# Patient Record
Sex: FEMALE | Race: WHITE | NOT HISPANIC OR LATINO | Employment: FULL TIME | URBAN - METROPOLITAN AREA
[De-identification: names, ages, dates, MRNs, and addresses within clinical notes are randomized per-mention and may not be internally consistent; named-entity substitution may affect disease eponyms.]

---

## 2017-07-14 ENCOUNTER — TRANSCRIBE ORDERS (OUTPATIENT)
Dept: ADMINISTRATIVE | Facility: HOSPITAL | Age: 57
End: 2017-07-14

## 2017-07-14 DIAGNOSIS — R94.39 EQUIVOCAL STRESS TEST: Primary | ICD-10-CM

## 2017-07-21 ENCOUNTER — HOSPITAL ENCOUNTER (OUTPATIENT)
Facility: HOSPITAL | Age: 57
Setting detail: HOSPITAL OUTPATIENT SURGERY
Discharge: HOME | End: 2017-07-21
Attending: INTERNAL MEDICINE | Admitting: INTERNAL MEDICINE

## 2017-07-21 VITALS
WEIGHT: 211.64 LBS | RESPIRATION RATE: 16 BRPM | DIASTOLIC BLOOD PRESSURE: 75 MMHG | HEIGHT: 65 IN | SYSTOLIC BLOOD PRESSURE: 101 MMHG | OXYGEN SATURATION: 93 % | HEART RATE: 66 BPM | TEMPERATURE: 97.3 F | BODY MASS INDEX: 35.26 KG/M2

## 2017-07-21 DIAGNOSIS — R94.39 EQUIVOCAL STRESS TEST: ICD-10-CM

## 2017-07-21 LAB
ANION GAP SERPL CALCULATED.3IONS-SCNC: 12 MMOL/L (ref 3–11)
BUN BLD-MCNC: 17 MG/DL (ref 9–23)
BUN/CREAT SERPL: 18.9 (ref 7–25)
CALCIUM SPEC-SCNC: 9.6 MG/DL (ref 8.7–10.4)
CHLORIDE SERPL-SCNC: 103 MMOL/L (ref 99–109)
CO2 SERPL-SCNC: 23 MMOL/L (ref 20–31)
CREAT BLD-MCNC: 0.9 MG/DL (ref 0.6–1.3)
DEPRECATED RDW RBC AUTO: 43 FL (ref 37–54)
ERYTHROCYTE [DISTWIDTH] IN BLOOD BY AUTOMATED COUNT: 12.9 % (ref 11.3–14.5)
GFR SERPL CREATININE-BSD FRML MDRD: 64 ML/MIN/1.73
GLUCOSE BLD-MCNC: 322 MG/DL (ref 70–100)
GLUCOSE BLDC GLUCOMTR-MCNC: 326 MG/DL (ref 70–130)
GLUCOSE BLDC GLUCOMTR-MCNC: 333 MG/DL (ref 70–130)
HCT VFR BLD AUTO: 44.8 % (ref 34.5–44)
HGB BLD-MCNC: 15.2 G/DL (ref 11.5–15.5)
MCH RBC QN AUTO: 31 PG (ref 27–31)
MCHC RBC AUTO-ENTMCNC: 33.9 G/DL (ref 32–36)
MCV RBC AUTO: 91.4 FL (ref 80–99)
PLATELET # BLD AUTO: 183 10*3/MM3 (ref 150–450)
PMV BLD AUTO: 10.2 FL (ref 6–12)
POTASSIUM BLD-SCNC: 4.1 MMOL/L (ref 3.5–5.5)
RBC # BLD AUTO: 4.9 10*6/MM3 (ref 3.89–5.14)
SODIUM BLD-SCNC: 138 MMOL/L (ref 132–146)
WBC NRBC COR # BLD: 9.63 10*3/MM3 (ref 3.5–10.8)

## 2017-07-21 PROCEDURE — 0 IOPAMIDOL PER 1 ML: Performed by: INTERNAL MEDICINE

## 2017-07-21 PROCEDURE — 25010000002 FENTANYL CITRATE (PF) 100 MCG/2ML SOLUTION: Performed by: INTERNAL MEDICINE

## 2017-07-21 PROCEDURE — C1894 INTRO/SHEATH, NON-LASER: HCPCS | Performed by: INTERNAL MEDICINE

## 2017-07-21 PROCEDURE — 93459 L HRT ART/GRFT ANGIO: CPT | Performed by: INTERNAL MEDICINE

## 2017-07-21 PROCEDURE — 36415 COLL VENOUS BLD VENIPUNCTURE: CPT

## 2017-07-21 PROCEDURE — C1769 GUIDE WIRE: HCPCS | Performed by: INTERNAL MEDICINE

## 2017-07-21 PROCEDURE — 80048 BASIC METABOLIC PNL TOTAL CA: CPT | Performed by: INTERNAL MEDICINE

## 2017-07-21 PROCEDURE — 25010000002 MIDAZOLAM PER 1 MG: Performed by: INTERNAL MEDICINE

## 2017-07-21 PROCEDURE — 85027 COMPLETE CBC AUTOMATED: CPT | Performed by: INTERNAL MEDICINE

## 2017-07-21 PROCEDURE — 82962 GLUCOSE BLOOD TEST: CPT

## 2017-07-21 PROCEDURE — 25010000002 HEPARIN (PORCINE) PER 1000 UNITS: Performed by: INTERNAL MEDICINE

## 2017-07-21 RX ORDER — ALPRAZOLAM 0.25 MG/1
0.25 TABLET ORAL 3 TIMES DAILY PRN
Status: DISCONTINUED | OUTPATIENT
Start: 2017-07-21 | End: 2017-07-21 | Stop reason: HOSPADM

## 2017-07-21 RX ORDER — MIDAZOLAM HYDROCHLORIDE 1 MG/ML
INJECTION INTRAMUSCULAR; INTRAVENOUS AS NEEDED
Status: DISCONTINUED | OUTPATIENT
Start: 2017-07-21 | End: 2017-07-21 | Stop reason: HOSPADM

## 2017-07-21 RX ORDER — HYDROCODONE BITARTRATE AND ACETAMINOPHEN 5; 325 MG/1; MG/1
1 TABLET ORAL EVERY 4 HOURS PRN
Status: DISCONTINUED | OUTPATIENT
Start: 2017-07-21 | End: 2017-07-21 | Stop reason: HOSPADM

## 2017-07-21 RX ORDER — TEMAZEPAM 7.5 MG/1
7.5 CAPSULE ORAL NIGHTLY PRN
Status: DISCONTINUED | OUTPATIENT
Start: 2017-07-21 | End: 2017-07-21 | Stop reason: HOSPADM

## 2017-07-21 RX ORDER — ASPIRIN 325 MG
325 TABLET ORAL DAILY
COMMUNITY

## 2017-07-21 RX ORDER — DEXTROSE MONOHYDRATE 25 G/50ML
25 INJECTION, SOLUTION INTRAVENOUS
Status: DISCONTINUED | OUTPATIENT
Start: 2017-07-21 | End: 2017-07-21 | Stop reason: HOSPADM

## 2017-07-21 RX ORDER — MORPHINE SULFATE 2 MG/ML
1 INJECTION, SOLUTION INTRAMUSCULAR; INTRAVENOUS EVERY 4 HOURS PRN
Status: DISCONTINUED | OUTPATIENT
Start: 2017-07-21 | End: 2017-07-21 | Stop reason: HOSPADM

## 2017-07-21 RX ORDER — ASPIRIN 325 MG
325 TABLET, DELAYED RELEASE (ENTERIC COATED) ORAL DAILY
Status: DISCONTINUED | OUTPATIENT
Start: 2017-07-21 | End: 2017-07-21 | Stop reason: HOSPADM

## 2017-07-21 RX ORDER — NALOXONE HCL 0.4 MG/ML
0.4 VIAL (ML) INJECTION
Status: DISCONTINUED | OUTPATIENT
Start: 2017-07-21 | End: 2017-07-21 | Stop reason: HOSPADM

## 2017-07-21 RX ORDER — NICOTINE POLACRILEX 4 MG
15 LOZENGE BUCCAL
Status: DISCONTINUED | OUTPATIENT
Start: 2017-07-21 | End: 2017-07-21 | Stop reason: HOSPADM

## 2017-07-21 RX ORDER — ACETAMINOPHEN 325 MG/1
650 TABLET ORAL EVERY 4 HOURS PRN
Status: DISCONTINUED | OUTPATIENT
Start: 2017-07-21 | End: 2017-07-21 | Stop reason: HOSPADM

## 2017-07-21 RX ORDER — SODIUM CHLORIDE 9 MG/ML
250 INJECTION, SOLUTION INTRAVENOUS CONTINUOUS
Status: ACTIVE | OUTPATIENT
Start: 2017-07-21 | End: 2017-07-21

## 2017-07-21 RX ORDER — LIDOCAINE HYDROCHLORIDE 10 MG/ML
INJECTION, SOLUTION INFILTRATION; PERINEURAL AS NEEDED
Status: DISCONTINUED | OUTPATIENT
Start: 2017-07-21 | End: 2017-07-21 | Stop reason: HOSPADM

## 2017-07-21 RX ORDER — LISINOPRIL 10 MG/1
10 TABLET ORAL DAILY
Status: ON HOLD | COMMUNITY
End: 2021-09-02

## 2017-07-21 RX ORDER — FENTANYL CITRATE 50 UG/ML
INJECTION, SOLUTION INTRAMUSCULAR; INTRAVENOUS AS NEEDED
Status: DISCONTINUED | OUTPATIENT
Start: 2017-07-21 | End: 2017-07-21 | Stop reason: HOSPADM

## 2017-07-21 RX ORDER — ATORVASTATIN CALCIUM 80 MG/1
80 TABLET, FILM COATED ORAL DAILY
COMMUNITY

## 2017-07-21 RX ORDER — LEVOTHYROXINE SODIUM 112 UG/1
112 TABLET ORAL DAILY
COMMUNITY

## 2018-02-22 ENCOUNTER — CONSULT (OUTPATIENT)
Dept: GASTROENTEROLOGY | Facility: CLINIC | Age: 58
End: 2018-02-22

## 2018-02-22 VITALS
SYSTOLIC BLOOD PRESSURE: 147 MMHG | BODY MASS INDEX: 35.32 KG/M2 | OXYGEN SATURATION: 97 % | DIASTOLIC BLOOD PRESSURE: 93 MMHG | WEIGHT: 212 LBS | HEIGHT: 65 IN | HEART RATE: 72 BPM

## 2018-02-22 DIAGNOSIS — R79.89 ELEVATED LIVER FUNCTION TESTS: Primary | ICD-10-CM

## 2018-02-22 DIAGNOSIS — K76.0 FATTY LIVER: ICD-10-CM

## 2018-02-22 PROCEDURE — 99244 OFF/OP CNSLTJ NEW/EST MOD 40: CPT | Performed by: INTERNAL MEDICINE

## 2018-05-24 ENCOUNTER — LAB (OUTPATIENT)
Dept: LAB | Facility: HOSPITAL | Age: 58
End: 2018-05-24
Attending: INTERNAL MEDICINE

## 2018-05-24 ENCOUNTER — OFFICE VISIT (OUTPATIENT)
Dept: GASTROENTEROLOGY | Facility: CLINIC | Age: 58
End: 2018-05-24

## 2018-05-24 VITALS
WEIGHT: 220 LBS | BODY MASS INDEX: 36.65 KG/M2 | HEIGHT: 65 IN | OXYGEN SATURATION: 97 % | HEART RATE: 90 BPM | SYSTOLIC BLOOD PRESSURE: 138 MMHG | DIASTOLIC BLOOD PRESSURE: 80 MMHG

## 2018-05-24 DIAGNOSIS — K76.0 FATTY LIVER: ICD-10-CM

## 2018-05-24 DIAGNOSIS — R79.89 ELEVATED LIVER FUNCTION TESTS: ICD-10-CM

## 2018-05-24 DIAGNOSIS — R19.4 CHANGE IN BOWEL HABITS: ICD-10-CM

## 2018-05-24 DIAGNOSIS — K76.0 NAFLD (NONALCOHOLIC FATTY LIVER DISEASE): Primary | ICD-10-CM

## 2018-05-24 LAB
ALBUMIN SERPL-MCNC: 4.5 G/DL (ref 3.5–5)
ALP SERPL-CCNC: 123 U/L (ref 35–104)
ALT SERPL W P-5'-P-CCNC: 30 U/L (ref 10–36)
AST SERPL-CCNC: 27 U/L (ref 10–30)
BILIRUB CONJ SERPL-MCNC: 0.1 MG/DL (ref 0–0.2)
BILIRUB INDIRECT SERPL-MCNC: 0.3 MG/DL
BILIRUB SERPL-MCNC: 0.4 MG/DL (ref 0.2–1.8)
FERRITIN SERPL-MCNC: 101 NG/ML (ref 10–290.3)
HAV IGM SERPL QL IA: NORMAL
HBV CORE IGM SERPL QL IA: NORMAL
HBV SURFACE AG SERPL QL IA: NORMAL
HCV AB SER DONR QL: NORMAL
IRON 24H UR-MRATE: 58 MCG/DL (ref 49–151)
IRON SATN MFR SERPL: 16 % (ref 15–50)
PROT SERPL-MCNC: 6.8 G/DL (ref 6–8)
TIBC SERPL-MCNC: 370 MCG/DL (ref 241–421)

## 2018-05-24 PROCEDURE — 80074 ACUTE HEPATITIS PANEL: CPT

## 2018-05-24 PROCEDURE — 82728 ASSAY OF FERRITIN: CPT

## 2018-05-24 PROCEDURE — 83550 IRON BINDING TEST: CPT | Performed by: INTERNAL MEDICINE

## 2018-05-24 PROCEDURE — 99214 OFFICE O/P EST MOD 30 MIN: CPT | Performed by: PHYSICIAN ASSISTANT

## 2018-05-24 PROCEDURE — 80076 HEPATIC FUNCTION PANEL: CPT

## 2018-05-24 PROCEDURE — 83540 ASSAY OF IRON: CPT | Performed by: INTERNAL MEDICINE

## 2018-05-24 RX ORDER — MELATONIN
1000 WEEKLY
COMMUNITY

## 2021-02-25 DIAGNOSIS — Z23 IMMUNIZATION DUE: ICD-10-CM

## 2021-08-27 ENCOUNTER — TRANSCRIBE ORDERS (OUTPATIENT)
Dept: ADMINISTRATIVE | Facility: HOSPITAL | Age: 61
End: 2021-08-27

## 2021-08-27 DIAGNOSIS — R94.39 ABNORMAL STRESS TEST: Primary | ICD-10-CM

## 2021-09-02 ENCOUNTER — HOSPITAL ENCOUNTER (OUTPATIENT)
Facility: HOSPITAL | Age: 61
Setting detail: HOSPITAL OUTPATIENT SURGERY
Discharge: HOME | End: 2021-09-02
Attending: INTERNAL MEDICINE | Admitting: INTERNAL MEDICINE

## 2021-09-02 VITALS
WEIGHT: 210.21 LBS | BODY MASS INDEX: 35.02 KG/M2 | HEIGHT: 65 IN | DIASTOLIC BLOOD PRESSURE: 71 MMHG | RESPIRATION RATE: 16 BRPM | SYSTOLIC BLOOD PRESSURE: 125 MMHG | TEMPERATURE: 97.6 F | OXYGEN SATURATION: 92 % | HEART RATE: 79 BPM

## 2021-09-02 DIAGNOSIS — R94.39 ABNORMAL STRESS TEST: ICD-10-CM

## 2021-09-02 LAB
ANION GAP SERPL CALCULATED.3IONS-SCNC: 14 MMOL/L (ref 5–15)
BUN SERPL-MCNC: 17 MG/DL (ref 8–23)
BUN/CREAT SERPL: 18.7 (ref 7–25)
CALCIUM SPEC-SCNC: 9.7 MG/DL (ref 8.6–10.5)
CHLORIDE SERPL-SCNC: 99 MMOL/L (ref 98–107)
CO2 SERPL-SCNC: 21 MMOL/L (ref 22–29)
CREAT BLDA-MCNC: 0.8 MG/DL (ref 0.6–1.3)
CREAT SERPL-MCNC: 0.91 MG/DL (ref 0.57–1)
DEPRECATED RDW RBC AUTO: 37.9 FL (ref 37–54)
ERYTHROCYTE [DISTWIDTH] IN BLOOD BY AUTOMATED COUNT: 11.9 % (ref 12.3–15.4)
GFR SERPL CREATININE-BSD FRML MDRD: 63 ML/MIN/1.73
GLUCOSE SERPL-MCNC: 403 MG/DL (ref 65–99)
HCT VFR BLD AUTO: 45.7 % (ref 34–46.6)
HGB BLD-MCNC: 15.8 G/DL (ref 12–15.9)
MCH RBC QN AUTO: 30.3 PG (ref 26.6–33)
MCHC RBC AUTO-ENTMCNC: 34.6 G/DL (ref 31.5–35.7)
MCV RBC AUTO: 87.7 FL (ref 79–97)
PLATELET # BLD AUTO: 219 10*3/MM3 (ref 140–450)
PMV BLD AUTO: 11.4 FL (ref 6–12)
POTASSIUM SERPL-SCNC: 4.4 MMOL/L (ref 3.5–5.2)
RBC # BLD AUTO: 5.21 10*6/MM3 (ref 3.77–5.28)
SODIUM SERPL-SCNC: 134 MMOL/L (ref 136–145)
WBC # BLD AUTO: 10.43 10*3/MM3 (ref 3.4–10.8)

## 2021-09-02 PROCEDURE — 93459 L HRT ART/GRFT ANGIO: CPT | Performed by: INTERNAL MEDICINE

## 2021-09-02 PROCEDURE — 82565 ASSAY OF CREATININE: CPT

## 2021-09-02 PROCEDURE — 93567 NJX CAR CTH SPRVLV AORTGRPHY: CPT | Performed by: INTERNAL MEDICINE

## 2021-09-02 PROCEDURE — 0 IOPAMIDOL PER 1 ML: Performed by: INTERNAL MEDICINE

## 2021-09-02 PROCEDURE — 25010000002 FENTANYL CITRATE (PF) 50 MCG/ML SOLUTION: Performed by: INTERNAL MEDICINE

## 2021-09-02 PROCEDURE — 80048 BASIC METABOLIC PNL TOTAL CA: CPT | Performed by: INTERNAL MEDICINE

## 2021-09-02 PROCEDURE — 85027 COMPLETE CBC AUTOMATED: CPT | Performed by: INTERNAL MEDICINE

## 2021-09-02 PROCEDURE — 25010000002 PHENYLEPHRINE 10 MG/ML SOLUTION: Performed by: INTERNAL MEDICINE

## 2021-09-02 PROCEDURE — C1769 GUIDE WIRE: HCPCS | Performed by: INTERNAL MEDICINE

## 2021-09-02 PROCEDURE — 25010000002 MIDAZOLAM PER 1 MG: Performed by: INTERNAL MEDICINE

## 2021-09-02 PROCEDURE — 99152 MOD SED SAME PHYS/QHP 5/>YRS: CPT | Performed by: INTERNAL MEDICINE

## 2021-09-02 PROCEDURE — 99153 MOD SED SAME PHYS/QHP EA: CPT | Performed by: INTERNAL MEDICINE

## 2021-09-02 PROCEDURE — C1894 INTRO/SHEATH, NON-LASER: HCPCS | Performed by: INTERNAL MEDICINE

## 2021-09-02 PROCEDURE — 25010000002 HEPARIN (PORCINE) PER 1000 UNITS: Performed by: INTERNAL MEDICINE

## 2021-09-02 RX ORDER — BEMPEDOIC ACID AND EZETIMIBE 180; 10 MG/1; MG/1
180 TABLET, FILM COATED ORAL
COMMUNITY

## 2021-09-02 RX ORDER — DEXTROSE MONOHYDRATE 25 G/50ML
25 INJECTION, SOLUTION INTRAVENOUS
Status: DISCONTINUED | OUTPATIENT
Start: 2021-09-02 | End: 2021-09-02 | Stop reason: HOSPADM

## 2021-09-02 RX ORDER — ALPRAZOLAM 0.25 MG/1
0.25 TABLET ORAL 3 TIMES DAILY PRN
Status: DISCONTINUED | OUTPATIENT
Start: 2021-09-02 | End: 2021-09-02 | Stop reason: HOSPADM

## 2021-09-02 RX ORDER — SODIUM CHLORIDE 9 MG/ML
INJECTION, SOLUTION INTRAVENOUS CONTINUOUS PRN
Status: COMPLETED | OUTPATIENT
Start: 2021-09-02 | End: 2021-09-02

## 2021-09-02 RX ORDER — LIDOCAINE HYDROCHLORIDE 10 MG/ML
INJECTION, SOLUTION EPIDURAL; INFILTRATION; INTRACAUDAL; PERINEURAL AS NEEDED
Status: DISCONTINUED | OUTPATIENT
Start: 2021-09-02 | End: 2021-09-02 | Stop reason: HOSPADM

## 2021-09-02 RX ORDER — ACETAMINOPHEN 325 MG/1
650 TABLET ORAL EVERY 4 HOURS PRN
Status: DISCONTINUED | OUTPATIENT
Start: 2021-09-02 | End: 2021-09-02 | Stop reason: HOSPADM

## 2021-09-02 RX ORDER — TEMAZEPAM 7.5 MG/1
7.5 CAPSULE ORAL NIGHTLY PRN
Status: DISCONTINUED | OUTPATIENT
Start: 2021-09-02 | End: 2021-09-02 | Stop reason: HOSPADM

## 2021-09-02 RX ORDER — MORPHINE SULFATE 2 MG/ML
1 INJECTION, SOLUTION INTRAMUSCULAR; INTRAVENOUS EVERY 4 HOURS PRN
Status: DISCONTINUED | OUTPATIENT
Start: 2021-09-02 | End: 2021-09-02 | Stop reason: HOSPADM

## 2021-09-02 RX ORDER — NALOXONE HCL 0.4 MG/ML
0.4 VIAL (ML) INJECTION
Status: DISCONTINUED | OUTPATIENT
Start: 2021-09-02 | End: 2021-09-02 | Stop reason: HOSPADM

## 2021-09-02 RX ORDER — MIDAZOLAM HYDROCHLORIDE 1 MG/ML
INJECTION INTRAMUSCULAR; INTRAVENOUS AS NEEDED
Status: DISCONTINUED | OUTPATIENT
Start: 2021-09-02 | End: 2021-09-02 | Stop reason: HOSPADM

## 2021-09-02 RX ORDER — HYDROCODONE BITARTRATE AND ACETAMINOPHEN 5; 325 MG/1; MG/1
1 TABLET ORAL EVERY 4 HOURS PRN
Status: DISCONTINUED | OUTPATIENT
Start: 2021-09-02 | End: 2021-09-02 | Stop reason: HOSPADM

## 2021-09-02 RX ORDER — FENTANYL CITRATE 50 UG/ML
INJECTION, SOLUTION INTRAMUSCULAR; INTRAVENOUS AS NEEDED
Status: DISCONTINUED | OUTPATIENT
Start: 2021-09-02 | End: 2021-09-02 | Stop reason: HOSPADM

## 2021-09-02 RX ORDER — ASPIRIN 325 MG
325 TABLET, DELAYED RELEASE (ENTERIC COATED) ORAL DAILY
Status: DISCONTINUED | OUTPATIENT
Start: 2021-09-02 | End: 2021-09-02 | Stop reason: HOSPADM

## 2021-09-02 RX ORDER — PHENYLEPHRINE HYDROCHLORIDE 10 MG/ML
INJECTION INTRAVENOUS AS NEEDED
Status: DISCONTINUED | OUTPATIENT
Start: 2021-09-02 | End: 2021-09-02 | Stop reason: HOSPADM

## 2021-09-02 RX ORDER — NICOTINE POLACRILEX 4 MG
15 LOZENGE BUCCAL
Status: DISCONTINUED | OUTPATIENT
Start: 2021-09-02 | End: 2021-09-02 | Stop reason: HOSPADM

## 2021-09-02 RX ORDER — SODIUM CHLORIDE 9 MG/ML
250 INJECTION, SOLUTION INTRAVENOUS CONTINUOUS
Status: ACTIVE | OUTPATIENT
Start: 2021-09-02 | End: 2021-09-02

## 2021-09-02 RX ADMIN — ASPIRIN 325 MG: 325 TABLET, COATED ORAL at 12:24

## 2021-10-01 ENCOUNTER — HOSPITAL ENCOUNTER (OUTPATIENT)
Dept: HOSPITAL 79 - US | Age: 61
End: 2021-10-01
Attending: INTERNAL MEDICINE
Payer: COMMERCIAL

## 2021-10-01 DIAGNOSIS — K76.0: Primary | ICD-10-CM

## 2024-10-07 ENCOUNTER — PRIOR AUTHORIZATION (OUTPATIENT)
Dept: ENDOCRINOLOGY | Facility: CLINIC | Age: 64
End: 2024-10-07

## 2024-10-07 ENCOUNTER — OFFICE VISIT (OUTPATIENT)
Dept: ENDOCRINOLOGY | Facility: CLINIC | Age: 64
End: 2024-10-07
Payer: COMMERCIAL

## 2024-10-07 ENCOUNTER — SPECIALTY PHARMACY (OUTPATIENT)
Dept: PHARMACY | Facility: HOSPITAL | Age: 64
End: 2024-10-07
Payer: COMMERCIAL

## 2024-10-07 VITALS — OXYGEN SATURATION: 94 % | SYSTOLIC BLOOD PRESSURE: 146 MMHG | HEART RATE: 62 BPM | DIASTOLIC BLOOD PRESSURE: 82 MMHG

## 2024-10-07 DIAGNOSIS — Z79.4 TYPE 2 DIABETES MELLITUS WITH HYPERGLYCEMIA, WITH LONG-TERM CURRENT USE OF INSULIN: Primary | ICD-10-CM

## 2024-10-07 DIAGNOSIS — E11.65 TYPE 2 DIABETES MELLITUS WITH HYPERGLYCEMIA, WITH LONG-TERM CURRENT USE OF INSULIN: Primary | ICD-10-CM

## 2024-10-07 LAB — GLUCOSE BLDC GLUCOMTR-MCNC: 291 MG/DL (ref 70–130)

## 2024-10-07 PROCEDURE — 99204 OFFICE O/P NEW MOD 45 MIN: CPT | Performed by: NURSE PRACTITIONER

## 2024-10-07 PROCEDURE — 82947 ASSAY GLUCOSE BLOOD QUANT: CPT | Performed by: NURSE PRACTITIONER

## 2024-10-07 RX ORDER — SPIRONOLACTONE 25 MG/1
25 TABLET ORAL DAILY
COMMUNITY

## 2024-10-07 RX ORDER — METOPROLOL TARTRATE 25 MG/1
25 TABLET, FILM COATED ORAL DAILY
COMMUNITY

## 2024-10-07 RX ORDER — ASPIRIN 81 MG/1
81 TABLET ORAL DAILY
COMMUNITY

## 2024-10-07 RX ORDER — LOSARTAN POTASSIUM 100 MG/1
100 TABLET ORAL DAILY
COMMUNITY

## 2024-10-07 RX ORDER — CLOPIDOGREL BISULFATE 75 MG/1
75 TABLET ORAL DAILY
COMMUNITY

## 2024-10-07 RX ORDER — ACYCLOVIR 400 MG/1
1 TABLET ORAL
Qty: 3 EACH | Refills: 5 | Status: SHIPPED | OUTPATIENT
Start: 2024-10-07

## 2024-10-07 RX ORDER — DAPAGLIFLOZIN 10 MG/1
10 TABLET, FILM COATED ORAL DAILY
COMMUNITY

## 2024-10-07 RX ORDER — DILTIAZEM HYDROCHLORIDE 180 MG/1
180 CAPSULE, COATED, EXTENDED RELEASE ORAL DAILY
COMMUNITY

## 2024-10-07 RX ORDER — KETOROLAC TROMETHAMINE 5 MG/ML
1 SOLUTION OPHTHALMIC 4 TIMES DAILY
COMMUNITY
Start: 2024-10-01

## 2024-10-07 RX ORDER — DIFLUPREDNATE OPHTHALMIC 0.5 MG/ML
1 EMULSION OPHTHALMIC 4 TIMES DAILY
COMMUNITY
Start: 2024-10-02

## 2024-10-07 RX ORDER — EZETIMIBE 10 MG/1
10 TABLET ORAL DAILY
COMMUNITY

## 2024-10-07 RX ORDER — MULTIVIT-MIN/IRON/FOLIC ACID/K 18-600-40
CAPSULE ORAL
COMMUNITY

## 2024-10-07 RX ORDER — ACYCLOVIR 400 MG/1
1 TABLET ORAL TAKE AS DIRECTED
Qty: 1 EACH | Refills: 0 | Status: SHIPPED | OUTPATIENT
Start: 2024-10-07

## 2024-10-07 RX ORDER — PANTOPRAZOLE SODIUM 20 MG/1
20 TABLET, DELAYED RELEASE ORAL DAILY
COMMUNITY

## 2024-10-07 RX ORDER — LANOLIN ALCOHOL/MO/W.PET/CERES
1000 CREAM (GRAM) TOPICAL DAILY
COMMUNITY

## 2024-11-05 ENCOUNTER — OFFICE VISIT (OUTPATIENT)
Dept: ENDOCRINOLOGY | Facility: CLINIC | Age: 64
End: 2024-11-05
Payer: COMMERCIAL

## 2024-11-05 VITALS
WEIGHT: 202.8 LBS | BODY MASS INDEX: 33.75 KG/M2 | HEART RATE: 58 BPM | SYSTOLIC BLOOD PRESSURE: 151 MMHG | OXYGEN SATURATION: 93 % | DIASTOLIC BLOOD PRESSURE: 62 MMHG

## 2024-11-05 DIAGNOSIS — E11.65 TYPE 2 DIABETES MELLITUS WITH HYPERGLYCEMIA, WITH LONG-TERM CURRENT USE OF INSULIN: Primary | ICD-10-CM

## 2024-11-05 DIAGNOSIS — Z79.4 TYPE 2 DIABETES MELLITUS WITH HYPERGLYCEMIA, WITH LONG-TERM CURRENT USE OF INSULIN: Primary | ICD-10-CM

## 2024-11-05 LAB
EXPIRATION DATE: ABNORMAL
GLUCOSE BLDC GLUCOMTR-MCNC: 161 MG/DL (ref 70–130)
Lab: ABNORMAL

## 2024-11-05 PROCEDURE — 82570 ASSAY OF URINE CREATININE: CPT | Performed by: NURSE PRACTITIONER

## 2024-11-05 PROCEDURE — 82043 UR ALBUMIN QUANTITATIVE: CPT | Performed by: NURSE PRACTITIONER

## 2024-11-05 RX ORDER — DILTIAZEM HYDROCHLORIDE 180 MG/1
180 CAPSULE, EXTENDED RELEASE ORAL
COMMUNITY
Start: 2024-08-27

## 2024-11-05 NOTE — PROGRESS NOTES
Chief Complaint   Patient presents with    Diabetes     Follow up dm2, last A1C 10/4/24 value 9.8        Referring Provider  No ref. provider found     HPI   Roselia Rossi is a 63 y.o. female had concerns including Diabetes (Follow up dm2, last A1C 10/4/24 value 9.8).    T2DM.    She is having improvements to her blood sugars, but they are still above goal.     Diabetes:  Diabetes was diagnosed late 's/early .  Complications include CABG in  & another in in early s-was dx'd after, has read that Lipitor can cause diabetes and thinks that it caused it, ischemic stroke in 2023 & 2024-noted with elevated BP's at that time, .  Last ophtho exam was 2024-cataract removal with lenses replacement, macular edema-[facility] sees them q 4-6 weeks.  Current medications for diabetes include Farxiga 10 mg QD, Humalog 75/25 25 units BID, Metformin 500 mg BID.  Previous meds: Januvia-has been on in the past, Trulicity-unable to tolerate vomiting daily.  Glucagon: Last use: none  She checks her blood sugar with CGM.   Hypos:none  FSB-150    ACE/ARB:yes, Statin: yes  Labs:  A1C:14.1 (2024), 9.8 (10/4/24)  Lipid Panel:utd-per cardiologist  KERMIT: utd-PCP    The following portions of the patient's history were reviewed and updated as appropriate: allergies, current medications, past family history, past medical history, past social history, past surgical history, and problem list.    Diet: eats a lot of fast food-[employer]  Breakfast: black coffee, egg McMuffin-[employer]  Lunch: fast food  Dinner: fast food  She has seen dietician in the past.    Past Medical History:   Diagnosis Date    Coronary artery disease     Diabetes mellitus     Disease of thyroid gland     Hyperlipidemia     Hypertension      Past Surgical History:   Procedure Laterality Date    CARDIAC CATHETERIZATION N/A 2017    Procedure: Left Heart Cath;  Surgeon: Tommy Haider MD;  Location: [facility] CATH INVASIVE  LOCATION;  Service:     CARDIAC CATHETERIZATION N/A 9/2/2021    Procedure: Left Heart Cath;  Surgeon: Tommy Haider MD;  Location:  [facility] CATH INVASIVE LOCATION;  Service: Cardiovascular;  Laterality: N/A;    CHOLECYSTECTOMY      COLONOSCOPY      CORONARY ARTERY BYPASS GRAFT      TUBAL ABDOMINAL LIGATION        Family History   Problem Relation Age of Onset    Cancer Sister     Diabetes Other     Heart disease Other     Cancer Other     Cancer Cousin       Social History     Socioeconomic History    Marital status: Single   Tobacco Use    Smoking status: Former    Smokeless tobacco: Never   Substance and Sexual Activity    Alcohol use: No    Drug use: No    Sexual activity: Defer      No Known Allergies   Current Outpatient Medications on File Prior to Visit   Medication Sig Dispense Refill    Ascorbic Acid (Vitamin C) 500 MG capsule Take  by mouth.      aspirin 81 MG EC tablet Take 1 tablet by mouth Daily.      atorvastatin (LIPITOR) 80 MG tablet Take 1 tablet by mouth Daily.      Cholecalciferol (VITAMIN D3 PO) Take 50,000 Units by mouth 1 (One) Time Per Week.      clopidogrel (PLAVIX) 75 MG tablet Take 1 tablet by mouth Daily.      Continuous Glucose  (Dexcom G7 ) device Use 1 each Take As Directed. 1 each 0    Continuous Glucose Sensor (Dexcom G7 Sensor) misc Use 1 each Every 10 (Ten) Days. 3 each 5    dapagliflozin Propanediol (Farxiga) 10 MG tablet Take 10 mg by mouth Daily.      difluprednate (DUREZOL) 0.05 % ophthalmic emulsion Administer 1 drop to both eyes 4 (Four) Times a Day.      ezetimibe (Zetia) 10 MG tablet Take 1 tablet by mouth Daily.      ketorolac (ACULAR) 0.5 % ophthalmic solution Administer 1 drop to both eyes 4 (Four) Times a Day.      levothyroxine (SYNTHROID, LEVOTHROID) 150 MCG tablet Take 1 tablet by mouth Daily.      metFORMIN (GLUCOPHAGE) 500 MG tablet Take 1 tablet by mouth 2 (Two) Times a Day With Meals.      metoprolol tartrate (LOPRESSOR) 25 MG tablet Take 1  tablet by mouth Daily.      pantoprazole (PROTONIX) 20 MG EC tablet Take 1 tablet by mouth Daily.      sertraline (ZOLOFT) 50 MG tablet Take 1 tablet by mouth Daily.      spironolactone (ALDACTONE) 25 MG tablet Take 1 tablet by mouth Daily.      Tiadylt  MG 24 hr capsule Take 1 capsule by mouth every night at bedtime.      vitamin B-12 (CYANOCOBALAMIN) 1000 MCG tablet Take 1 tablet by mouth Daily.       No current facility-administered medications on file prior to visit.        Review of Systems   Constitutional:  Positive for fatigue.   Eyes:  Positive for blurred vision and visual disturbance.   Endocrine: Positive for polydipsia and polyuria.   All other systems reviewed and are negative.     /62 (BP Location: Right arm, Patient Position: Sitting, Cuff Size: Adult)   Pulse 58   Wt 92 kg (202 lb 12.8 oz)   LMP  (LMP Unknown)   SpO2 93%   BMI 33.75 kg/m²      Physical Exam  Vitals reviewed.   Constitutional:       Appearance: Normal appearance.   Eyes:      Extraocular Movements: Extraocular movements intact.   Neck:      Thyroid: No thyroid mass, thyromegaly or thyroid tenderness.   Cardiovascular:      Rate and Rhythm: Normal rate and regular rhythm.   Pulmonary:      Effort: Pulmonary effort is normal.      Breath sounds: Normal breath sounds.   Feet:      Right foot:      Skin integrity: Skin integrity normal.      Left foot:      Skin integrity: Skin integrity normal.   Skin:     Findings: No abrasion or wound.   Neurological:      General: No focal deficit present.      Mental Status: She is alert and oriented to person, place, and time.   Psychiatric:         Mood and Affect: Mood normal.         Behavior: Behavior normal.       CMP:  Lab Results   Component Value Date    Glucose 161 (A) 11/05/2024    BUN 17 09/02/2021    BUN/Creatinine Ratio 18.7 09/02/2021    Creatinine 0.91 09/02/2021    Creatinine 0.80 09/02/2021    Creatinine, Urine 97.2 11/05/2024    CO2 21.0 (L) 09/02/2021    Calcium  "9.7 09/02/2021    Albumin 4.50 05/24/2018    AST (SGOT) 27 05/24/2018    ALT (SGPT) 30 05/24/2018     Lipid Panel:  No results found for: \"CHOL\", \"TRIG\", \"HDL\", \"VLDL\", \"LDL\"  HbA1c:  No results found for: \"HGBA1C\"   9.8 (10/4/24)    Glucose:  Lab Results   Component Value Date    POCGLU 161 (A) 11/05/2024     Microalbumin:  Lab Results   Component Value Date    MALBCRERATIO  11/05/2024      Comment:      Unable to calculate     TSH:  No results found for: \"TSH\"    Assessment and Plan    Diagnoses and all orders for this visit:    1. Type 2 diabetes mellitus with hyperglycemia, with long-term current use of insulin (Primary)  Assessment & Plan:  -Diabetes is improving with A1c down to 9.8.  -Discussed dietary and exercise guidelines with patient.  -Discussed the importance of yearly eye exams. Continue following with Retina specialist.  -Discussed the importance of checking BG's regularly.   Continue using CGM.  2 week data download is as follows:  Very High: 36%  High:41%  In Range:23%  Low: 0%  Very Low:0%  Trend shows improving BG's with post meal hypers.  -Continue Farxiga 10 mg QD.   -Continue Humalog 75/25 35 units BID.  -Continue Metformin 500 mg BID.  -S/S hypoglycemia reviewed with Rule of 15's advised.  -Follow-up in 3 months.    Orders:  -     POC Glucose, Blood  -     Microalbumin / Creatinine Urine Ratio - Urine, Clean Catch    Other orders  -     LABS SCANNED  -     insulin lispro protamine-insulin lispro (humaLOG 75-25) (75-25) 100 UNIT/ML suspension injection; Inject 35 Units under the skin into the appropriate area as directed 2 (Two) Times a Day With Meals.  Dispense: 30 mL; Refill: 2           Return in about 3 months (around 2/5/2025) for A1C, Follow-up appointment. The patient was instructed to contact the clinic with any interval questions or concerns.        This document has been electronically signed by KETTY Velazquez  November 7, 2024 16:38 EST   Endocrinology    Please note that " portions of this document were completed with a voice recognition program. Efforts were made to edit the dictations, but occasionally words are mis-transcribed.

## 2024-11-06 LAB
ALBUMIN UR-MCNC: >440 MG/DL
CREAT UR-MCNC: 97.2 MG/DL
MICROALBUMIN/CREAT UR: NORMAL MG/G{CREAT}

## 2024-11-07 NOTE — ASSESSMENT & PLAN NOTE
-Diabetes is improving with A1c down to 9.8.  -Discussed dietary and exercise guidelines with patient.  -Discussed the importance of yearly eye exams. Continue following with Retina specialist.  -Discussed the importance of checking BG's regularly.   Continue using CGM.  2 week data download is as follows:  Very High: 36%  High:41%  In Range:23%  Low: 0%  Very Low:0%  Trend shows improving BG's with post meal hypers.  -Continue Farxiga 10 mg QD.   -Continue Humalog 75/25 35 units BID.  -Continue Metformin 500 mg BID.  -S/S hypoglycemia reviewed with Rule of 15's advised.  -Follow-up in 3 months.

## 2025-01-17 ENCOUNTER — APPOINTMENT (OUTPATIENT)
Dept: MRI IMAGING | Facility: HOSPITAL | Age: 65
End: 2025-01-17
Payer: COMMERCIAL

## 2025-01-17 ENCOUNTER — HOSPITAL ENCOUNTER (INPATIENT)
Facility: HOSPITAL | Age: 65
LOS: 7 days | Discharge: NURSING, REHAB OR LONG-TERM CARE | End: 2025-01-24
Attending: INTERNAL MEDICINE | Admitting: INTERNAL MEDICINE
Payer: COMMERCIAL

## 2025-01-17 DIAGNOSIS — R13.10 DYSPHAGIA, UNSPECIFIED TYPE: ICD-10-CM

## 2025-01-17 DIAGNOSIS — R41.841 COGNITIVE COMMUNICATION DEFICIT: Primary | ICD-10-CM

## 2025-01-17 DIAGNOSIS — I63.9 CRYPTOGENIC STROKE: ICD-10-CM

## 2025-01-17 DIAGNOSIS — I63.9 CEREBROVASCULAR ACCIDENT (CVA), UNSPECIFIED MECHANISM: ICD-10-CM

## 2025-01-17 DIAGNOSIS — R00.2 PALPITATIONS: ICD-10-CM

## 2025-01-17 PROBLEM — R29.90 STROKE-LIKE SYMPTOMS: Status: ACTIVE | Noted: 2025-01-17

## 2025-01-17 PROCEDURE — 70551 MRI BRAIN STEM W/O DYE: CPT

## 2025-01-17 PROCEDURE — 99222 1ST HOSP IP/OBS MODERATE 55: CPT | Performed by: FAMILY MEDICINE

## 2025-01-17 PROCEDURE — 82948 REAGENT STRIP/BLOOD GLUCOSE: CPT

## 2025-01-17 RX ORDER — ONDANSETRON 2 MG/ML
4 INJECTION INTRAMUSCULAR; INTRAVENOUS EVERY 6 HOURS PRN
Status: DISCONTINUED | OUTPATIENT
Start: 2025-01-17 | End: 2025-01-24 | Stop reason: HOSPADM

## 2025-01-17 RX ORDER — AMOXICILLIN 250 MG
2 CAPSULE ORAL 2 TIMES DAILY PRN
Status: DISCONTINUED | OUTPATIENT
Start: 2025-01-17 | End: 2025-01-24 | Stop reason: HOSPADM

## 2025-01-17 RX ORDER — SODIUM CHLORIDE 9 MG/ML
40 INJECTION, SOLUTION INTRAVENOUS AS NEEDED
Status: DISCONTINUED | OUTPATIENT
Start: 2025-01-17 | End: 2025-01-24 | Stop reason: HOSPADM

## 2025-01-17 RX ORDER — SODIUM CHLORIDE 0.9 % (FLUSH) 0.9 %
10 SYRINGE (ML) INJECTION EVERY 12 HOURS SCHEDULED
Status: DISCONTINUED | OUTPATIENT
Start: 2025-01-17 | End: 2025-01-24

## 2025-01-17 RX ORDER — SODIUM CHLORIDE 0.9 % (FLUSH) 0.9 %
10 SYRINGE (ML) INJECTION AS NEEDED
Status: DISCONTINUED | OUTPATIENT
Start: 2025-01-17 | End: 2025-01-24 | Stop reason: HOSPADM

## 2025-01-17 RX ORDER — BISACODYL 5 MG/1
5 TABLET, DELAYED RELEASE ORAL DAILY PRN
Status: DISCONTINUED | OUTPATIENT
Start: 2025-01-17 | End: 2025-01-24 | Stop reason: HOSPADM

## 2025-01-17 RX ORDER — ATORVASTATIN CALCIUM 40 MG/1
80 TABLET, FILM COATED ORAL NIGHTLY
Status: DISCONTINUED | OUTPATIENT
Start: 2025-01-17 | End: 2025-01-24 | Stop reason: HOSPADM

## 2025-01-17 RX ORDER — ASPIRIN 81 MG/1
81 TABLET, CHEWABLE ORAL DAILY
Status: DISCONTINUED | OUTPATIENT
Start: 2025-01-18 | End: 2025-01-24 | Stop reason: HOSPADM

## 2025-01-17 RX ORDER — SODIUM CHLORIDE 0.9 % (FLUSH) 0.9 %
10 SYRINGE (ML) INJECTION AS NEEDED
Status: DISCONTINUED | OUTPATIENT
Start: 2025-01-17 | End: 2025-01-24

## 2025-01-17 RX ORDER — CLOPIDOGREL BISULFATE 75 MG/1
75 TABLET ORAL DAILY
Status: DISCONTINUED | OUTPATIENT
Start: 2025-01-18 | End: 2025-01-18

## 2025-01-17 RX ORDER — SODIUM CHLORIDE 0.9 % (FLUSH) 0.9 %
10 SYRINGE (ML) INJECTION EVERY 12 HOURS SCHEDULED
Status: DISCONTINUED | OUTPATIENT
Start: 2025-01-17 | End: 2025-01-24 | Stop reason: HOSPADM

## 2025-01-17 RX ORDER — BISACODYL 10 MG
10 SUPPOSITORY, RECTAL RECTAL DAILY PRN
Status: DISCONTINUED | OUTPATIENT
Start: 2025-01-17 | End: 2025-01-24 | Stop reason: HOSPADM

## 2025-01-17 RX ORDER — POLYETHYLENE GLYCOL 3350 17 G/17G
17 POWDER, FOR SOLUTION ORAL DAILY PRN
Status: DISCONTINUED | OUTPATIENT
Start: 2025-01-17 | End: 2025-01-24 | Stop reason: HOSPADM

## 2025-01-17 RX ORDER — SODIUM CHLORIDE 9 MG/ML
40 INJECTION, SOLUTION INTRAVENOUS AS NEEDED
Status: DISCONTINUED | OUTPATIENT
Start: 2025-01-17 | End: 2025-01-24

## 2025-01-17 RX ORDER — ASPIRIN 300 MG/1
300 SUPPOSITORY RECTAL DAILY
Status: DISCONTINUED | OUTPATIENT
Start: 2025-01-18 | End: 2025-01-24

## 2025-01-17 NOTE — CONSULTS
"Stroke Consult Note    Patient Name: Roselia Rossi   MRN: [id number]  Age: 64 y.o.  Sex: female  : 1960    Primary Care Physician: Leatha Grigsby MD  Referring Physician: OSTRUMAN MEDEIROS    TIME STROKE TEAM CALLED:  EST     TIME PATIENT SEEN:  EST    Handedness: Left  Race:     Chief Complaint/Reason for Consultation: Right MCA stroke    HPI: Roselia Rossi is a 64-year-old female with a PMH significant for right hemispheric watershed strokes (, residual left hand weakness), uncontrolled HTN, uncontrolled HLD, CAD, CABG, CHF, uncontrolled T2DM, CKD, and thyroid disease who presents to [facility] via EMS transfer from [facility] with complaints of multifocal right hemispheric infarcts.  Patient initially presented to [facility] on 2025 with complaints of left-sided weakness.  She underwent an MRI on  that revealed multifocal right hemispheric areas of restricted diffusion.  She was started on DAPT with aspirin and Plavix as well as high-dose statin.  On 2025, she was noted to be more lethargic and generally weak.  She was unable to undergo repeat MRI and MRA secondary to agitation.  Repeat CT head per OSH MD with evolving infarcts and no hemorrhage. She is being transferred to [facility] for higher level of care.  Upon arrival patient is alert and oriented resting in her bed notable for left side hemiparesis, neglect, left facial droop, mild dysarthria.  Patient tells me that she lives alone with her dog noted around New Year's popeye that she fell backwards then this past  she started to use her cane and walker because she was unable to walk properly due to her left side weakness.  Patient did not seek medical help at that time as she states she \"hates doctors\", no vision deficit, mild gaze preference to the right however able to cross midline, no nystagmus, no reported numbness or tingling, left upper extremity cool and pale, bilateral lower extremity edematous with lesions to " her toes, left lower extremity minimal movement, left upper extremity no movement extremity fall immediately to bed.  Patient is a former smoker 25 years ago, denies any current tobacco, alcohol, illicit drug or marijuana use.  Last Known Normal Date/Time: Last Sunday 1/13/2025 EST     Review of Systems   Constitutional:  Positive for activity change.   HENT:  Positive for trouble swallowing.    Respiratory: Negative.     Cardiovascular: Negative.    Gastrointestinal: Negative.    Genitourinary: Negative.    Musculoskeletal:  Positive for gait problem.   Skin:  Positive for pallor and wound.   Neurological:  Positive for speech difficulty and weakness.   Psychiatric/Behavioral:  Positive for confusion.       Past Medical History:   Diagnosis Date    Coronary artery disease     Diabetes mellitus     Disease of thyroid gland     Hyperlipidemia     Hypertension      Past Surgical History:   Procedure Laterality Date    CARDIAC CATHETERIZATION N/A 7/21/2017    Procedure: Left Heart Cath;  Surgeon: Tommy Haider MD;  Location:  [facility];  Service:     CARDIAC CATHETERIZATION N/A 9/2/2021    Procedure: Left Heart Cath;  Surgeon: Tommy Haider MD;  Location:  [facility];  Service: Cardiovascular;  Laterality: N/A;    CHOLECYSTECTOMY      COLONOSCOPY      CORONARY ARTERY BYPASS GRAFT      TUBAL ABDOMINAL LIGATION       Family History   Problem Relation Age of Onset    Cancer Sister     Diabetes Other     Heart disease Other     Cancer Other     Cancer Cousin      Social History     Socioeconomic History    Marital status: Single   Tobacco Use    Smoking status: Former    Smokeless tobacco: Never   Substance and Sexual Activity    Alcohol use: No    Drug use: No    Sexual activity: Defer     No Known Allergies  Prior to Admission medications    Medication Sig Start Date End Date Taking? Authorizing Provider   Ascorbic Acid (Vitamin C) 500 MG capsule Take  by mouth.     Narcisa Cannon MD   aspirin 81 MG EC tablet Take 1 tablet by mouth Daily.    Narcisa Cannon MD   atorvastatin (LIPITOR) 80 MG tablet Take 1 tablet by mouth Daily.    Narcisa Cannon MD   Cholecalciferol (VITAMIN D3 PO) Take 50,000 Units by mouth 1 (One) Time Per Week.    Narcisa Cannon MD   clopidogrel (PLAVIX) 75 MG tablet Take 1 tablet by mouth Daily.    Narcisa Cannon MD   Continuous Glucose  (Dexcom G7 ) device Use 1 each Take As Directed. 10/7/24   Rosemary De Jesus APRN   Continuous Glucose Sensor (Dexcom G7 Sensor) misc Use 1 each Every 10 (Ten) Days. 10/7/24   Rosemary De Jesus APRN   dapagliflozin Propanediol (Farxiga) 10 MG tablet Take 10 mg by mouth Daily.    Narcisa Cannon MD   difluprednate (DUREZOL) 0.05 % ophthalmic emulsion Administer 1 drop to both eyes 4 (Four) Times a Day. 10/2/24   Narcisa Cannon MD   ezetimibe (Zetia) 10 MG tablet Take 1 tablet by mouth Daily.    Narcisa Cannon MD   insulin lispro protamine-insulin lispro (humaLOG 75-25) (75-25) 100 UNIT/ML suspension injection Inject 35 Units under the skin into the appropriate area as directed 2 (Two) Times a Day With Meals. 11/5/24   Rosemary De Jesus APRN   ketorolac (ACULAR) 0.5 % ophthalmic solution Administer 1 drop to both eyes 4 (Four) Times a Day. 10/1/24   Narcisa Cannon MD   levothyroxine (SYNTHROID, LEVOTHROID) 150 MCG tablet Take 1 tablet by mouth Daily.    Narcisa Cannon MD   metFORMIN (GLUCOPHAGE) 500 MG tablet Take 1 tablet by mouth 2 (Two) Times a Day With Meals.    Narcisa Cannon MD   metoprolol tartrate (LOPRESSOR) 25 MG tablet Take 1 tablet by mouth Daily.    Narcisa Cannon MD   pantoprazole (PROTONIX) 20 MG EC tablet Take 1 tablet by mouth Daily.    Narcisa Cannon MD   sertraline (ZOLOFT) 50 MG tablet Take 1 tablet by mouth Daily.    Narcisa Cannon MD   spironolactone (ALDACTONE) 25 MG tablet Take 1 tablet by  "mouth Daily.    Narcisa Cannon MD   Tiadylt  MG 24 hr capsule Take 1 capsule by mouth every night at bedtime. 8/27/24   Narcisa Cannon MD   vitamin B-12 (CYANOCOBALAMIN) 1000 MCG tablet Take 1 tablet by mouth Daily.    Narcisa Cannon MD       Vitals:    01/17/25 2227   Weight: 96.5 kg (212 lb 11.2 oz)   Height: 165.1 cm (65\")      Neurological Exam  Mental Status  Awake, alert and oriented to person, place and time. Oriented to person, place and time. Oriented to person, place, and time. Recent and remote memory are intact. Mild dysarthria present. Language is fluent with no aphasia. Attention and concentration are normal.    Cranial Nerves  CN II: Right visual acuity: Normal. Left visual acuity: Normal.  CN III, IV, VI: Abnormal extraocular movements: No nystagmus. Normal saccades. Normal smooth pursuit. Left ptosis.  Relative afferent pupillary defect absent.  CN V: Facial sensation is normal.  CN VII:  Right: There is no facial weakness.  Left: There is central facial weakness.  CN VIII: Hard of hearing.  CN IX, X: Palate elevates symmetrically  CN XI:  Left: Sternocleidomastoid strength is weak.  CN XII: Tongue midline without atrophy or fasciculations.    Motor  Decreased muscle bulk throughout. Fasciculations present: Right eye twitching. Decreased muscle tone. Left. Left pronator drift. Left hemiparesis.    Sensory  Light touch is normal in upper and lower extremities.  Left-sided hemispatial neglect:    Reflexes  Right Plantar: downgoing  Left Plantar: equivocal    Coordination    Unable to assess.    Gait   Abnormal gait.  Unable to assess.      Physical Exam  Constitutional:       Appearance: She is obese. She is ill-appearing.   HENT:      Head: Normocephalic and atraumatic.      Mouth/Throat:      Mouth: Mucous membranes are dry.   Eyes:      Extraocular Movements: EOM normalNo nystagmus.   Cardiovascular:      Rate and Rhythm: Normal rate.      Pulses: Normal pulses. "   Pulmonary:      Breath sounds: Wheezing present.   Abdominal:      Tenderness: There is no abdominal tenderness.   Musculoskeletal:         General: Swelling present.      Cervical back: Normal range of motion and neck supple.      Right lower leg: Edema present.      Left lower leg: Edema present.   Skin:     General: Skin is warm and dry.      Capillary Refill: Capillary refill takes less than 2 seconds.      Coloration: Skin is pale.      Findings: Lesion present.      Comments: Call left upper extremity\hand   Neurological:      Mental Status: She is oriented to person, place, and time.      Cranial Nerves: Dysarthria present.      Motor: Weakness present.      Gait: Gait abnormal.   Psychiatric:         Mood and Affect: Mood normal.         Behavior: Behavior normal.         Thought Content: Thought content normal.         Judgment: Judgment normal.         Acute Stroke Data    Thrombolytic Inclusion / Exclusion Criteria    Time: 15:47 EST  Person Administering Scale: KETTY Gregory    Inclusion Criteria  [x]   18 years of age or greater   []   Onset of symptoms < 4.5 hours before beginning treatment (stroke onset = time patient was last seen well or without symptoms).   []   Diagnosis of acute ischemic stroke causing measurable disabling deficit (Complete Hemianopia, Any Aphasia, Visual or Sensory Extinction, Any weakness limiting sustained effort against gravity)   []   Any remaining deficit considered potentially disabling in view of patient and practitioner   Exclusion criteria (Do not proceed with Alteplase if any are checked under exclusion criteria)  [x]   Onset unknown or GREATER than 4.5 hours   []   ICH on CT/MRI   []   CT demonstrates hypodensity representing acute or subacute infarct   []   Significant head trauma or prior stroke in the previous 3 months   []   Symptoms suggestive of subarachnoid hemorrhage   []   History of un-ruptured intracranial aneurysm GREATER than 10 mm   []    Recent intracranial or intraspinal surgery within the last 3 months   []   Arterial puncture at a non-compressible site in the previous 7 days   []   Active internal bleeding   []   Acute bleeding tendency   []   Platelet count LESS than 100,000 for known hematological diseases such as leukemia, thrombocytopenia or chronic cirrhosis   []   Current use of anticoagulant with INR GREATER than 1.7 or PT GREATER than 15 seconds, aPTT GREATER than 40 seconds   []   Heparin received within 48 hours, resulting in abnormally elevated aPTT GREATER than upper limit of normal   []   Current use of direct thrombin inhibitors or direct factor Xa inhibitors in the past 48 hours   []   Elevated blood pressure refractory to treatment (systolic GREATER than 185 mm/Hg or diastolic  GREATER than 110 mm/Hg   []   Suspected infective endocarditis and aortic arch dissection   []   Current use of therapeutic treatment dose of low-molecular-weight heparin (LMWH) within the previous 24 hours   []   Structural GI malignancy or bleed   Relative exclusion for all patients  []   Only minor non-disabling symptoms   []   Pregnancy   []   Seizure at onset with postictal residual neurological impairments   []   Major surgery or previous trauma within past 14 days   []   History of previous spontaneous ICH, intracranial neoplasm, or AV malformation   []   Postpartum (within previous 14 days)   []   Recent GI or urinary tract hemorrhage (within previous 21 days)   []   Recent acute MI (within previous 3 months)   []   History of un-ruptured intracranial aneurysm LESS than 10 mm   []   History of ruptured intracranial aneurysm   []   Blood glucose LESS than 50 mg/dL (2.7 mmol/L)   []   Dural puncture within the last 7 days   []   Known GREATER than 10 cerebral microbleeds   Additional exclusions for patients with symptoms onset between 3 and 4.5 hours.  []   Age > 80.   []   On any anticoagulants regardless of INR  >>> Warfarin (Coumadin), Heparin,  Enoxaparin (Lovenox), fondaparinux (Arixtra), bivalirudin (Angiomax), Argatroban, dabigatran (Pradaxa), rivaroxaban (Xarelto), or apixaban (Eliquis)   []   Severe stroke (NIHSS > 25).   []   History of BOTH diabetes and previous ischemic stroke.   []   The risks and benefits have been discussed with the patient or family related to the administration of IV thrombolytic therapy for stroke symptoms.   []   I have discussed and reviewed the patient's case and imaging with the attending prior to IV thrombolytic therapy.   na Time IV thrombolytic administered       Hospital Meds:  Scheduled-   Infusions- No current facility-administered medications for this encounter.     PRNs-     Functional Status Prior to Current Stroke/Hood Score: 0    NIH Stroke Scale  Time: 15:47 EST  Person Administering Scale: KETTY Gregory    Interval: baseline  1a. Level of Consciousness: 0-->Alert, keenly responsive  1b. LOC Questions: 0-->Answers both questions correctly  1c. LOC Commands: 0-->Performs both tasks correctly  2. Best Gaze: 1-->Partial gaze palsy, gaze is abnormal in one or both eyes, but forced deviation or total gaze paresis is not present  3. Visual: 0-->No visual loss  4. Facial Palsy: 1-->Minor paralysis (flattened nasolabial fold, asymmetry on smiling)  5a. Motor Arm, Left: 3-->No effort against gravity, limb falls  5b. Motor Arm, Right: 0-->No drift, limb holds 90 (or 45) degrees for full 10 secs  6a. Motor Leg, Left: 3-->No effort against gravity, leg falls to bed immediately  6b. Motor Leg, Right: 2-->Some effort against gravity, leg falls to bed by 5 secs, but has some effort against gravity  7. Limb Ataxia: 0-->Absent  8. Sensory: 0-->Normal, no sensory loss  9. Best Language: 0-->No aphasia, normal  10. Dysarthria: 1-->Mild-to-moderate dysarthria, patient slurs at least some words and, at worst, can be understood with some difficulty  11. Extinction and Inattention (formerly Neglect): 1-->Visual,  tactile, auditory, spatial, or personal inattention or extinction to bilateral simultaneous stimulation in one of the sensory modalities    Total (NIH Stroke Scale): 12     Results Reviewed:  I have personally reviewed current lab, radiology, and data.     Images will be uploaded, will review MRI/MRA head and neck    Assessment/Plan:    64-year-old female with a PMH significant for right hemispheric watershed strokes (2023, residual left hand weakness), uncontrolled HTN, uncontrolled HLD, CAD, CABG, CHF, uncontrolled T2DM, CKD, and thyroid disease who presents to [facility] via EMS transfer from [facility] with complaints of multifocal right hemispheric infarcts.  Patient initially presented to OSH on 1/14/2025 with complaints of left-sided weakness.  She underwent an MRI on 1/14/2025 that revealed multifocal right hemispheric areas of restricted diffusion.  She was started on DAPT with aspirin and Plavix as well as high-dose statin.  On 1/16/2025, she was noted to be more lethargic and generally weak.  She was unable to undergo repeat MRI and MRA secondary to agitation.  Repeat CT head per OSH MD with evolving infarcts and no hemorrhage. She is being transferred to [facility] for higher level of care.    Of note, no recent vessel imaging has been obtained.  Per review of last neurology note from 10/16/2024, her previous CTA did not reveal any significant stenosis.     Antiplatelet PTA: Aspirin, Plavix  Anticoagulant PTA: None    #Multiple, scattered right hemispheric AIS on MRI   #History of multiple right hemispheric strokes per review of EMR  #History of uncontrolled HTN, HLD, and T2DM per review of EMR  - Upload OSH imaging  - TIA/ischemic stroke order set without thrombolytic therapy  - Aspirin 81 mg daily,  Plavix 75 mg daily continue for 21 days then monotherapy with aspirin for secondary stroke prevention  -P2 Y12 to evaluate response to Plavix  -Thiamine, folate, B12 level ordered and pending  -Doppler bilateral  lower extremity, left upper extremity to rule out any DVTs  - MRI brain pending. Can give low-dose Ativan for agitation prior to MRI  - MRA head and neck pending to evaluate vessel images  - TTE pending; per review of EMR previous TTE with Dr. Haider with a negative bubble study, moderate left ventricular hypertrophy, and no LAE.   - HTN; allow for autoregulation of BP up to 180 for now tell evaluate vessel images.   - HLD; Atorvastatin 80 mg daily.  LDL goal needs to be less than 70  -T2DM, hemoglobin A1c at OSH no reports provided from outside hospital labs at this time, goal less than 7 for secondary stroke prevention.  Diabetes education as appropriate.  Management per primary team.  - N.p.o. till dysphagia screen is passed, if patient passed bedside swallow cardiac diet  - PT/OT/SLP  -Case management for final discharge planning  -EEG ordered and pending to rule out any seizure activity  -Seizure precaution in place  -CK and lactate ordered and pending  -NIH\neurocheck per protocol  -Neurology stroke will continue to follow-up    Essential hypertension  -Allow gradual normalization of blood pressure for now till evaluation of vessel images.    Hyperlipidemia  -High intensity statin, lipid profile ordered and pending as above.  No labs provided from the outside hospital.    Diabetes type 2 complicated by uncontrolled hyperglycemia  -A1c ordered and pending  -Diabetes education is needed  -Blood glucose monitoring management by medicine team with a goal 1 40-1 80    Limited mobility in the setting of new stroke  Left hemiparesis  -OT\PT evaluation  -Case management for final discharge planning    Obesity  -BMI 35.40  -Complicates all aspects of care  -Counseling on healthy diet exercise and weight loss      - Plan was discussed with [facility] MD, Dr. Young, by errol DINERON the patient, primary team, and nursing staff.  Stroke neurology will continue to follow.  Please call with any questions or concerns.

## 2025-01-17 NOTE — LETTER
EMS Transport Request  For use at [facility], [place], [place], [place], and [place] only   Patient Name: Roselia Rossi : 1960   Weight:96.2 kg (212 lb) [facility]up [facility] BLS/ALS: BLS/ALS: BLS   Insurance: [employer] CR[employer] Auth End Date:    Pre-Cert #: D/C Summary complete:    Destination: Other [facility]   Contact Precautions: None   Equipment (O2, Fluids, etc.): O2, settings 2L   Arrive By Date/Time: 2025 Stretcher/WC: Stretcher   CM Requesting: Dannielle Samuel RN Ext: [phone]   Notes/Medical Necessity: not mobile, weakness, fall risk     ______________________________________________________________________    *Only 2 patient bags OR 1 carry-on size bag are permitted.  Wheelchairs and walkers CANNOT transported with the patient. Acknowledge: Yes

## 2025-01-18 ENCOUNTER — APPOINTMENT (OUTPATIENT)
Dept: GENERAL RADIOLOGY | Facility: HOSPITAL | Age: 65
End: 2025-01-18
Payer: COMMERCIAL

## 2025-01-18 ENCOUNTER — APPOINTMENT (OUTPATIENT)
Dept: CARDIOLOGY | Facility: HOSPITAL | Age: 65
End: 2025-01-18
Payer: COMMERCIAL

## 2025-01-18 LAB
ALBUMIN SERPL-MCNC: 2.1 G/DL (ref 3.5–5.2)
ALBUMIN/GLOB SERPL: 0.7 G/DL
ALP SERPL-CCNC: 101 U/L (ref 39–117)
ALT SERPL W P-5'-P-CCNC: 14 U/L (ref 1–33)
ANION GAP SERPL CALCULATED.3IONS-SCNC: 9 MMOL/L (ref 5–15)
AST SERPL-CCNC: 15 U/L (ref 1–32)
AV MEAN PRESS GRAD SYS DOP V1V2: 3.5 MMHG
AV VMAX SYS DOP: 121.5 CM/SEC
BASOPHILS # BLD AUTO: 0.04 10*3/MM3 (ref 0–0.2)
BASOPHILS NFR BLD AUTO: 0.5 % (ref 0–1.5)
BH CV ECHO MEAS - AO MAX PG: 5.9 MMHG
BH CV ECHO MEAS - AO ROOT DIAM: 3.1 CM
BH CV ECHO MEAS - AO V2 VTI: 26.8 CM
BH CV ECHO MEAS - AVA(I,D): 2.7 CM2
BH CV ECHO MEAS - EDV(CUBED): 97.3 ML
BH CV ECHO MEAS - EDV(MOD-SP2): 86.7 ML
BH CV ECHO MEAS - EDV(MOD-SP4): 83.7 ML
BH CV ECHO MEAS - EF(MOD-SP2): 53.4 %
BH CV ECHO MEAS - EF(MOD-SP4): 55.9 %
BH CV ECHO MEAS - ESV(CUBED): 35.9 ML
BH CV ECHO MEAS - ESV(MOD-SP2): 40.4 ML
BH CV ECHO MEAS - ESV(MOD-SP4): 36.9 ML
BH CV ECHO MEAS - FS: 28.3 %
BH CV ECHO MEAS - IVS/LVPW: 1 CM
BH CV ECHO MEAS - IVSD: 1.2 CM
BH CV ECHO MEAS - LA DIMENSION: 4.5 CM
BH CV ECHO MEAS - LAT PEAK E' VEL: 6.7 CM/SEC
BH CV ECHO MEAS - LV MASS(C)D: 205 GRAMS
BH CV ECHO MEAS - LV MAX PG: 4.1 MMHG
BH CV ECHO MEAS - LV MEAN PG: 2 MMHG
BH CV ECHO MEAS - LV V1 MAX: 100.9 CM/SEC
BH CV ECHO MEAS - LV V1 VTI: 22.7 CM
BH CV ECHO MEAS - LVIDD: 4.6 CM
BH CV ECHO MEAS - LVIDS: 3.3 CM
BH CV ECHO MEAS - LVOT AREA: 3.1 CM2
BH CV ECHO MEAS - LVOT DIAM: 2 CM
BH CV ECHO MEAS - LVPWD: 1.2 CM
BH CV ECHO MEAS - MED PEAK E' VEL: 5.5 CM/SEC
BH CV ECHO MEAS - MV A MAX VEL: 122 CM/SEC
BH CV ECHO MEAS - MV DEC SLOPE: 1124 CM/SEC2
BH CV ECHO MEAS - MV DEC TIME: 0.18 SEC
BH CV ECHO MEAS - MV E MAX VEL: 159 CM/SEC
BH CV ECHO MEAS - MV E/A: 1.3
BH CV ECHO MEAS - MV MAX PG: 11.2 MMHG
BH CV ECHO MEAS - MV MEAN PG: 5.8 MMHG
BH CV ECHO MEAS - MV P1/2T: 45.9 MSEC
BH CV ECHO MEAS - MV V2 VTI: 46.1 CM
BH CV ECHO MEAS - MVA(P1/2T): 4.8 CM2
BH CV ECHO MEAS - MVA(VTI): 1.55 CM2
BH CV ECHO MEAS - PA ACC TIME: 0.07 SEC
BH CV ECHO MEAS - PA V2 MAX: 116.5 CM/SEC
BH CV ECHO MEAS - PI END-D VEL: 146 CM/SEC
BH CV ECHO MEAS - SV(LVOT): 71.4 ML
BH CV ECHO MEAS - SV(MOD-SP2): 46.3 ML
BH CV ECHO MEAS - SV(MOD-SP4): 46.8 ML
BH CV ECHO MEAS - TAPSE (>1.6): 1.96 CM
BH CV ECHO MEASUREMENTS AVERAGE E/E' RATIO: 26.07
BH CV ECHO SHUNT ASSESSMENT PERFORMED (HIDDEN SCRIPTING): 1
BH CV LOWER VASCULAR LEFT COMMON FEMORAL COMPRESS: NORMAL
BH CV LOWER VASCULAR LEFT COMMON FEMORAL PHASIC: NORMAL
BH CV LOWER VASCULAR LEFT COMMON FEMORAL SPONT: NORMAL
BH CV LOWER VASCULAR LEFT DISTAL FEMORAL COMPRESS: NORMAL
BH CV LOWER VASCULAR LEFT DISTAL FEMORAL PHASIC: NORMAL
BH CV LOWER VASCULAR LEFT DISTAL FEMORAL SPONT: NORMAL
BH CV LOWER VASCULAR LEFT GASTRONEMIUS COMPRESS: NORMAL
BH CV LOWER VASCULAR LEFT GREATER SAPH AK COMPRESS: NORMAL
BH CV LOWER VASCULAR LEFT LESSER SAPH COMPRESS: NORMAL
BH CV LOWER VASCULAR LEFT MID FEMORAL COMPRESS: NORMAL
BH CV LOWER VASCULAR LEFT MID FEMORAL PHASIC: NORMAL
BH CV LOWER VASCULAR LEFT MID FEMORAL SPONT: NORMAL
BH CV LOWER VASCULAR LEFT PERONEAL AUGMENT: NORMAL
BH CV LOWER VASCULAR LEFT PERONEAL COMPRESS: NORMAL
BH CV LOWER VASCULAR LEFT POPLITEAL COMPRESS: NORMAL
BH CV LOWER VASCULAR LEFT POPLITEAL PHASIC: NORMAL
BH CV LOWER VASCULAR LEFT POPLITEAL SPONT: NORMAL
BH CV LOWER VASCULAR LEFT POSTERIOR TIBIAL AUGMENT: NORMAL
BH CV LOWER VASCULAR LEFT POSTERIOR TIBIAL COMPRESS: NORMAL
BH CV LOWER VASCULAR LEFT PROFUNDA FEMORAL PHASIC: NORMAL
BH CV LOWER VASCULAR LEFT PROFUNDA FEMORAL SPONT: NORMAL
BH CV LOWER VASCULAR LEFT PROXIMAL FEMORAL COMPRESS: NORMAL
BH CV LOWER VASCULAR LEFT PROXIMAL FEMORAL PHASIC: NORMAL
BH CV LOWER VASCULAR LEFT PROXIMAL FEMORAL SPONT: NORMAL
BH CV LOWER VASCULAR LEFT SAPHENOFEMORAL JUNCTION COMPRESS: NORMAL
BH CV LOWER VASCULAR LEFT SAPHENOFEMORAL JUNCTION PHASIC: NORMAL
BH CV LOWER VASCULAR LEFT SAPHENOFEMORAL JUNCTION SPONT: NORMAL
BH CV LOWER VASCULAR RIGHT COMMON FEMORAL COMPRESS: NORMAL
BH CV LOWER VASCULAR RIGHT COMMON FEMORAL PHASIC: NORMAL
BH CV LOWER VASCULAR RIGHT COMMON FEMORAL SPONT: NORMAL
BH CV LOWER VASCULAR RIGHT DISTAL FEMORAL PHASIC: NORMAL
BH CV LOWER VASCULAR RIGHT DISTAL FEMORAL SPONT: NORMAL
BH CV LOWER VASCULAR RIGHT GASTRONEMIUS COMPRESS: NORMAL
BH CV LOWER VASCULAR RIGHT GREATER SAPH AK COMPRESS: NORMAL
BH CV LOWER VASCULAR RIGHT GREATER SAPH BK COMPRESS: NORMAL
BH CV LOWER VASCULAR RIGHT LESSER SAPH COMPRESS: NORMAL
BH CV LOWER VASCULAR RIGHT MID FEMORAL COMPRESS: NORMAL
BH CV LOWER VASCULAR RIGHT MID FEMORAL PHASIC: NORMAL
BH CV LOWER VASCULAR RIGHT MID FEMORAL SPONT: NORMAL
BH CV LOWER VASCULAR RIGHT PERONEAL AUGMENT: NORMAL
BH CV LOWER VASCULAR RIGHT POPLITEAL COMPRESS: NORMAL
BH CV LOWER VASCULAR RIGHT POPLITEAL PHASIC: NORMAL
BH CV LOWER VASCULAR RIGHT POPLITEAL SPONT: NORMAL
BH CV LOWER VASCULAR RIGHT POSTERIOR TIBIAL AUGMENT: NORMAL
BH CV LOWER VASCULAR RIGHT PROFUNDA FEMORAL PHASIC: NORMAL
BH CV LOWER VASCULAR RIGHT PROFUNDA FEMORAL SPONT: NORMAL
BH CV LOWER VASCULAR RIGHT PROXIMAL FEMORAL COMPRESS: NORMAL
BH CV LOWER VASCULAR RIGHT PROXIMAL FEMORAL PHASIC: NORMAL
BH CV LOWER VASCULAR RIGHT PROXIMAL FEMORAL SPONT: NORMAL
BH CV LOWER VASCULAR RIGHT SAPHENOFEMORAL JUNCTION COMPRESS: NORMAL
BH CV LOWER VASCULAR RIGHT SAPHENOFEMORAL JUNCTION PHASIC: NORMAL
BH CV LOWER VASCULAR RIGHT SAPHENOFEMORAL JUNCTION SPONT: NORMAL
BH CV UPPER VENOUS RIGHT AXILLARY COMPRESS: NORMAL
BH CV UPPER VENOUS RIGHT AXILLARY PHASIC: NORMAL
BH CV UPPER VENOUS RIGHT AXILLARY SPONT: NORMAL
BH CV UPPER VENOUS RIGHT BASILIC UPPER AUGMENT: NORMAL
BH CV UPPER VENOUS RIGHT BASILIC UPPER COMPRESS: NORMAL
BH CV UPPER VENOUS RIGHT BRACHIAL COMPRESS: NORMAL
BH CV UPPER VENOUS RIGHT BRACHIAL PHASIC: NORMAL
BH CV UPPER VENOUS RIGHT BRACHIAL SPONT: NORMAL
BH CV UPPER VENOUS RIGHT CEPHALIC UPPER AUGMENT: NORMAL
BH CV UPPER VENOUS RIGHT CEPHALIC UPPER COMPRESS: NORMAL
BH CV UPPER VENOUS RIGHT INTERNAL JUGULAR COMPRESS: NORMAL
BH CV UPPER VENOUS RIGHT INTERNAL JUGULAR PHASIC: NORMAL
BH CV UPPER VENOUS RIGHT INTERNAL JUGULAR SPONT: NORMAL
BH CV UPPER VENOUS RIGHT RADIAL AUGMENT: NORMAL
BH CV UPPER VENOUS RIGHT RADIAL COMPRESS: NORMAL
BH CV UPPER VENOUS RIGHT SUBCLAVIAN COMPRESS: NORMAL
BH CV UPPER VENOUS RIGHT SUBCLAVIAN PHASIC: NORMAL
BH CV UPPER VENOUS RIGHT SUBCLAVIAN SPONT: NORMAL
BH CV UPPER VENOUS RIGHT ULNAR AUGMENT: NORMAL
BH CV UPPER VENOUS RIGHT ULNAR COMPRESS: NORMAL
BH CV VAS BP LEFT ARM: NORMAL MMHG
BH CV XLRA - RV BASE: 3.7 CM
BH CV XLRA - RV LENGTH: 7 CM
BH CV XLRA - RV MID: 2.7 CM
BH CV XLRA - TDI S': 9.7 CM/SEC
BILIRUB SERPL-MCNC: 0.2 MG/DL (ref 0–1.2)
BUN SERPL-MCNC: 20 MG/DL (ref 8–23)
BUN/CREAT SERPL: 13.6 (ref 7–25)
CALCIUM SPEC-SCNC: 8.9 MG/DL (ref 8.6–10.5)
CHLORIDE SERPL-SCNC: 104 MMOL/L (ref 98–107)
CHOLEST SERPL-MCNC: 133 MG/DL (ref 0–200)
CK SERPL-CCNC: 281 U/L (ref 20–180)
CO2 SERPL-SCNC: 22 MMOL/L (ref 22–29)
CREAT SERPL-MCNC: 1.47 MG/DL (ref 0.57–1)
D-LACTATE SERPL-SCNC: 1 MMOL/L (ref 0.5–2)
DEPRECATED RDW RBC AUTO: 47.8 FL (ref 37–54)
EGFRCR SERPLBLD CKD-EPI 2021: 39.2 ML/MIN/1.73
EOSINOPHIL # BLD AUTO: 0.36 10*3/MM3 (ref 0–0.4)
EOSINOPHIL NFR BLD AUTO: 4.2 % (ref 0.3–6.2)
ERYTHROCYTE [DISTWIDTH] IN BLOOD BY AUTOMATED COUNT: 14.3 % (ref 12.3–15.4)
FOLATE SERPL-MCNC: 6.7 NG/ML (ref 4.78–24.2)
GLOBULIN UR ELPH-MCNC: 3 GM/DL
GLUCOSE BLDC GLUCOMTR-MCNC: 169 MG/DL (ref 70–130)
GLUCOSE BLDC GLUCOMTR-MCNC: 182 MG/DL (ref 70–130)
GLUCOSE BLDC GLUCOMTR-MCNC: 196 MG/DL (ref 70–130)
GLUCOSE BLDC GLUCOMTR-MCNC: 217 MG/DL (ref 70–130)
GLUCOSE BLDC GLUCOMTR-MCNC: 218 MG/DL (ref 70–130)
GLUCOSE SERPL-MCNC: 168 MG/DL (ref 65–99)
HBA1C MFR BLD: 12 % (ref 4.8–5.6)
HCT VFR BLD AUTO: 41.2 % (ref 34–46.6)
HDLC SERPL-MCNC: 34 MG/DL (ref 40–60)
HGB BLD-MCNC: 13.2 G/DL (ref 12–15.9)
IMM GRANULOCYTES # BLD AUTO: 0.03 10*3/MM3 (ref 0–0.05)
IMM GRANULOCYTES NFR BLD AUTO: 0.3 % (ref 0–0.5)
LDLC SERPL CALC-MCNC: 76 MG/DL (ref 0–100)
LDLC/HDLC SERPL: 2.15 {RATIO}
LEFT ATRIUM VOLUME INDEX: 30.7 ML/M2
LV EF 2D ECHO EST: 55 %
LV EF BIPLANE MOD: 54.3 %
LYMPHOCYTES # BLD AUTO: 1.47 10*3/MM3 (ref 0.7–3.1)
LYMPHOCYTES NFR BLD AUTO: 17.1 % (ref 19.6–45.3)
MCH RBC QN AUTO: 29.1 PG (ref 26.6–33)
MCHC RBC AUTO-ENTMCNC: 32 G/DL (ref 31.5–35.7)
MCV RBC AUTO: 90.9 FL (ref 79–97)
MONOCYTES # BLD AUTO: 0.66 10*3/MM3 (ref 0.1–0.9)
MONOCYTES NFR BLD AUTO: 7.7 % (ref 5–12)
NEUTROPHILS NFR BLD AUTO: 6.06 10*3/MM3 (ref 1.7–7)
NEUTROPHILS NFR BLD AUTO: 70.2 % (ref 42.7–76)
NRBC BLD AUTO-RTO: 0 /100 WBC (ref 0–0.2)
PA ADP PRP-ACNC: 199 PRU
PLATELET # BLD AUTO: 207 10*3/MM3 (ref 140–450)
PMV BLD AUTO: 10.9 FL (ref 6–12)
POTASSIUM SERPL-SCNC: 4.5 MMOL/L (ref 3.5–5.2)
PROT SERPL-MCNC: 5.1 G/DL (ref 6–8.5)
RBC # BLD AUTO: 4.53 10*6/MM3 (ref 3.77–5.28)
SODIUM SERPL-SCNC: 135 MMOL/L (ref 136–145)
TRIGL SERPL-MCNC: 129 MG/DL (ref 0–150)
VIT B12 BLD-MCNC: >2000 PG/ML (ref 211–946)
VLDLC SERPL-MCNC: 23 MG/DL (ref 5–40)
WBC NRBC COR # BLD AUTO: 8.62 10*3/MM3 (ref 3.4–10.8)

## 2025-01-18 PROCEDURE — 94664 DEMO&/EVAL PT USE INHALER: CPT

## 2025-01-18 PROCEDURE — 83036 HEMOGLOBIN GLYCOSYLATED A1C: CPT | Performed by: NURSE PRACTITIONER

## 2025-01-18 PROCEDURE — 71045 X-RAY EXAM CHEST 1 VIEW: CPT

## 2025-01-18 PROCEDURE — 82550 ASSAY OF CK (CPK): CPT | Performed by: NURSE PRACTITIONER

## 2025-01-18 PROCEDURE — 93970 EXTREMITY STUDY: CPT | Performed by: INTERNAL MEDICINE

## 2025-01-18 PROCEDURE — 94640 AIRWAY INHALATION TREATMENT: CPT

## 2025-01-18 PROCEDURE — 82948 REAGENT STRIP/BLOOD GLUCOSE: CPT

## 2025-01-18 PROCEDURE — 85576 BLOOD PLATELET AGGREGATION: CPT | Performed by: NURSE PRACTITIONER

## 2025-01-18 PROCEDURE — 80061 LIPID PANEL: CPT | Performed by: NURSE PRACTITIONER

## 2025-01-18 PROCEDURE — 82607 VITAMIN B-12: CPT | Performed by: NURSE PRACTITIONER

## 2025-01-18 PROCEDURE — 99233 SBSQ HOSP IP/OBS HIGH 50: CPT | Performed by: STUDENT IN AN ORGANIZED HEALTH CARE EDUCATION/TRAINING PROGRAM

## 2025-01-18 PROCEDURE — 84425 ASSAY OF VITAMIN B-1: CPT | Performed by: NURSE PRACTITIONER

## 2025-01-18 PROCEDURE — 63710000001 INSULIN LISPRO (HUMAN) PER 5 UNITS: Performed by: FAMILY MEDICINE

## 2025-01-18 PROCEDURE — 92523 SPEECH SOUND LANG COMPREHEN: CPT

## 2025-01-18 PROCEDURE — 25010000002 HEPARIN (PORCINE) PER 1000 UNITS: Performed by: STUDENT IN AN ORGANIZED HEALTH CARE EDUCATION/TRAINING PROGRAM

## 2025-01-18 PROCEDURE — 25010000002 DIAZEPAM PER 5 MG: Performed by: FAMILY MEDICINE

## 2025-01-18 PROCEDURE — 93306 TTE W/DOPPLER COMPLETE: CPT | Performed by: INTERNAL MEDICINE

## 2025-01-18 PROCEDURE — 93970 EXTREMITY STUDY: CPT

## 2025-01-18 PROCEDURE — 82746 ASSAY OF FOLIC ACID SERUM: CPT | Performed by: NURSE PRACTITIONER

## 2025-01-18 PROCEDURE — 93306 TTE W/DOPPLER COMPLETE: CPT

## 2025-01-18 PROCEDURE — 93971 EXTREMITY STUDY: CPT | Performed by: INTERNAL MEDICINE

## 2025-01-18 PROCEDURE — 83605 ASSAY OF LACTIC ACID: CPT | Performed by: NURSE PRACTITIONER

## 2025-01-18 PROCEDURE — 99232 SBSQ HOSP IP/OBS MODERATE 35: CPT | Performed by: INTERNAL MEDICINE

## 2025-01-18 PROCEDURE — 93971 EXTREMITY STUDY: CPT

## 2025-01-18 PROCEDURE — 80053 COMPREHEN METABOLIC PANEL: CPT | Performed by: FAMILY MEDICINE

## 2025-01-18 PROCEDURE — 92610 EVALUATE SWALLOWING FUNCTION: CPT

## 2025-01-18 PROCEDURE — 94799 UNLISTED PULMONARY SVC/PX: CPT

## 2025-01-18 PROCEDURE — 25010000002 FUROSEMIDE PER 20 MG: Performed by: INTERNAL MEDICINE

## 2025-01-18 PROCEDURE — 85025 COMPLETE CBC W/AUTO DIFF WBC: CPT | Performed by: FAMILY MEDICINE

## 2025-01-18 RX ORDER — METOPROLOL SUCCINATE 25 MG/1
25 TABLET, EXTENDED RELEASE ORAL
Status: DISCONTINUED | OUTPATIENT
Start: 2025-01-18 | End: 2025-01-24 | Stop reason: HOSPADM

## 2025-01-18 RX ORDER — FUROSEMIDE 10 MG/ML
40 INJECTION INTRAMUSCULAR; INTRAVENOUS ONCE
Status: COMPLETED | OUTPATIENT
Start: 2025-01-18 | End: 2025-01-18

## 2025-01-18 RX ORDER — DEXTROSE MONOHYDRATE 25 G/50ML
25 INJECTION, SOLUTION INTRAVENOUS
Status: DISCONTINUED | OUTPATIENT
Start: 2025-01-18 | End: 2025-01-24 | Stop reason: HOSPADM

## 2025-01-18 RX ORDER — HEPARIN SODIUM 5000 [USP'U]/ML
5000 INJECTION, SOLUTION INTRAVENOUS; SUBCUTANEOUS EVERY 12 HOURS SCHEDULED
Status: DISCONTINUED | OUTPATIENT
Start: 2025-01-18 | End: 2025-01-24 | Stop reason: HOSPADM

## 2025-01-18 RX ORDER — IPRATROPIUM BROMIDE AND ALBUTEROL SULFATE 2.5; .5 MG/3ML; MG/3ML
3 SOLUTION RESPIRATORY (INHALATION)
Status: DISCONTINUED | OUTPATIENT
Start: 2025-01-18 | End: 2025-01-22

## 2025-01-18 RX ORDER — INSULIN LISPRO 100 [IU]/ML
2-9 INJECTION, SOLUTION INTRAVENOUS; SUBCUTANEOUS
Status: DISCONTINUED | OUTPATIENT
Start: 2025-01-18 | End: 2025-01-24 | Stop reason: HOSPADM

## 2025-01-18 RX ORDER — LABETALOL HYDROCHLORIDE 5 MG/ML
10 INJECTION, SOLUTION INTRAVENOUS ONCE
Status: DISCONTINUED | OUTPATIENT
Start: 2025-01-18 | End: 2025-01-23

## 2025-01-18 RX ORDER — ACETAMINOPHEN 325 MG/1
650 TABLET ORAL EVERY 6 HOURS PRN
Status: DISCONTINUED | OUTPATIENT
Start: 2025-01-18 | End: 2025-01-24 | Stop reason: HOSPADM

## 2025-01-18 RX ORDER — DIAZEPAM 10 MG/2ML
2.5 INJECTION, SOLUTION INTRAMUSCULAR; INTRAVENOUS ONCE
Status: COMPLETED | OUTPATIENT
Start: 2025-01-18 | End: 2025-01-18

## 2025-01-18 RX ORDER — NICOTINE POLACRILEX 4 MG
15 LOZENGE BUCCAL
Status: DISCONTINUED | OUTPATIENT
Start: 2025-01-18 | End: 2025-01-24 | Stop reason: HOSPADM

## 2025-01-18 RX ORDER — IBUPROFEN 600 MG/1
1 TABLET ORAL
Status: DISCONTINUED | OUTPATIENT
Start: 2025-01-18 | End: 2025-01-24 | Stop reason: HOSPADM

## 2025-01-18 RX ADMIN — TICAGRELOR 60 MG: 60 TABLET ORAL at 11:48

## 2025-01-18 RX ADMIN — IPRATROPIUM BROMIDE AND ALBUTEROL SULFATE 3 ML: 2.5; .5 SOLUTION RESPIRATORY (INHALATION) at 13:54

## 2025-01-18 RX ADMIN — ACETAMINOPHEN 650 MG: 325 TABLET ORAL at 11:48

## 2025-01-18 RX ADMIN — ATORVASTATIN CALCIUM 80 MG: 40 TABLET, FILM COATED ORAL at 20:37

## 2025-01-18 RX ADMIN — FUROSEMIDE 40 MG: 10 INJECTION, SOLUTION INTRAMUSCULAR; INTRAVENOUS at 11:48

## 2025-01-18 RX ADMIN — Medication 10 ML: at 20:41

## 2025-01-18 RX ADMIN — ASPIRIN 81 MG CHEWABLE TABLET 81 MG: 81 TABLET CHEWABLE at 11:48

## 2025-01-18 RX ADMIN — TICAGRELOR 60 MG: 60 TABLET ORAL at 20:37

## 2025-01-18 RX ADMIN — SACUBITRIL AND VALSARTAN 1 TABLET: 49; 51 TABLET, FILM COATED ORAL at 20:37

## 2025-01-18 RX ADMIN — METOPROLOL SUCCINATE 25 MG: 25 TABLET, EXTENDED RELEASE ORAL at 17:08

## 2025-01-18 RX ADMIN — INSULIN LISPRO 2 UNITS: 100 INJECTION, SOLUTION INTRAVENOUS; SUBCUTANEOUS at 17:08

## 2025-01-18 RX ADMIN — INSULIN LISPRO 3 UNITS: 100 INJECTION, SOLUTION INTRAVENOUS; SUBCUTANEOUS at 20:39

## 2025-01-18 RX ADMIN — HEPARIN SODIUM 5000 UNITS: 5000 INJECTION INTRAVENOUS; SUBCUTANEOUS at 20:37

## 2025-01-18 RX ADMIN — DIAZEPAM 2.5 MG: 10 INJECTION, SOLUTION INTRAMUSCULAR; INTRAVENOUS at 00:47

## 2025-01-18 RX ADMIN — IPRATROPIUM BROMIDE AND ALBUTEROL SULFATE 3 ML: 2.5; .5 SOLUTION RESPIRATORY (INHALATION) at 16:03

## 2025-01-18 RX ADMIN — IPRATROPIUM BROMIDE AND ALBUTEROL SULFATE 3 ML: 2.5; .5 SOLUTION RESPIRATORY (INHALATION) at 20:13

## 2025-01-18 NOTE — PLAN OF CARE
Goal Outcome Evaluation:  Plan of Care Reviewed With: patient        Progress:  (eval)       Anticipated Discharge Disposition (SLP): inpatient rehabilitation facility    SLP Diagnosis: moderate, communication disorder (01/18/25 1040)  SLP Diagnosis Comments: Pt presents with moderate auditory comprehension deficits at this time. SLP is unsure of pt's baseline communicative function as pt has known multiple prior strokes. Pt has L facial droop but has 100% intelligibility. (01/18/25 1040)  SLP Swallowing Diagnosis: mild, oral dysphagia, R/O pharyngeal dysphagia (01/18/25 1040)

## 2025-01-18 NOTE — THERAPY EVALUATION
Acute Care - Speech Language Pathology   Swallow Initial Evaluation [facility]  Clinical Swallow Evaluation  Cognitive-Communication Evaluation       Patient Name: Roselia Rossi  : 1960  MRN: [id number]  Today's Date: 2025               Admit Date: 2025    Visit Dx:     ICD-10-CM ICD-9-CM   1. Cognitive communication deficit  R41.841 799.52   2. Dysphagia, unspecified type  R13.10 787.20     Patient Active Problem List   Diagnosis    Abnormal stress test    Type 2 diabetes mellitus with hyperglycemia    Stroke-like symptoms     Past Medical History:   Diagnosis Date    Coronary artery disease     Diabetes mellitus     Disease of thyroid gland     Hyperlipidemia     Hypertension      Past Surgical History:   Procedure Laterality Date    CARDIAC CATHETERIZATION N/A 2017    Procedure: Left Heart Cath;  Surgeon: Tommy Haider MD;  Location:  [facility];  Service:     CARDIAC CATHETERIZATION N/A 2021    Procedure: Left Heart Cath;  Surgeon: Tommy Haider MD;  Location:  [facility];  Service: Cardiovascular;  Laterality: N/A;    CHOLECYSTECTOMY      COLONOSCOPY      CORONARY ARTERY BYPASS GRAFT      TUBAL ABDOMINAL LIGATION         SLP Recommendation and Plan  SLP Swallowing Diagnosis: mild, oral dysphagia, R/O pharyngeal dysphagia (25 1040)  SLP Diet Recommendation: soft to chew textures, chopped, thin liquids (25 1040)  Recommended Precautions and Strategies: upright posture during/after eating, general aspiration precautions (25 1040)  SLP Rec. for Method of Medication Administration: meds whole, with thin liquids, as tolerated (25 1040)     Monitor for Signs of Aspiration: notify SLP if any concerns (25 1040)     Swallow Criteria for Skilled Therapeutic Interventions Met: demonstrates skilled criteria (25 1040)  Anticipated Discharge Disposition (SLP): inpatient rehabilitation facility (25  1040)  Rehab Potential/Prognosis, Swallowing: good, to achieve stated therapy goals (01/18/25 1040)  Therapy Frequency (Swallow): PRN, 3 days per week (01/18/25 1040)  Predicted Duration Therapy Intervention (Days): 1 week (01/18/25 1040)  Oral Care Recommendations: Oral Care BID/PRN, Suction toothbrush (01/18/25 1040)                                        Progress:  (eval)      SWALLOW EVALUATION (Last 72 Hours)       SLP Adult Swallow Evaluation       Row Name 01/18/25 1040                   Rehab Evaluation    Document Type evaluation  -MM        Subjective Information no complaints  -MM        Patient Observations alert;cooperative  -MM        Patient/Family/Caregiver Comments/Observations No family present  -MM        Patient Effort good  -MM        Oral Care patient refused intervention  -MM           General Information    Patient Profile Reviewed yes  -MM        Pertinent History Of Current Problem Pt is a 64 year old female who presented to the facility with c/o strokelike symptoms and offset balance. Pt has hx of hypertension, hyperlipidemia, hypothyroidism, diabetes, CVA with left-sided hemiparesis.  -MM        Current Method of Nutrition NPO  -MM        Precautions/Limitations, Vision WFL;for purposes of eval  -MM        Precautions/Limitations, Hearing hearing aid, bilaterally  -MM        Prior Level of Function-Communication unknown  -MM        Prior Level of Function-Swallowing soft to chew;thin liquids  -MM        Plans/Goals Discussed with patient;agreed upon  -MM        Barriers to Rehab none identified  -MM        Patient's Goals for Discharge return to PO diet  -MM           Pain    Pretreatment Pain Rating 0/10 - no pain  -MM        Posttreatment Pain Rating 0/10 - no pain  -MM           Oral Motor Structure and Function    Dentition Assessment missing teeth;other (see comments)  partials present but pt deferred treatment  -MM        Secretion Management WNL/WFL  -MM        Mucosal Quality dry   -MM           Oral Musculature and Cranial Nerve Assessment    Oral Motor General Assessment oral labial or buccal impairment  -MM        Oral Labial or Buccal Impairment, Detail, Cranial Nerve VII (Facial): left labial droop  -MM           General Eating/Swallowing Observations    Respiratory Support Currently in Use nasal cannula  -MM        Eating/Swallowing Skills self-fed;fed by SLP  -MM        Positioning During Eating upright in bed  -MM        Utensils Used spoon;straw  -MM        Consistencies Trialed regular textures;pureed;thin liquids  -MM           Clinical Swallow Eval    Oral Prep Phase impaired  -MM        Oral Transit WFL  -MM        Oral Residue WFL  -MM        Pharyngeal Phase no overt signs/symptoms of pharyngeal impairment  -MM        Esophageal Phase unremarkable  -MM        Clinical Swallow Evaluation Summary Pt presents with mild oral dysphagia and suspected functional pharyngeal deglutition. Pt demonstrated no overt s/sx aspiration throughout length of evaluation. SLP recs soft to chew and thin liquid diet and will continue to follow to ensure diet toleration.  -MM           Oral Prep Concerns    Oral Prep Concerns prolonged mastication;inefficient mastication  -MM        Prolonged Mastication regular consistencies  -MM        Inefficient Mastication regular consistencies  -MM        Oral Prep Concerns, Comment Pt with mildly reduced and prolonged mastication of regular consistency.  -MM           SLP Evaluation Clinical Impression    SLP Swallowing Diagnosis mild;oral dysphagia;R/O pharyngeal dysphagia  -MM        Rehab Potential/Prognosis, Swallowing good, to achieve stated therapy goals  -MM        Swallow Criteria for Skilled Therapeutic Interventions Met demonstrates skilled criteria  -MM           Recommendations    Therapy Frequency (Swallow) PRN;3 days per week  -MM        Predicted Duration Therapy Intervention (Days) 1 week  -MM        SLP Diet Recommendation soft to chew  textures;chopped;thin liquids  -MM        Recommended Precautions and Strategies upright posture during/after eating;general aspiration precautions  -MM        Oral Care Recommendations Oral Care BID/PRN;Suction toothbrush  -MM        SLP Rec. for Method of Medication Administration meds whole;with thin liquids;as tolerated  -MM        Monitor for Signs of Aspiration notify SLP if any concerns  -MM        Anticipated Discharge Disposition (SLP) inpatient rehabilitation facility  -MM                  User Key  (r) = Recorded By, (t) = Taken By, (c) = Cosigned By      Initials Name Effective Dates    MM Mariana Moise MS CCC-SLP 08/30/24 -                     EDUCATION  The patient has been educated in the following areas:   Dysphagia (Swallowing Impairment).        SLP GOALS       Row Name 01/18/25 1040             (LTG) Patient will demonstrate functional swallow for    Diet Texture (Demonstrate functional swallow) regular textures  -MM      Liquid viscosity (Demonstrate functional swallow) thin liquids  -MM      [place] (Demonstrate functional swallow) independently (over 90% accuracy)  -MM      Time Frame (Demonstrate functional swallow) 1 week  -MM      Progress/Outcomes (Demonstrate functional swallow) new goal  -MM         (STG) Patient will tolerate trials of    Consistencies Trialed (Tolerate trials) soft to chew (chopped) textures;thin liquids  -MM      Desired Outcome (Tolerate trials) without signs/symptoms of aspiration;with adequate oral prep/transit/clearance  -MM      [place] (Tolerate trials) independently (over 90% accuracy)  -MM      Time Frame (Tolerate trials) 1 week  -MM      Progress/Outcomes (Tolerate trials) new goal  -MM         (STG) Patient will tolerate therapeutic trials of    Consistencies Trialed (Tolerate therapeutic trials) regular textures  -MM      Desired Outcome (Tolerate therapeutic trials) with adequate oral prep/transit/clearance  -MM      [place] (Tolerate  therapeutic trials) independently (over 90% accuracy)  -MM      Time Frame (Tolerate therapeutic trials) 1 week  -MM      Progress/Outcomes (Tolerate therapeutic trials) new goal  -MM         Patient will demonstrate functional language skills for return to discharge environment     [place] with minimal cues  -MM      Time frame 1 week  -MM      Progress/Outcomes new goal  -MM         SLP Diagnostic Treatment     Patient will participate in further assessment in the following areas cognitive-linguistic;clarification of baseline cognitive communication status  -MM      Time Frame (Diagnostic) 1 week  -MM      Progress/Outcomes (Additional Goal 1, SLP) new goal  -MM         Follow Directions Goal 2 (SLP)    Improve Ability to Follow Directions Goal 1 (SLP) multistep commands;80%;with minimal cues (75-90%)  -MM      Time Frame (Follow Directions Goal 1, SLP) 1 week  -MM      Progress/Outcomes (Follow Directions Goal 1, SLP) new goal  -MM         Comprehend Narrative Discourse Goal 1 (SLP)    Improve Comprehension of Narrative Discourse Goal 1 (SLP) respond appropriately to complex conversational statements;respond appropriately to complex conversational questions;80%;with minimal cues (75-90%)  -MM      Time Frame (Comprehend Narrative Discourse Goal 1, SLP) 1 week  -MM      Progress/Outcomes (Comprehend Narrative Discourse Goal 1, SLP) new goal  -MM         Comprehension at Phrase and Sentence Level Goal 1 (SLP)    Improve Reading Comprehension at Phrase and Sentence Level Goal 1 (SLP) sentence;answer simple written y/n questions;answer complex written y/n questions;follow simple written directions;follow complex written directions;80%;with minimal cues (75-90%)  -MM      Time Frame (Reading Comprehension at Phrase and Sentence Level Goal 1, SLP) 1 week  -MM      Progress/Outcomes (Reading Comprehension at Phrase and Sentence Level Goal 1, SLP) new goal  -MM                User Key  (r) = Recorded By, (t) = Taken  By, (c) = Cosigned By      Initials Name Provider Type    Mariana Paiz, MS CCC-SLP Speech and Language Pathologist                         Time Calculation:    Time Calculation- SLP       Row Name 25 1122             Time Calculation- SLP    SLP Start Time 1040  -MM      SLP Received On 25  -MM         Untimed Charges    88130-PY Eval Speech and Production w/ Language Minutes 24  -MM      45766-[id number] Eval Oral Pharyng Swallow Minutes 25  -MM         Total Minutes    Untimed Charges Total Minutes 49  -MM       Total Minutes 49  -MM                User Key  (r) = Recorded By, (t) = Taken By, (c) = Cosigned By      Initials Name Provider Type    MALAIKA Mariana Moise, MS CCC-SLP Speech and Language Pathologist                    Therapy Charges for Today       Code Description Service Date Service Provider Modifiers Qty    12186239250 HC ST EVAL ORAL PHARYNG SWALLOW 2 2025 Mariana Moise MS CCC-SLP GN 1    [id number] HC ST EVAL SPEECH AND PROD W LANG  2 2025 Mariana Moise MS CCC-SLP GN 1                 Mariana Moise MS CCC-SLP  2025   and Acute Care - Speech Language Pathology Initial Evaluation  [facility]     Patient Name: Roselia Rossi  : 1960  MRN: [id number]  Today's Date: 2025               Admit Date: 2025     Visit Dx:    ICD-10-CM ICD-9-CM   1. Cognitive communication deficit  R41.841 799.52   2. Dysphagia, unspecified type  R13.10 787.20     Patient Active Problem List   Diagnosis    Abnormal stress test    Type 2 diabetes mellitus with hyperglycemia    Stroke-like symptoms     Past Medical History:   Diagnosis Date    Coronary artery disease     Diabetes mellitus     Disease of thyroid gland     Hyperlipidemia     Hypertension      Past Surgical History:   Procedure Laterality Date    CARDIAC CATHETERIZATION N/A 2017    Procedure: Left Heart Cath;  Surgeon: Tommy Haider MD;  Location: [facility] INVASIVE LOCATION;  Service:     CARDIAC  CATHETERIZATION N/A 9/2/2021    Procedure: Left Heart Cath;  Surgeon: Tommy Haider MD;  Location: [facility];  Service: Cardiovascular;  Laterality: N/A;    CHOLECYSTECTOMY      COLONOSCOPY      CORONARY ARTERY BYPASS GRAFT      TUBAL ABDOMINAL LIGATION         SLP Recommendation and Plan  SLP Diagnosis: moderate, communication disorder (01/18/25 1040)  SLP Diagnosis Comments: Pt presents with moderate auditory comprehension deficits at this time. SLP is unsure of pt's baseline communicative function as pt has known multiple prior strokes. Pt has L facial droop but has 100% intelligibility. (01/18/25 1040)     Monitor for Signs of Aspiration: notify SLP if any concerns (01/18/25 1040)  Swallow Criteria for Skilled Therapeutic Interventions Met: demonstrates skilled criteria (01/18/25 1040)  SLC Criteria for Skilled Therapy Interventions Met: yes (01/18/25 1040)  Anticipated Discharge Disposition (SLP): inpatient rehabilitation facility (01/18/25 1040)     Therapy Frequency (Swallow): PRN, 3 days per week (01/18/25 1040)  Therapy Frequency (SLP SLC): 3 days per week (01/18/25 1040)  Predicted Duration Therapy Intervention (Days): 1 week (01/18/25 1040)  Oral Care Recommendations: Oral Care BID/PRN, Suction toothbrush (01/18/25 1040)                          Progress:  (eval) (01/18/25 1121)      SLP EVALUATION (Last 72 Hours)       SLP SLC Evaluation       Row Name 01/18/25 1040                   General Information    Patient's Goals for Discharge return to home  -MM           Comprehension Assessment/Intervention    Comprehension Assessment/Intervention Auditory Comprehension;Reading Comprehension  -MM           Auditory Comprehension Assessment/Intervention    Auditory Comprehension (Communication) moderate impairment  -MM        Answers Questions (Communication) yes/no;WFL  -MM        Able to Follow Commands (Communication) 3-step;moderate impairment  -MM        Narrative Discourse  simple paragraph level;moderate impairment  -MM        Auditory Comprehension Communication, Comment Pt answered simple and complex yes/no questions with 100% accuracy. Pt followed multi-step commands with 30% accuracy and narrative discourse task with 50% accuracy.  -MM           Reading Comprehension Assessment/Intervention    Reading Comprehension (Communication) WFL  -MM        Functional Reading Tasks mild impairment  -MM        Reading Comprehension, Comment Pt demonstrated functional reading but mildly reduced comprehension of reading tasks.  -MM           Expression Assessment/Intervention    Expression Assessment/Intervention verbal expression  -MM           Verbal Expression Assessment/Intervention    Verbal Expression WFL  -MM        Confrontational Naming WFL  -MM        Sentence Formulation WFL  -MM        Conversational Discourse/Fluency WFL  -MM        Verbal Expression, Comment Pt presents with functional verbal expression skills at this time. Graphic expression was not evaluated on this date as pt reports she can't write as she is left handed and cannot move her left side.  -MM           Motor Speech Assessment/Intervention    Motor Speech Function WFL  -MM        Speech intelligibility 100%;in connected speech;with unfamiliar listener  -MM           SLP Evaluation Clinical Impressions    SLP Diagnosis moderate;communication disorder  -MM        SLP Diagnosis Comments Pt presents with moderate auditory comprehension deficits at this time. SLP is unsure of pt's baseline communicative function as pt has known multiple prior strokes. Pt has L facial droop but has 100% intelligibility.  -MM        Rehab Potential/Prognosis good  -MM        SLC Criteria for Skilled Therapy Interventions Met yes  -MM        Functional Impact difficulty communicating in an emergency;difficulty in expressing complex messages  -MM           Recommendations    Therapy Frequency (SLP SLC) 3 days per week  -MM                   User Key  (r) = Recorded By, (t) = Taken By, (c) = Cosigned By      Initials Name Effective Dates    MM Mariana Moise, MS CCC-SLP 08/30/24 -                        EDUCATION  The patient has been educated in the following areas:     Cognitive Impairment Communication Impairment.           SLP GOALS       Row Name 01/18/25 1040             (LTG) Patient will demonstrate functional swallow for    Diet Texture (Demonstrate functional swallow) regular textures  -MM      Liquid viscosity (Demonstrate functional swallow) thin liquids  -MM      [place] (Demonstrate functional swallow) independently (over 90% accuracy)  -MM      Time Frame (Demonstrate functional swallow) 1 week  -MM      Progress/Outcomes (Demonstrate functional swallow) new goal  -MM         (STG) Patient will tolerate trials of    Consistencies Trialed (Tolerate trials) soft to chew (chopped) textures;thin liquids  -MM      Desired Outcome (Tolerate trials) without signs/symptoms of aspiration;with adequate oral prep/transit/clearance  -MM      [place] (Tolerate trials) independently (over 90% accuracy)  -MM      Time Frame (Tolerate trials) 1 week  -MM      Progress/Outcomes (Tolerate trials) new goal  -MM         (STG) Patient will tolerate therapeutic trials of    Consistencies Trialed (Tolerate therapeutic trials) regular textures  -MM      Desired Outcome (Tolerate therapeutic trials) with adequate oral prep/transit/clearance  -MM      [place] (Tolerate therapeutic trials) independently (over 90% accuracy)  -MM      Time Frame (Tolerate therapeutic trials) 1 week  -MM      Progress/Outcomes (Tolerate therapeutic trials) new goal  -MM         Patient will demonstrate functional language skills for return to discharge environment     [place] with minimal cues  -MM      Time frame 1 week  -MM      Progress/Outcomes new goal  -MM         SLP Diagnostic Treatment     Patient will participate in further assessment in the following  areas cognitive-linguistic;clarification of baseline cognitive communication status  -MM      Time Frame (Diagnostic) 1 week  -MM      Progress/Outcomes (Additional Goal 1, SLP) new goal  -MM         Follow Directions Goal 2 (SLP)    Improve Ability to Follow Directions Goal 1 (SLP) multistep commands;80%;with minimal cues (75-90%)  -MM      Time Frame (Follow Directions Goal 1, SLP) 1 week  -MM      Progress/Outcomes (Follow Directions Goal 1, SLP) new goal  -MM         Comprehend Narrative Discourse Goal 1 (SLP)    Improve Comprehension of Narrative Discourse Goal 1 (SLP) respond appropriately to complex conversational statements;respond appropriately to complex conversational questions;80%;with minimal cues (75-90%)  -MM      Time Frame (Comprehend Narrative Discourse Goal 1, SLP) 1 week  -MM      Progress/Outcomes (Comprehend Narrative Discourse Goal 1, SLP) new goal  -MM         Comprehension at Phrase and Sentence Level Goal 1 (SLP)    Improve Reading Comprehension at Phrase and Sentence Level Goal 1 (SLP) sentence;answer simple written y/n questions;answer complex written y/n questions;follow simple written directions;follow complex written directions;80%;with minimal cues (75-90%)  -MM      Time Frame (Reading Comprehension at Phrase and Sentence Level Goal 1, SLP) 1 week  -MM      Progress/Outcomes (Reading Comprehension at Phrase and Sentence Level Goal 1, SLP) new goal  -MM                User Key  (r) = Recorded By, (t) = Taken By, (c) = Cosigned By      Initials Name Provider Type    Mariana Paiz MS CCC-SLP Speech and Language Pathologist                              Time Calculation:      Time Calculation- SLP       Row Name 01/18/25 1122             Time Calculation- SLP    SLP Start Time 1040  -MM      SLP Received On 01/18/25  -MM         Untimed Charges    [id number] Eval Speech and Production w/ Language Minutes 24  -MM      [id number] Eval Oral Pharyng Swallow Minutes 25  -MM         Total  Minutes    Untimed Charges Total Minutes 49  -MM       Total Minutes 49  -MM                User Key  (r) = Recorded By, (t) = Taken By, (c) = Cosigned By      Initials Name Provider Type    Mariana Paiz MS CCC-SLP Speech and Language Pathologist                    Therapy Charges for Today       Code Description Service Date Service Provider Modifiers Qty    08740559912 HC ST EVAL ORAL PHARYNG SWALLOW 2 1/18/2025 Mariana Moise MS CCC-SLP GN 1    [id number] HC ST EVAL SPEECH AND PROD W LANG  2 1/18/2025 Mariana Moise MS CCC-SLP GN 1                       Mariana Moise MS CCC-MICHAEL  1/18/2025

## 2025-01-18 NOTE — PROGRESS NOTES
[facility] Medicine Services  PROGRESS NOTE    Patient Name: Roselia Rossi  : 1960  MRN: [id number]    Date of Admission: 2025  Primary Care Physician: Leatha Grigsby MD    Subjective   Subjective     CC: f/u CVAs    HPI: Patient lying in bed, very sleepy but does wake up. Later on in morning woke up more and was complaining of low back pain.      Objective   Objective     Vital Signs:   Temp:  [97.5 °F (36.4 °C)-97.6 °F (36.4 °C)] 97.6 °F (36.4 °C)  Heart Rate:  [73] 73  Resp:  [18-20] 20  BP: (160-168)/(92-96) 168/96     Physical Exam:  Constitutional: No acute distress, awake, alert  HENT: NCAT, mucous membranes moist,4L o2  Respiratory: Wheezing  Cardiovascular: RRR, no murmurs, rubs, or gallops  Gastrointestinal: Positive bowel sounds, soft, nontender, nondistended  Musculoskeletal: No bilateral ankle edema  Psychiatric: Appropriate affect, cooperative  Neurologic: Oriented x 3, strength symmetric in all extremities, Cranial Nerves grossly intact to confrontation, speech clear  Skin: No rashes     Results Reviewed:  LAB RESULTS:      Lab 256 25  0000 25   WBC 8.62  --   --    HEMOGLOBIN 13.2  --   --    HEMATOCRIT 41.2  --   --    PLATELETS 207  --   --    NEUTROS ABS 6.06  --   --    IMMATURE GRANS (ABS) 0.03  --   --    LYMPHS ABS 1.47  --   --    MONOS ABS 0.66  --   --    EOS ABS 0.36  --   --    MCV 90.9  --   --    LACTATE  --  1.0  --    PROTIME  --   --  10.9   APTT  --   --  23.7         Lab 25  0416 25   SODIUM 135*  --    POTASSIUM 4.5  --    CHLORIDE 104  --    CO2 22.0  --    ANION GAP 9.0  --    BUN 20  --    CREATININE 1.47*  --    EGFR 39.2*  --    GLUCOSE 168*  --    CALCIUM 8.9  --    MAGNESIUM  --  2   HEMOGLOBIN A1C 12.00*  --          Lab 25   TOTAL PROTEIN 5.1*   ALBUMIN 2.1*   GLOBULIN 3.0   ALT (SGPT) 14   AST (SGOT) 15   BILIRUBIN 0.2   ALK PHOS 101         Lab 25    PROTIME 10.9   INR 1.01         Lab 01/18/25  0416   CHOLESTEROL 133   LDL CHOL 76   HDL CHOL 34*   TRIGLYCERIDES 129         Lab 01/18/25  0000   FOLATE 6.70   VITAMIN B 12 >2,000*         Brief Urine Lab Results  (Last result in the past 365 days)        Color   Clarity   Blood   Leuk Est   Nitrite   Protein   CREAT   Urine HCG        11/05/24 1519             97.2                 Microbiology Results Abnormal       None            XR Chest 1 View    Result Date: 1/18/2025  XR CHEST 1 VW Date of Exam: 1/18/2025 10:41 AM EST Indication: Soa Comparison: None available. Findings: Sternotomy wires are noted. Heart shadow appears enlarged. There is dense bilateral interstitial and airspace disease throughout the lungs in a pattern suggesting extensive pulmonary edema. There are probably small pleural effusions as well. No pneumothorax is seen.     Impression: Impression: Dense and extensive bilateral pulmonary disease in a pattern favoring pulmonary edema as etiology. Electronically Signed: Ismael Foote MD  1/18/2025 11:06 AM EST  Workstation ID: [id number]    MRI Brain Without Contrast    Result Date: 1/18/2025  MRI BRAIN WO CONTRAST Date of Exam: 1/18/2025 1:49 AM EST Indication: Stroke, follow up stroke.  Comparison: None available. Technique:  Routine multiplanar/multisequence sequence images of the brain were obtained without contrast administration. Findings: Diffusion restriction is present compatible with acute infarct in the right MCA territory, prominently involving the right posterior temporal lobe with additional scattered frontal and parietal involvement. Evaluation is somewhat degraded by patient motion. There is some early right-sided cerebral edema with overlying sulcal effacement, otherwise without evidence of midline shift. Some areas of cortical intrinsic T1 shortening are present consistent with early laminar necrosis. There is no evidence of significant component of hemorrhage. The ventricles are  normal in size and configuration, accounting for some mild generalized volume loss. No unexpected mass or mass effect is evident elsewhere. The orbits are normal. The paranasal sinuses appear clear.     Impression: Impression: Areas of acute infarct are present within the right MCA territory as above, with some mild early edema including overlying sulcal effacement, otherwise without evidence of midline shift or significant component of hemorrhage. Electronically Signed: Florian Lobo MD  1/18/2025 5:08 AM EST  Workstation ID: [id number]    XR chest AP portable    Result Date: 1/17/2025  PORTABLE CHEST     1/17/2025 10:17 AM   HISTORY: Acute shortness of breath. COMPARISON: January 13, 2025. FINDINGS: The heart is stable in size.  The lung fields demonstrate no significant change in the severe diffuse interstitial and airspace opacities. There are small bilateral pleural effusions. There is no pneumothorax. Status post median sternotomy.    Impression: There has been no significant interval change. Continued follow-up recommended. Images reviewed, interpreted, and dictated by Dr. MARQUES Dodge. Transcribed by Sissy Petty PA-C.         Current medications:  Scheduled Meds:aspirin, 81 mg, Oral, Daily   Or  aspirin, 300 mg, Rectal, Daily  atorvastatin, 80 mg, Oral, Nightly  insulin lispro, 2-9 Units, Subcutaneous, 4x Daily AC & at Bedtime  ipratropium-albuterol, 3 mL, Nebulization, 4x Daily - RT  sodium chloride, 10 mL, Intravenous, Q12H  sodium chloride, 10 mL, Intravenous, Q12H  ticagrelor, 60 mg, Oral, BID      Continuous Infusions:   PRN Meds:.  acetaminophen    senna-docusate sodium **AND** polyethylene glycol **AND** bisacodyl **AND** bisacodyl    Calcium Replacement - Follow Nurse / BPA Driven Protocol    dextrose    dextrose    glucagon (human recombinant)    Magnesium Standard Dose Replacement - Follow Nurse / BPA Driven Protocol    melatonin    ondansetron    Potassium Replacement - Follow  Nurse / BPA Driven Protocol    sodium chloride    sodium chloride    sodium chloride    sodium chloride    Assessment & Plan   Assessment & Plan     Active Hospital Problems    Diagnosis  POA    **Stroke-like symptoms [R29.90]  Yes      Resolved Hospital Problems   No resolved problems to display.        Brief Hospital Course to date:  Roselia Rossi is a 64-year-old female with a PMH significant for right hemispheric watershed strokes (2023, residual left hand weakness), uncontrolled HTN, uncontrolled HLD, CAD, CABG, CHF, uncontrolled T2DM, CKD, and thyroid disease who presents to [facility] via EMS transfer from [facility] with complaints of multifocal right hemispheric infarcts.  Patient initially presented to OSH on 1/14/2025 with complaints of left-sided weakness.  She underwent an MRI on 1/14 that revealed multifocal right hemispheric areas of restricted diffusion.  She was started on DAPT with aspirin and Plavix as well as high-dose statin.  On 1/16/2025, she was noted to be more lethargic and generally weak.  She was unable to undergo repeat MRI and MRA secondary to agitation.  Repeat CT head per OSH MD with evolving infarcts and no hemorrhage.     Acute multifocal CVA  HTN  -Stroke team following, continue DAPT+statin  -Autoregulation bp pending further stroke team recs.  -EEG pending.  -PT/OT/SLP    A/C CHF, presumed HFpEF   Hypoxia  -CXR today with pulmonary edema. Echo pending.  -IV lasix once and assess.     Hyperglycemia w/ DMII  -SSI     Hx of hypothyroidism:   Will continue home medications     Expected Discharge Location and Transportation:   Expected Discharge   Expected discharge date/ time has not been documented.     VTE Prophylaxis:  Mechanical VTE prophylaxis orders are present.         AM-PAC 6 Clicks Score (PT): 6 (01/17/25 1996)    CODE STATUS:   Code Status and Medical Interventions: CPR (Attempt to Resuscitate); Full Support   Ordered at: 01/17/25 0601     Level Of Support Discussed  With:    Patient     Code Status (Patient has no pulse and is not breathing):    CPR (Attempt to Resuscitate)     Medical Interventions (Patient has pulse or is breathing):    Full Support       Deb Chatman II, DO  01/18/25

## 2025-01-18 NOTE — PROGRESS NOTES
Stroke Neurology Progress Note     Subjective     This patient was seen in follow-up for: acute right MCA territory infarct  Present for the encounter were: self, patient     Subjective:  My first encounter with the patient.  No acute events overnight.  Per reports, patient was unable to complete MRA head and neck with mild sedation.  Patient demonstrates disorientation, slurred speech, mixed aphasia, left facial droop, right gaze deviation, and left hemiparesis on exam.  Will attempt to call daughter to update.    Objective      Temp:  [97.5 °F (36.4 °C)-97.6 °F (36.4 °C)] 97.6 °F (36.4 °C)  Heart Rate:  [73] 73  Resp:  [18-20] 20  BP: (160-168)/(92-96) 168/96            Objective    Physical Exam:  General Appearance: Alert  HEENT: anicteric sclera, no scleral injection  Lungs: respirations appear comfortable, no obvious increased work of breathing  Extremities: No cyanosis or fingernail clubbing   Skin: No rashes in exposed skin areas     Neurological Examination:   Mental status: Alert.  Oriented to self only.  No dysarthria.  Slowed speech.  Mixed aphasia -follows intermittent commands, nonfluent speech.  Cranial Nerves: Visual fields intact. Extraocular movements intact with no nystagmus.  Right gaze deviation.  Left facial droop.  Sensory: Normal sensory exam to light touch.  Motor: Normal tone.   Strength:  LUE: 0/5 biceps, triceps,   LLE: 1/5 hip flexion/extension  RUE: 5/5 biceps, triceps,   RLE:  4+/5 hip flexion/extension      Labs:    Lab Results   Component Value Date    HGBA1C 12.00 (H) 01/18/2025      Lab Results   Component Value Date    CHOL 133 01/18/2025    TRIG 129 01/18/2025    HDL 34 (L) 01/18/2025    LDL 76 01/18/2025       Lab Results   Component Value Date    WBC 8.62 01/18/2025    HGB 13.2 01/18/2025    HCT 41.2 01/18/2025    MCV 90.9 01/18/2025     01/18/2025     Lab Results   Component Value Date    GLUCOSE 168 (H) 01/18/2025    BUN 20 01/18/2025    CREATININE 1.47 (H)  01/18/2025    EGFRIFNONA 63 09/02/2021    BCR 13.6 01/18/2025    CO2 22.0 01/18/2025    CALCIUM 8.9 01/18/2025    ALBUMIN 2.1 (L) 01/18/2025    AST 15 01/18/2025    ALT 14 01/18/2025       Results from last 7 days   Lab Units 01/18/25  0416   SODIUM mmol/L 135*   POTASSIUM mmol/L 4.5   CHLORIDE mmol/L 104   CO2 mmol/L 22.0   BUN mg/dL 20   CREATININE mg/dL 1.47*   CALCIUM mg/dL 8.9   BILIRUBIN mg/dL 0.2   ALK PHOS U/L 101   ALT (SGPT) U/L 14   AST (SGOT) U/L 15   GLUCOSE mg/dL 168*         Results Review:      All brain images and reports were personally reviewed and I agree with the interpretations except as noted below.    MRI Brain Without Contrast 1/18/2025  Impression: Areas of acute infarct are present within the right MCA territory as above, with some mild early edema including overlying sulcal effacement, otherwise without evidence of midline shift or significant component of hemorrhage.               Assessment/Plan     Assessment:    Roselia Rossi is a 64-year-old female with a past medical history of right hemispheric watershed strokes (2023, residual left hand weakness), hypertension, hyperlipidemia, CAD, CABG, CHF, DMT2 (hemoglobin A1c 12%), CKD, thyroid disease who presented to [facility] on 1/14/2025 with left hemiparesis.  There, MRI 1/14/2025 revealed multifocal right hemispheric infarcts.  She was started aspirin, clopidogrel, and atorvastatin.  On 1/16/2025 she was noted to be more lethargic and weak.  She has been unable to undergo repeat MRI and MRA even with mild sedation.  She was subsequently transferred to [facility] for higher level of care.    # Acute right hemispheric infarct  -Unable to obtain vessel imaging with mild sedation  -Carotid ultrasound ordered  -Lower extremity ultrasound ordered  -Left upper extremity ultrasound ordered  -Patient was found to be a clopidogrel nonresponder with P2Y12 level 218  -Continue Brilinta 60 mg BID  -Can increase dose to 90 mg  twice daily in clinic but will hold given large burden stroke and bleed risk  -Continue aspirin 81 mg daily  -Continue atorvastatin 80 mg daily LDL 76  -EEG pending  -TTE pending  -Monitor telemetry for atrial fibrillation  -If no atrial fibrillation is found on telemetry, patient will need a cardiac monitor prior to discharge  -PT OT evaluation pending pending  -Neuro-checks per unit protocol while inpatient  -Blood pressure goal: SBP <180  -DVT prophylaxis: SCD, heparin  -Follow-up in stroke clinic     Called patient daughter Rhina to update on plan of care, no answer. VM left for callback.      Patient education: call 911 or present to emergency department with any stroke symptom, including unilateral face, arm, or leg weakness, numbness, or paresthesias, unilateral facial droop, speech deficits, dizziness with nausea, vomiting, nystagmus, and incoordination, visual deficits, or severe onset headache.    Stroke neurology will follow results of above workup.  Please call with questions.     Maria Isabel Young MD  [facility] STROKE NEURO  01/18/25  15:39 EST

## 2025-01-18 NOTE — H&P
[facility]   HISTORY AND PHYSICAL    Patient Name: Roselia Rossi  : 1960  MRN: [id number]  Primary Care Physician:  Leatha Grigsby MD  Date of admission: 2025    Subjective   Subjective     Chief Complaint:     History of Present Illness  This is a 64-year-old female with past medical history of hypertension, hyperlipidemia, hypothyroidism, diabetes, CVA with left-sided hemiparesis  presented to the ED with complaints of strokelike symptoms.  Patient explains that she fell out of her chair earlier today.  Her neighbor came over to help her up.  Her neighbor stated that her balance was off and needed to go to the ED.  She states that she has symptoms similar to those when she had strokes in the past.  She denies any fevers, chills, diaphoresis, headache, blurry vision, chest pain, shortness of breath, nausea, vomiting, constipation or diarrhea.  Stroke team was alerted by ER physician.  She was admitted for further evaluation and management.      Review of Systems   Constitutional:  Negative for activity change, appetite change, chills, fatigue and fever.   HENT:  Negative for drooling, facial swelling, postnasal drip and sore throat.    Respiratory:  Negative for shortness of breath.    Cardiovascular:  Negative for chest pain.   Gastrointestinal:  Negative for abdominal distention, abdominal pain, nausea and vomiting.   Endocrine: Negative for polydipsia and polyphagia.   Genitourinary:  Negative for dysuria and hematuria.   Musculoskeletal:  Negative for gait problem and joint swelling.   Neurological:  Positive for speech difficulty and weakness. Negative for dizziness, light-headedness and numbness.   Psychiatric/Behavioral:  Negative for agitation and behavioral problems.         Personal History     Past Medical History:   Diagnosis Date    Coronary artery disease     Diabetes mellitus     Disease of thyroid gland     Hyperlipidemia     Hypertension        Past Surgical History:    Procedure Laterality Date    CARDIAC CATHETERIZATION N/A 7/21/2017    Procedure: Left Heart Cath;  Surgeon: Tommy Haider MD;  Location:  [facility];  Service:     CARDIAC CATHETERIZATION N/A 9/2/2021    Procedure: Left Heart Cath;  Surgeon: Tommy Haider MD;  Location:  [facility];  Service: Cardiovascular;  Laterality: N/A;    CHOLECYSTECTOMY      COLONOSCOPY      CORONARY ARTERY BYPASS GRAFT      TUBAL ABDOMINAL LIGATION         Family History: family history includes Cancer in her cousin, sister, and another family member; Diabetes in an other family member; Heart disease in an other family member. Otherwise pertinent FHx was reviewed and not pertinent to current issue.    Social History:  reports that she has quit smoking. She has never used smokeless tobacco. She reports that she does not drink alcohol and does not use drugs.    Home Medications:  Cholecalciferol, Dexcom G7 , Dexcom G7 Sensor, Vitamin C, aspirin, atorvastatin, clopidogrel, dapagliflozin Propanediol, difluprednate, dilTIAZem, ezetimibe, insulin lispro protamine-insulin lispro, ketorolac, levothyroxine, metFORMIN, metoprolol tartrate, pantoprazole, sertraline, spironolactone, and vitamin B-12    Allergies:  No Known Allergies    Objective    Objective     Vitals:        Physical Exam  Constitutional:       General: She is not in acute distress.     Appearance: She is not ill-appearing.   HENT:      Head: Normocephalic and atraumatic.      Nose: No rhinorrhea.      Mouth/Throat:      Mouth: Mucous membranes are moist.      Pharynx: Oropharynx is clear.   Eyes:      Extraocular Movements: Extraocular movements intact.      Pupils: Pupils are equal, round, and reactive to light.   Cardiovascular:      Rate and Rhythm: Normal rate and regular rhythm.      Heart sounds: No murmur heard.  Pulmonary:      Effort: Pulmonary effort is normal. No respiratory distress.      Breath sounds: Normal  breath sounds. No wheezing.   Chest:      Chest wall: No tenderness.   Abdominal:      General: Abdomen is flat. Bowel sounds are normal. There is no distension.      Palpations: Abdomen is soft.      Tenderness: There is no abdominal tenderness. There is no guarding.   Musculoskeletal:         General: No swelling or tenderness.      Cervical back: Normal range of motion.   Skin:     General: Skin is warm and dry.   Neurological:      General: No focal deficit present.      Mental Status: She is alert and oriented to person, place, and time.      Comments: Left-sided hemiparesis   Psychiatric:         Mood and Affect: Mood normal.         Result Review    Result Review:  I have personally reviewed the results from the time of this admission to 1/17/2025 23:13 EST and agree with these findings:  []  Laboratory list / accordion  []  Microbiology  []  Radiology  []  EKG/Telemetry   []  Cardiology/Vascular   []  Pathology  []  Old records  []  Other:  Most notable findings include:       Assessment & Plan   Assessment / Plan     Brief Patient Summary:  Roselia Rossi is a 64 y.o. female who presented to the ED with stroke-like symptoms. r    Active Hospital Problems:  Active Hospital Problems    Diagnosis     **Stroke-like symptoms      Plan:   Acute CVA/TIA:  CT head showed Evolving multifocal right cerebral hemisphere acute infarcts in the   right MCA territory. No evidence of hemorrhagic conversion.   Admit to IP/neuro  N.p.o. for now until swallow eval passed  Bedside swallow exam  Frequent neurochecks, fall precautions  Continue with aspirin 325 mg daily  Order MRI brain, pending  Consulted PT/OT for eval and treatment  Consulted neurologist, recommendations welcome  Patient evaluated by teleneuro  Daily CBC and CMP, will continue to monitor  Will continue to monitor vitals    Hx of HTN:   Will continue home medications    Hx of HTN:   Will continue home medications    Hyperglycemia secondary to DMII:  Order  insulin sliding scale  Order hypoglycemia protocol  Order A1c  Hold home medications for now  Consult nutritionist for diabetic education  Daily CMP, will continue to monitor    Hx of hypothyroidism:   Will continue home medications         DVT prophylaxis: scds for now   GI prophylaxis: Zofran, Protonix  CODE STATUS: CPR (full code)    VTE Prophylaxis:  Mechanical VTE prophylaxis orders are present.        CODE STATUS:    Level Of Support Discussed With: Patient  Code Status (Patient has no pulse and is not breathing): CPR (Attempt to Resuscitate)  Medical Interventions (Patient has pulse or is breathing): Full Support    Admission Status:  I believe this patient meets Full code status.    Nishi Hsieh MD

## 2025-01-19 ENCOUNTER — APPOINTMENT (OUTPATIENT)
Dept: CARDIOLOGY | Facility: HOSPITAL | Age: 65
End: 2025-01-19
Payer: COMMERCIAL

## 2025-01-19 LAB
ALBUMIN SERPL-MCNC: 2.3 G/DL (ref 3.5–5.2)
ALBUMIN/GLOB SERPL: 0.7 G/DL
ALP SERPL-CCNC: 113 U/L (ref 39–117)
ALT SERPL W P-5'-P-CCNC: 13 U/L (ref 1–33)
ANION GAP SERPL CALCULATED.3IONS-SCNC: 11 MMOL/L (ref 5–15)
AST SERPL-CCNC: 16 U/L (ref 1–32)
BASOPHILS # BLD AUTO: 0.04 10*3/MM3 (ref 0–0.2)
BASOPHILS NFR BLD AUTO: 0.5 % (ref 0–1.5)
BILIRUB SERPL-MCNC: 0.3 MG/DL (ref 0–1.2)
BUN SERPL-MCNC: 16 MG/DL (ref 8–23)
BUN/CREAT SERPL: 13.2 (ref 7–25)
CALCIUM SPEC-SCNC: 9.2 MG/DL (ref 8.6–10.5)
CHLORIDE SERPL-SCNC: 108 MMOL/L (ref 98–107)
CO2 SERPL-SCNC: 24 MMOL/L (ref 22–29)
CREAT SERPL-MCNC: 1.21 MG/DL (ref 0.57–1)
DEPRECATED RDW RBC AUTO: 46.2 FL (ref 37–54)
EGFRCR SERPLBLD CKD-EPI 2021: 49.5 ML/MIN/1.73
EOSINOPHIL # BLD AUTO: 0.48 10*3/MM3 (ref 0–0.4)
EOSINOPHIL NFR BLD AUTO: 6 % (ref 0.3–6.2)
ERYTHROCYTE [DISTWIDTH] IN BLOOD BY AUTOMATED COUNT: 13.8 % (ref 12.3–15.4)
GLOBULIN UR ELPH-MCNC: 3.2 GM/DL
GLUCOSE BLDC GLUCOMTR-MCNC: 134 MG/DL (ref 70–130)
GLUCOSE BLDC GLUCOMTR-MCNC: 166 MG/DL (ref 70–130)
GLUCOSE BLDC GLUCOMTR-MCNC: 182 MG/DL (ref 70–130)
GLUCOSE BLDC GLUCOMTR-MCNC: 216 MG/DL (ref 70–130)
GLUCOSE SERPL-MCNC: 200 MG/DL (ref 65–99)
HCT VFR BLD AUTO: 47.8 % (ref 34–46.6)
HGB BLD-MCNC: 15.5 G/DL (ref 12–15.9)
IMM GRANULOCYTES # BLD AUTO: 0.06 10*3/MM3 (ref 0–0.05)
IMM GRANULOCYTES NFR BLD AUTO: 0.8 % (ref 0–0.5)
LYMPHOCYTES # BLD AUTO: 1.26 10*3/MM3 (ref 0.7–3.1)
LYMPHOCYTES NFR BLD AUTO: 15.8 % (ref 19.6–45.3)
MCH RBC QN AUTO: 29.2 PG (ref 26.6–33)
MCHC RBC AUTO-ENTMCNC: 32.4 G/DL (ref 31.5–35.7)
MCV RBC AUTO: 90 FL (ref 79–97)
MONOCYTES # BLD AUTO: 0.59 10*3/MM3 (ref 0.1–0.9)
MONOCYTES NFR BLD AUTO: 7.4 % (ref 5–12)
NEUTROPHILS NFR BLD AUTO: 5.53 10*3/MM3 (ref 1.7–7)
NEUTROPHILS NFR BLD AUTO: 69.5 % (ref 42.7–76)
NRBC BLD AUTO-RTO: 0 /100 WBC (ref 0–0.2)
PLATELET # BLD AUTO: 240 10*3/MM3 (ref 140–450)
PMV BLD AUTO: 10.4 FL (ref 6–12)
POTASSIUM SERPL-SCNC: 4.1 MMOL/L (ref 3.5–5.2)
PROT SERPL-MCNC: 5.5 G/DL (ref 6–8.5)
RBC # BLD AUTO: 5.31 10*6/MM3 (ref 3.77–5.28)
SODIUM SERPL-SCNC: 143 MMOL/L (ref 136–145)
WBC NRBC COR # BLD AUTO: 7.96 10*3/MM3 (ref 3.4–10.8)

## 2025-01-19 PROCEDURE — 94799 UNLISTED PULMONARY SVC/PX: CPT

## 2025-01-19 PROCEDURE — 82948 REAGENT STRIP/BLOOD GLUCOSE: CPT

## 2025-01-19 PROCEDURE — 97535 SELF CARE MNGMENT TRAINING: CPT

## 2025-01-19 PROCEDURE — 63710000001 INSULIN LISPRO (HUMAN) PER 5 UNITS: Performed by: FAMILY MEDICINE

## 2025-01-19 PROCEDURE — 97166 OT EVAL MOD COMPLEX 45 MIN: CPT

## 2025-01-19 PROCEDURE — 85025 COMPLETE CBC W/AUTO DIFF WBC: CPT | Performed by: FAMILY MEDICINE

## 2025-01-19 PROCEDURE — 25010000002 HEPARIN (PORCINE) PER 1000 UNITS: Performed by: STUDENT IN AN ORGANIZED HEALTH CARE EDUCATION/TRAINING PROGRAM

## 2025-01-19 PROCEDURE — 97530 THERAPEUTIC ACTIVITIES: CPT

## 2025-01-19 PROCEDURE — 99233 SBSQ HOSP IP/OBS HIGH 50: CPT | Performed by: STUDENT IN AN ORGANIZED HEALTH CARE EDUCATION/TRAINING PROGRAM

## 2025-01-19 PROCEDURE — 94664 DEMO&/EVAL PT USE INHALER: CPT

## 2025-01-19 PROCEDURE — 99232 SBSQ HOSP IP/OBS MODERATE 35: CPT | Performed by: INTERNAL MEDICINE

## 2025-01-19 PROCEDURE — 25010000002 FUROSEMIDE PER 20 MG: Performed by: INTERNAL MEDICINE

## 2025-01-19 PROCEDURE — 80053 COMPREHEN METABOLIC PANEL: CPT | Performed by: FAMILY MEDICINE

## 2025-01-19 PROCEDURE — 97162 PT EVAL MOD COMPLEX 30 MIN: CPT

## 2025-01-19 RX ORDER — FUROSEMIDE 10 MG/ML
40 INJECTION INTRAMUSCULAR; INTRAVENOUS ONCE
Status: COMPLETED | OUTPATIENT
Start: 2025-01-19 | End: 2025-01-19

## 2025-01-19 RX ADMIN — HEPARIN SODIUM 5000 UNITS: 5000 INJECTION INTRAVENOUS; SUBCUTANEOUS at 08:15

## 2025-01-19 RX ADMIN — Medication 10 ML: at 08:16

## 2025-01-19 RX ADMIN — SACUBITRIL AND VALSARTAN 1 TABLET: 49; 51 TABLET, FILM COATED ORAL at 08:16

## 2025-01-19 RX ADMIN — INSULIN LISPRO 4 UNITS: 100 INJECTION, SOLUTION INTRAVENOUS; SUBCUTANEOUS at 17:44

## 2025-01-19 RX ADMIN — INSULIN LISPRO 2 UNITS: 100 INJECTION, SOLUTION INTRAVENOUS; SUBCUTANEOUS at 21:09

## 2025-01-19 RX ADMIN — METOPROLOL SUCCINATE 25 MG: 25 TABLET, EXTENDED RELEASE ORAL at 08:15

## 2025-01-19 RX ADMIN — ACETAMINOPHEN 650 MG: 325 TABLET ORAL at 08:29

## 2025-01-19 RX ADMIN — IPRATROPIUM BROMIDE AND ALBUTEROL SULFATE 3 ML: 2.5; .5 SOLUTION RESPIRATORY (INHALATION) at 07:37

## 2025-01-19 RX ADMIN — ATORVASTATIN CALCIUM 80 MG: 40 TABLET, FILM COATED ORAL at 21:09

## 2025-01-19 RX ADMIN — FUROSEMIDE 40 MG: 10 INJECTION, SOLUTION INTRAMUSCULAR; INTRAVENOUS at 08:15

## 2025-01-19 RX ADMIN — IPRATROPIUM BROMIDE AND ALBUTEROL SULFATE 3 ML: 2.5; .5 SOLUTION RESPIRATORY (INHALATION) at 13:01

## 2025-01-19 RX ADMIN — TICAGRELOR 60 MG: 60 TABLET ORAL at 08:16

## 2025-01-19 RX ADMIN — HEPARIN SODIUM 5000 UNITS: 5000 INJECTION INTRAVENOUS; SUBCUTANEOUS at 21:09

## 2025-01-19 RX ADMIN — TICAGRELOR 60 MG: 60 TABLET ORAL at 21:09

## 2025-01-19 RX ADMIN — SACUBITRIL AND VALSARTAN 1 TABLET: 49; 51 TABLET, FILM COATED ORAL at 21:09

## 2025-01-19 RX ADMIN — ASPIRIN 81 MG CHEWABLE TABLET 81 MG: 81 TABLET CHEWABLE at 08:15

## 2025-01-19 RX ADMIN — INSULIN LISPRO 2 UNITS: 100 INJECTION, SOLUTION INTRAVENOUS; SUBCUTANEOUS at 08:15

## 2025-01-19 NOTE — THERAPY EVALUATION
Patient Name: Roselia Rossi  : 1960    MRN: [id number]                              Today's Date: 2025       Admit Date: 2025    Visit Dx:     ICD-10-CM ICD-9-CM   1. Cognitive communication deficit  R41.841 799.52   2. Dysphagia, unspecified type  R13.10 787.20     Patient Active Problem List   Diagnosis    Abnormal stress test    Type 2 diabetes mellitus with hyperglycemia    Stroke-like symptoms     Past Medical History:   Diagnosis Date    Coronary artery disease     Diabetes mellitus     Disease of thyroid gland     Hyperlipidemia     Hypertension      Past Surgical History:   Procedure Laterality Date    CARDIAC CATHETERIZATION N/A 2017    Procedure: Left Heart Cath;  Surgeon: Tommy Haider MD;  Location:  [facility];  Service:     CARDIAC CATHETERIZATION N/A 2021    Procedure: Left Heart Cath;  Surgeon: Tommy Haider MD;  Location:  [facility];  Service: Cardiovascular;  Laterality: N/A;    CHOLECYSTECTOMY      COLONOSCOPY      CORONARY ARTERY BYPASS GRAFT      TUBAL ABDOMINAL LIGATION        General Information       Row Name 25 1153          Physical Therapy Time and Intention    Document Type evaluation  -KG     Mode of Treatment physical therapy  -KG       Row Name 25 1153          General Information    Patient Profile Reviewed yes  -KG     Prior Level of Function independent:;all household mobility;ADL's  -KG     Existing Precautions/Restrictions fall  -KG     Barriers to Rehab medically complex;previous functional deficit;hearing deficit  -KG       Row Name 25 1153          Living Environment    People in Home alone  -KG       Row Name 25 1153          Home Main Entrance    Number of Stairs, Main Entrance four  -KG       Row Name 25 1153          Stairs Within Home, Primary    Number of Stairs, Within Home, Primary none  -KG       Row Name 25 1153          Cognition    Orientation  Status (Cognition) oriented x 4  -KG       Row Name 01/19/25 1153          Safety Issues/Impairments Affecting Functional Mobility    Safety Issues Affecting Function (Mobility) friction/shear risk;insight into deficits/self-awareness;problem-solving;safety precaution awareness;judgment;safety precautions follow-through/compliance  -KG     Impairments Affecting Function (Mobility) balance;cognition;coordination;endurance/activity tolerance;grasp;motor control;motor planning;strength;sensation/sensory awareness;postural/trunk control  -KG               User Key  (r) = Recorded By, (t) = Taken By, (c) = Cosigned By      Initials Name Provider Type    KG Selvin Luh Physical Therapist                   Mobility       Row Name 01/19/25 1154          Bed Mobility    Bed Mobility rolling left;supine-sit;sit-supine  -KG     Rolling Left [place] (Bed Mobility) minimum assist (75% patient effort)  -KG     Supine-Sit [place] (Bed Mobility) maximum assist (25% patient effort);verbal cues  -KG     Sit-Supine [place] (Bed Mobility) maximum assist (25% patient effort);verbal cues  -KG     Assistive Device (Bed Mobility) bed rails;head of bed elevated;repositioning sheet  -KG       Row Name 01/19/25 1154          Transfers    Comment, (Transfers) STS max-A x2, BUE support, able to maintain some weight on LLE but very weak.  Pt became very nauseas, drop in BP from 174/84 to 146/99.  Pt needed to be laid back down.  Significat left lateral lean in sitting requiring constant cues for centering  -KG       Row Name 01/19/25 1154          Sit-Stand Transfer    Sit-Stand [place] (Transfers) maximum assist (25% patient effort);2 person assist  -KG     Assistive Device (Sit-Stand Transfers) other (see comments)  BUE support  -KG       Row Name 01/19/25 1154          Gait/Stairs (Locomotion)    [place] Level (Gait) unable to assess  -KG               User Key  (r) = Recorded By, (t) = Taken By, (c) = Cosigned  By      Initials Name Provider Type    Luh Casarez Physical Therapist                   Obj/Interventions       Row Name 01/19/25 1159          Strength Comprehensive (MMT)    General Manual Muscle Testing (MMT) Assessment lower extremity strength deficits identified  -KG     Comment, General Manual Muscle Testing (MMT) Assessment RLE 5/5, LLE trace activation  -KG       Row Name 01/19/25 1159          Motor Skills    Therapeutic Exercise other (see comments)  knee FL/ ext requiring assist for LLE  -KG       Row Name 01/19/25 1159          Balance    Static Standing Balance maximum assist;2-person assist  -KG     Position/Device Used, Standing Balance supported  -KG     Balance Interventions standing;sit to stand  -KG               User Key  (r) = Recorded By, (t) = Taken By, (c) = Cosigned By      Initials Name Provider Type    Luh Casarez Physical Therapist                   Goals/Plan       Row Name 01/19/25 1204          Bed Mobility Goal 1 (PT)    Activity/Assistive Device (Bed Mobility Goal 1, PT) sit to supine/supine to sit  -KG     [place] Level/Cues Needed (Bed Mobility Goal 1, PT) moderate assist (50-74% patient effort)  -KG     Time Frame (Bed Mobility Goal 1, PT) short term goal (STG);3 days  -KG     Progress/Outcomes (Bed Mobility Goal 1, PT) continuing progress toward goal  -KG       Row Name 01/19/25 1204          Transfer Goal 1 (PT)    Activity/Assistive Device (Transfer Goal 1, PT) sit-to-stand/stand-to-sit;bed-to-chair/chair-to-bed  -KG     [place] Level/Cues Needed (Transfer Goal 1, PT) moderate assist (50-74% patient effort)  -KG     Time Frame (Transfer Goal 1, PT) long term goal (LTG);5 days  -KG     Progress/Outcome (Transfer Goal 1, PT) continuing progress toward goal  -KG               User Key  (r) = Recorded By, (t) = Taken By, (c) = Cosigned By      Initials Name Provider Type    Luh Casarez Physical Therapist                   Clinical Impression        Row Name 01/19/25 1201          Pain    Pretreatment Pain Rating 0/10 - no pain  -KG     Posttreatment Pain Rating 0/10 - no pain  -KG       Row Name 01/19/25 1201          Plan of Care Review    Plan of Care Reviewed With patient  -KG     Progress no change  -KG     Outcome Evaluation PT eval performed: Pt presenting below baseline with significant L side weakness, flaccid LUE.    STS max-A x2, BUE support, able to maintain some weight on LLE but very weak. Pt became very nauseas, drop in BP from 174/84 to 146/99. Pt needed to be laid back down. Significat left lateral lean in sitting requiring constant cues for centering.  Recommend IPR at d/c  -KG       Row Name 01/19/25 1201          Therapy Assessment/Plan (PT)    Patient/Family Therapy Goals Statement (PT) to regain strength  -KG     Rehab Potential (PT) good  -KG     Criteria for Skilled Interventions Met (PT) yes;meets criteria;skilled treatment is necessary  -KG     Therapy Frequency (PT) daily  -KG     Predicted Duration of Therapy Intervention (PT) 6 days  -KG       Row Name 01/19/25 1201          Vital Signs    Pre Systolic BP Rehab 174  -KG     Pre Treatment Diastolic BP 84  -KG     Intra Systolic BP Rehab 146  -KG     Intra Treatment Diastolic BP 99  -KG     Posttreatment Heart Rate (beats/min) 66  -KG     Pre SpO2 (%) 94  -KG     O2 Delivery Pre Treatment nasal cannula  -KG     O2 Delivery Intra Treatment nasal cannula  -KG     Post SpO2 (%) 95  -KG     O2 Delivery Post Treatment nasal cannula  -KG       Row Name 01/19/25 1201          Positioning and Restraints    Pre-Treatment Position in bed  -KG     Post Treatment Position bed  -KG     In Bed notified nsg;fowlers;call light within reach;encouraged to call for assist;exit alarm on  -KG               User Key  (r) = Recorded By, (t) = Taken By, (c) = Cosigned By      Initials Name Provider Type    KG Luh Montalvo Physical Therapist                   Outcome Measures       Row Name 01/19/25  1205          How much help from another person do you currently need...    Turning from your back to your side while in flat bed without using bedrails? 2  -KG     Moving from lying on back to sitting on the side of a flat bed without bedrails? 2  -KG     Moving to and from a bed to a chair (including a wheelchair)? 2  -KG     Standing up from a chair using your arms (e.g., wheelchair, bedside chair)? 2  -KG     Climbing 3-5 steps with a railing? 1  -KG     To walk in hospital room? 1  -KG     AM-PAC 6 Clicks Score (PT) 10  -KG     Highest Level of Mobility Goal 4 --> Transfer to chair/commode  -       Row Name 01/19/25 1205 01/19/25 1052       Modified [place] Scale    Pre-Stroke Modified [place] Scale 2 - Slight disability.  Unable to carry out all previous activities but able to look after own affairs without assistance.  -KG 2 - Slight disability.  Unable to carry out all previous activities but able to look after own affairs without assistance.  -    Modified [place] Scale 4 - Moderately severe disability.  Unable to walk without assistance, and unable to attend to own bodily needs without assistance.  -KG 4 - Moderately severe disability.  Unable to walk without assistance, and unable to attend to own bodily needs without assistance.  -      Row Name 01/19/25 1205 01/19/25 1052       Functional Assessment    Outcome Measure Options AM-PAC 6 Clicks Basic Mobility (PT);Modified [place]  -KG Modified [place]  -      Row Name 01/19/25 1050          Functional Assessment    Outcome Measure Options AM-PAC 6 Clicks Daily Activity (OT)  -               User Key  (r) = Recorded By, (t) = Taken By, (c) = Cosigned By      Initials Name Provider Type    Luh Casarez Physical Therapist    Betsy Anderson OT Occupational Therapist                                 Physical Therapy Education       Title: PT OT SLP Therapies (In Progress)       Topic: Physical Therapy (In Progress)       Point: Mobility training  (In Progress)       Learning Progress Summary            Patient Acceptance, E, NR by KG at 1/19/2025 1208                      Point: Home exercise program (In Progress)       Learning Progress Summary            Patient Acceptance, E, NR by KG at 1/19/2025 1208                      Point: Body mechanics (In Progress)       Learning Progress Summary            Patient Acceptance, E, NR by KG at 1/19/2025 1208                      Point: Precautions (In Progress)       Learning Progress Summary            Patient Acceptance, E, NR by KG at 1/19/2025 1208                                      User Key       Initials Effective Dates Name Provider Type Discipline    KG 12/13/24 -  Luh Montalvo Physical Therapist PT                  PT Recommendation and Plan     Progress: no change  Outcome Evaluation: PT eval performed: Pt presenting below baseline with significant L side weakness, flaccid LUE.    STS max-A x2, BUE support, able to maintain some weight on LLE but very weak. Pt became very nauseas, drop in BP from 174/84 to 146/99. Pt needed to be laid back down. Significat left lateral lean in sitting requiring constant cues for centering.  Recommend IPR at d/c     Time Calculation:         PT Charges       Row Name 01/19/25 1209             Time Calculation    Start Time 0825  -KG      PT Received On 01/19/25  -KG      PT Goal Re-Cert Due Date 01/29/25  -KG         Timed Charges    71291 - PT Therapeutic Activity Minutes 15  -KG         Total Minutes    Timed Charges Total Minutes 15  -KG       Total Minutes 15  -KG                User Key  (r) = Recorded By, (t) = Taken By, (c) = Cosigned By      Initials Name Provider Type    KG Luh Montalvo Physical Therapist                  Therapy Charges for Today       Code Description Service Date Service Provider Modifiers Qty    43185744093 HC PT EVAL MOD COMPLEXITY 4 1/19/2025 Luh Montalvo GP 1    19021476745 HC PT THERAPEUTIC ACT EA 15 MIN 1/19/2025 Selvin  Luh GP 1            PT G-Codes  Outcome Measure Options: AM-PAC 6 Clicks Basic Mobility (PT), Modified [place]  AM-PAC 6 Clicks Score (PT): 10  AM-PAC 6 Clicks Score (OT): 11  Modified [place] Scale: 4 - Moderately severe disability.  Unable to walk without assistance, and unable to attend to own bodily needs without assistance.  PT Discharge Summary  Anticipated Discharge Disposition (PT): inpatient rehabilitation facility    Luh Montalvo  1/19/2025

## 2025-01-19 NOTE — PROGRESS NOTES
Stroke Neurology Progress Note     Subjective     This patient was seen in follow-up for: acute right MCA territory infarct  Present for the encounter were: self, patient     Subjective:  I saw and examined the patient at bedside.  No acute events overnight.  Patient is more restless this morning. Hallucinating. Patient does not have capacity per my evaluation. Patient clinical exam is stable.  Patient demonstrates disorientation, slurred speech, mixed aphasia, left facial droop, right gaze deviation, and left hemiparesis on exam.  I attempted to call daughter yesterday to update on plan of care.  No answer.   left for call back.  Will reattempt today.    Objective      Temp:  [97.3 °F (36.3 °C)-98.7 °F (37.1 °C)] 97.3 °F (36.3 °C)  Heart Rate:  [62-79] 63  Resp:  [16-20] 18  BP: (161-198)/() 174/84            Objective    Physical Exam:  General Appearance: Alert, restless, disinhibited  HEENT: anicteric sclera, no scleral injection  Lungs: respirations appear comfortable, no obvious increased work of breathing  Extremities: No cyanosis or fingernail clubbing   Skin: No rashes in exposed skin areas     Neurological Examination:   Mental status: Alert.  Oriented to self only.  No dysarthria.  Slowed speech.  Mixed aphasia.  Follows intermittent commands, nonfluent speech.  Cranial Nerves: Visual fields intact. Extraocular movements intact with no nystagmus.  Hard of hearing.  Right gaze deviation.  Left facial droop.  Sensory: Normal sensory exam to light touch.  Motor: Normal tone.   Strength:  LUE: 0/5 biceps, triceps,   LLE: 1/5 hip flexion/extension  RUE: 5/5 biceps, triceps,   RLE:  4+/5 hip flexion/extension      Labs:    Lab Results   Component Value Date    HGBA1C 12.00 (H) 01/18/2025      Lab Results   Component Value Date    CHOL 133 01/18/2025    TRIG 129 01/18/2025    HDL 34 (L) 01/18/2025    LDL 76 01/18/2025       Lab Results   Component Value Date    WBC 7.96 01/19/2025    HGB 15.5  01/19/2025    HCT 47.8 (H) 01/19/2025    MCV 90.0 01/19/2025     01/19/2025     Lab Results   Component Value Date    GLUCOSE 200 (H) 01/19/2025    BUN 16 01/19/2025    CREATININE 1.21 (H) 01/19/2025    EGFRIFNONA 63 09/02/2021    BCR 13.2 01/19/2025    CO2 24.0 01/19/2025    CALCIUM 9.2 01/19/2025    ALBUMIN 2.3 (L) 01/19/2025    AST 16 01/19/2025    ALT 13 01/19/2025       Results from last 7 days   Lab Units 01/19/25  0442 01/18/25  0416   SODIUM mmol/L 143 135*   POTASSIUM mmol/L 4.1 4.5   CHLORIDE mmol/L 108* 104   CO2 mmol/L 24.0 22.0   BUN mg/dL 16 20   CREATININE mg/dL 1.21* 1.47*   CALCIUM mg/dL 9.2 8.9   BILIRUBIN mg/dL 0.3 0.2   ALK PHOS U/L 113 101   ALT (SGPT) U/L 13 14   AST (SGOT) U/L 16 15   GLUCOSE mg/dL 200* 168*         Results Review:      All brain images and reports were personally reviewed and I agree with the interpretations except as noted below.    MRI Brain Without Contrast 1/18/2025  Impression: Areas of acute infarct are present within the right MCA territory as above, with some mild early edema including overlying sulcal effacement, otherwise without evidence of midline shift or significant component of hemorrhage.         Transthoracic echocardiogram 1/19/2025  Impression:    Left ventricular systolic function is normal. Estimated left ventricular EF = 55%    Left ventricular wall thickness is consistent with mild concentric hypertrophy.    Mild mitral valve stenosis is present. The mitral valve mean gradient is 6 mmHg.    Mild mitral regurgitation.    Trace tricuspid regurgitation.    Saline test results are negative for intracardiac shunting.    No cardiac source for embolus is seen.    Bilateral lower extremity ultrasound 1/18/2025  Impression: No evidence of deep or superficial venous thrombus in the right or left lower extremities.    Right upper extremity ultrasound 1/18/2025  Impression: No evidence of deep or superficial venous thrombus in the right upper  extremity    Carotid ultrasound performed at outside hospital less than 50% stenoses bilaterally        Assessment/Plan     Assessment:    Roselia Rossi is a 64-year-old left-handed female with a past medical history of right hemispheric watershed strokes (2023, residual left hand weakness), hypertension, hyperlipidemia, CAD, CABG, CHF, DMT2 (hemoglobin A1c 12%), CKD, thyroid disease who presented to [facility] on 1/14/2025 with left hemiparesis.  There, MRI 1/14/2025 revealed multifocal right hemispheric infarcts.  She was started aspirin, clopidogrel, and atorvastatin.  On 1/16/2025 she was noted to be more lethargic and weak.  She has been unable to undergo repeat MRI and MRA even with mild sedation.  She was subsequently transferred to [facility] for higher level of care.    # Acute right hemispheric infarct dominant hemisphere   I suspect cardioembolic etiology based on location of infarct and large stroke.  No known history of atrial fibrillation.    -Unable to obtain vessel imaging with mild sedation  -Patient was found to be a clopidogrel nonresponder with P2Y12 level 218  -Continue Brilinta 60 mg BID  -Can increase dose to 90 mg twice daily in clinic but will hold given large burden stroke and bleed risk  -Continue aspirin 81 mg daily  -Continue atorvastatin 80 mg daily LDL 76  -EEG pending  -Monitor telemetry for atrial fibrillation  -If no atrial fibrillation is found on telemetry, patient will need a cardiac monitor prior to discharge  -PT OT evaluated the patient and recommend IRF  -Patient may need formal capacity evaluation   -Neuro-checks per unit protocol while inpatient  -Blood pressure goal: SBP <180  -DVT prophylaxis: SCD, heparin  -Follow-up in stroke clinic     I updated the patient's daughter Rhina on plan of care as above.      Patient education: call 911 or present to emergency department with any stroke symptom, including unilateral face, arm, or leg weakness,  numbness, or paresthesias, unilateral facial droop, speech deficits, dizziness with nausea, vomiting, nystagmus, and incoordination, visual deficits, or severe onset headache.    Stroke neurology will follow results of above workup.  Please call with questions.     Maria Isabel Young MD  [facility] [place]  01/19/25  11:47 EST

## 2025-01-19 NOTE — THERAPY EVALUATION
Patient Name: Roselia Rossi  : 1960    MRN: [id number]                              Today's Date: 2025       Admit Date: 2025    Visit Dx:     ICD-10-CM ICD-9-CM   1. Cognitive communication deficit  R41.841 799.52   2. Dysphagia, unspecified type  R13.10 787.20     Patient Active Problem List   Diagnosis    Abnormal stress test    Type 2 diabetes mellitus with hyperglycemia    Stroke-like symptoms     Past Medical History:   Diagnosis Date    Coronary artery disease     Diabetes mellitus     Disease of thyroid gland     Hyperlipidemia     Hypertension      Past Surgical History:   Procedure Laterality Date    CARDIAC CATHETERIZATION N/A 2017    Procedure: Left Heart Cath;  Surgeon: Tommy Haider MD;  Location:  [facility];  Service:     CARDIAC CATHETERIZATION N/A 2021    Procedure: Left Heart Cath;  Surgeon: Tommy Haider MD;  Location:  [facility];  Service: Cardiovascular;  Laterality: N/A;    CHOLECYSTECTOMY      COLONOSCOPY      CORONARY ARTERY BYPASS GRAFT      TUBAL ABDOMINAL LIGATION        General Information       Row Name 25 0820          OT Time and Intention    Document Type evaluation  -SW     Mode of Treatment occupational therapy  -       Row Name 25 0820          General Information    Patient Profile Reviewed yes  -SW     Prior Level of Function independent:;all household mobility;ADL's  -SW     Existing Precautions/Restrictions fall  -     Barriers to Rehab medically complex;previous functional deficit;hearing deficit  -       Row Name 25 0820          Occupational Profile    Environmental Supports and Barriers (Occupational Profile) Pt lives alone at home. Pt has a tub shower with a seat, says she does not use ae.  -       Row Name 25 0820          Living Environment    People in Home alone  -       Row Name 25 0820          Home Main Entrance    Number of Stairs, Main  Entrance four  -       Row Name 01/19/25 0820          Stairs Within Home, Primary    Number of Stairs, Within Home, Primary none  -       Row Name 01/19/25 0820          Cognition    Orientation Status (Cognition) oriented x 4  -       Row Name 01/19/25 0820          Safety Issues/Impairments Affecting Functional Mobility    Safety Issues Affecting Function (Mobility) friction/shear risk;insight into deficits/self-awareness;problem-solving;judgment;safety precaution awareness;safety precautions follow-through/compliance;awareness of need for assistance;sequencing abilities  -     Impairments Affecting Function (Mobility) balance;cognition;coordination;endurance/activity tolerance;grasp;motor control;motor planning;strength;sensation/sensory awareness;postural/trunk control  -               User Key  (r) = Recorded By, (t) = Taken By, (c) = Cosigned By      Initials Name Provider Type    Betsy Anderson OT Occupational Therapist                     Mobility/ADL's       Row Name 01/19/25 0820          Bed Mobility    Bed Mobility rolling left;supine-sit;sit-supine  -     Rolling Left [place] (Bed Mobility) minimum assist (75% patient effort)  -     Supine-Sit [place] (Bed Mobility) maximum assist (25% patient effort);verbal cues  -     Sit-Supine [place] (Bed Mobility) maximum assist (25% patient effort);verbal cues  -     Assistive Device (Bed Mobility) bed rails;head of bed elevated;repositioning sheet  -       Row Name 01/19/25 0820          Transfers    Transfers sit-stand transfer  -       Row Name 01/19/25 0820          Sit-Stand Transfer    Sit-Stand [place] (Transfers) maximum assist (25% patient effort);2 person assist  -     Comment, (Sit-Stand Transfer) Pt became nauseated and needed to lay back down.  -       Row Name 01/19/25 0820          Functional Mobility    Functional Mobility- Ind. Level unable to perform  -       Row Name 01/19/25 0820           Activities of Daily Living    BADL Assessment/Intervention grooming;feeding  -       Row Name 01/19/25 0820          Grooming Assessment/Training    [place] Level (Grooming) grooming skills;wash face, hands;minimum assist (75% patient effort)  -     Position (Grooming) sitting up in bed  -       Row Name 01/19/25 0820          Self-Feeding Assessment/Training    [place] Level (Feeding) feeding skills;liquids to mouth;set up  -     Position (Feeding) sitting up in bed  -               User Key  (r) = Recorded By, (t) = Taken By, (c) = Cosigned By      Initials Name Provider Type    Betsy Anderson OT Occupational Therapist                   Obj/Interventions       Row Name 01/19/25 0820          Sensory Assessment (Somatosensory)    Sensory Assessment (Somatosensory) left UE  -     Left UE Sensory Assessment general sensation  -     Sensory Subjective Reports numbness;tingling  -[facility] Name 01/19/25 0820          Vision Assessment/Intervention    Visual Impairment/Limitations WFL  -[facility] Name 01/19/25 0820          Range of Motion Comprehensive    General Range of Motion upper extremity range of motion deficits identified  -     Comment, General Range of Motion RUE wfl, LUE limited - no arom and pain/discomfort with PROM to LUE.  -       Row Name 01/19/25 0820          Strength Comprehensive (MMT)    General Manual Muscle Testing (MMT) Assessment upper extremity strength deficits identified  -     Comment, General Manual Muscle Testing (MMT) Assessment RUE 5/5, LUE flaccid  -[facility] Name 01/19/25 0820          Balance    Balance Assessment sitting static balance;sitting dynamic balance;sit to stand dynamic balance;standing static balance  -     Static Sitting Balance moderate assist  -     Dynamic Sitting Balance maximum assist;2-person assist  -     Position, Sitting Balance supported  -     Sit to Stand Dynamic Balance maximum assist;2-person assist  -      Static Standing Balance maximum assist;2-person assist;verbal cues;non-verbal cues (demo/gesture)  -     Position/Device Used, Standing Balance supported  -     Balance Interventions sitting;standing;sit to stand;supported;static;dynamic;minimal challenge;occupation based/functional task  -               User Key  (r) = Recorded By, (t) = Taken By, (c) = Cosigned By      Initials Name Provider Type    Betsy Anderson OT Occupational Therapist                   Goals/Plan       Row Name 01/19/25 0820          Bed Mobility Goal 1 (OT)    Activity/Assistive Device (Bed Mobility Goal 1, OT) supine to sit  -SW     [place] Level/Cues Needed (Bed Mobility Goal 1, OT) moderate assist (50-74% patient effort)  -SW     Time Frame (Bed Mobility Goal 1, OT) long term goal (LTG);by discharge  -SW     Progress/Outcomes (Bed Mobility Goal 1, OT) goal ongoing  -       Row Name 01/19/25 0820          Transfer Goal 1 (OT)    Activity/Assistive Device (Transfer Goal 1, OT) toilet  -SW     [place] Level/Cues Needed (Transfer Goal 1, OT) maximum assist (25-49% patient effort)  -SW     Time Frame (Transfer Goal 1, OT) short term goal (STG);by discharge  -SW     Progress/Outcome (Transfer Goal 1, OT) goal ongoing  -       Row Name 01/19/25 0820          Therapy Assessment/Plan (OT)    Planned Therapy Interventions (OT) activity tolerance training;adaptive equipment training;BADL retraining;functional balance retraining;strengthening exercise;ROM/therapeutic exercise;occupation/activity based interventions;passive ROM/stretching;patient/caregiver education/training;transfer/mobility retraining;neuromuscular control/coordination retraining  -               User Key  (r) = Recorded By, (t) = Taken By, (c) = Cosigned By      Initials Name Provider Type    Betsy Anderson OT Occupational Therapist                   Clinical Impression       Row Name 01/19/25 0820          Pain Assessment    Pretreatment Pain Rating 0/10 -  no pain  -SW     Posttreatment Pain Rating 0/10 - no pain  -       Row Name 01/19/25 0820          Plan of Care Review    Plan of Care Reviewed With patient  -SW     Outcome Evaluation OT eval complete. Pt presents with L sided weakness, decline in balance with lateral lean to L, [place], and overall decline in adl indep. Pt min assist rolling to Left with cues, max assist x 2 for supine to sit, dep x 2 for sit to supine, max assist sitting balance eob, dep for donning socks, setup for feeding, min assist for grooming. Recommend IPOT and IRF at d/c.  -[facility] Name 01/19/25 0820          Therapy Assessment/Plan (OT)    Rehab Potential (OT) good  -     Criteria for Skilled Therapeutic Interventions Met (OT) yes;meets criteria;skilled treatment is necessary  -     Therapy Frequency (OT) daily  -[facility] Name 01/19/25 0820          Therapy Plan Review/Discharge Plan (OT)    Anticipated Discharge Disposition (OT) inpatient rehabilitation facility  -[facility] Name 01/19/25 0820          Vital Signs    Pre Systolic BP Rehab 174  -SW     Pre Treatment Diastolic BP 84  -SW     Intra Systolic BP Rehab 146  -SW     Intra Treatment Diastolic BP 99  -SW     Pretreatment Heart Rate (beats/min) 64  -SW     Posttreatment Heart Rate (beats/min) 66  -SW     Pre SpO2 (%) 94  -SW     O2 Delivery Pre Treatment supplemental O2  -SW     O2 Delivery Intra Treatment supplemental O2  -SW     Post SpO2 (%) 95  -SW     O2 Delivery Post Treatment supplemental O2  -SW     Pre Patient Position Supine  -SW     Intra Patient Position Sitting  -SW     Post Patient Position Supine  -SW       [facility] Name 01/19/25 0820          Positioning and Restraints    Pre-Treatment Position in bed  -SW     Post Treatment Position bed  -SW     In Bed notified nsg;fowlers;call light within reach;encouraged to call for assist;exit alarm on;side rails up x3  -SW               User Key  (r) = Recorded By, (t) = Taken By, (c) = Cosigned By      Initials Name  Provider Type    Betsy Anderson OT Occupational Therapist                   Outcome Measures       Row Name 01/19/25 1050          How much help from another is currently needed...    Putting on and taking off regular lower body clothing? 1  -SW     Bathing (including washing, rinsing, and drying) 2  -SW     Toileting (which includes using toilet bed pan or urinal) 1  -SW     Putting on and taking off regular upper body clothing 1  -SW     Taking care of personal grooming (such as brushing teeth) 3  -SW     Eating meals 3  -     AM-PAC 6 Clicks Score (OT) 11  -       Row Name 01/19/25 1052          Modified [place] Scale    Pre-Stroke Modified [place] Scale 2 - Slight disability.  Unable to carry out all previous activities but able to look after own affairs without assistance.  -     Modified [place] Scale 4 - Moderately severe disability.  Unable to walk without assistance, and unable to attend to own bodily needs without assistance.  -       Row Name 01/19/25 1052 01/19/25 1050       Functional Assessment    Outcome Measure Options Modified [place]  - AM-PAC 6 Clicks Daily Activity (OT)  -              User Key  (r) = Recorded By, (t) = Taken By, (c) = Cosigned By      Initials Name Provider Type    Betsy Anderson OT Occupational Therapist                    Occupational Therapy Education       Title: PT OT SLP Therapies (In Progress)       Topic: Occupational Therapy (In Progress)       Point: ADL training (Done)       Description:   Instruct learner(s) on proper safety adaptation and remediation techniques during self care or transfers.   Instruct in proper use of assistive devices.                  Learning Progress Summary            Patient Acceptance, E, VU by ERMA at 1/19/2025 1051                      Point: Home exercise program (Not Started)       Description:   Instruct learner(s) on appropriate technique for monitoring, assisting and/or progressing therapeutic exercises/activities.                   Learner Progress:  Not documented in this visit.              Point: Precautions (Done)       Description:   Instruct learner(s) on prescribed precautions during self-care and functional transfers.                  Learning Progress Summary            Patient Acceptance, E, VU by ERMA at 1/19/2025 1051                      Point: Body mechanics (Done)       Description:   Instruct learner(s) on proper positioning and spine alignment during self-care, functional mobility activities and/or exercises.                  Learning Progress Summary            Patient Acceptance, E, VU by  at 1/19/2025 1051                                      User Key       Initials Effective Dates Name Provider Type Discipline    ERMA 06/16/21 -  Betsy Guardado OT Occupational Therapist OT                  OT Recommendation and Plan  Planned Therapy Interventions (OT): activity tolerance training, adaptive equipment training, BADL retraining, functional balance retraining, strengthening exercise, ROM/therapeutic exercise, occupation/activity based interventions, passive ROM/stretching, patient/caregiver education/training, transfer/mobility retraining, neuromuscular control/coordination retraining  Therapy Frequency (OT): daily  Plan of Care Review  Plan of Care Reviewed With: patient  Outcome Evaluation: OT eval complete. Pt presents with L sided weakness, decline in balance with lateral lean to L, [place], and overall decline in adl indep. Pt min assist rolling to Left with cues, max assist x 2 for supine to sit, dep x 2 for sit to supine, max assist sitting balance eob, dep for donning socks, setup for feeding, min assist for grooming. Recommend IPOT and IRF at d/c.     Time Calculation:   Evaluation Complexity (OT)  Review Occupational Profile/Medical/Therapy History Complexity: expanded/moderate complexity  Assessment, Occupational Performance/Identification of Deficit Complexity: 5 or more performance deficits  Clinical Decision Making  Complexity (OT): detailed assessment/moderate complexity  Overall Complexity of Evaluation (OT): moderate complexity     Time Calculation- OT       Row Name 01/19/25 0820             Time Calculation- OT    OT Start Time 0820  -SW      OT Received On 01/19/25  -SW      OT Goal Re-Cert Due Date 01/29/25  -SW         Timed Charges    94630 - OT Self Care/Mgmt Minutes 25  -SW         Untimed Charges    OT Eval/Re-eval Minutes 40  -SW         Total Minutes    Timed Charges Total Minutes 25  -SW      Untimed Charges Total Minutes 40  -SW       Total Minutes 65  -SW                User Key  (r) = Recorded By, (t) = Taken By, (c) = Cosigned By      Initials Name Provider Type    SW Betsy Guardado OT Occupational Therapist                  Therapy Charges for Today       Code Description Service Date Service Provider Modifiers Qty    56561980259 HC OT SELF CARE/MGMT/TRAIN EA 15 MIN 1/19/2025 Betsy Guardado OT GO 2    21486248975 HC OT EVAL MOD COMPLEXITY 3 1/19/2025 Betsy Guardado OT GO 1                 Betsy Guardado OT  1/19/2025

## 2025-01-19 NOTE — PLAN OF CARE
Goal Outcome Evaluation:  Plan of Care Reviewed With: patient           Outcome Evaluation: OT eval complete. Pt presents with L sided weakness, decline in balance with lateral lean to L, [place], and overall decline in adl indep. Pt min assist rolling to Left with cues, max assist x 2 for supine to sit, dep x 2 for sit to supine, max assist sitting balance eob, dep for donning socks, setup for feeding, min assist for grooming. Recommend IPOT and IRF at d/c.    Anticipated Discharge Disposition (OT): inpatient rehabilitation facility

## 2025-01-19 NOTE — CASE MANAGEMENT/SOCIAL WORK
Discharge Planning Assessment  [facility]     Patient Name: Roselia Rossi  MRN: [id number]  Today's Date: 1/19/2025    Admit Date: 1/17/2025    Plan: Rehab   Discharge Needs Assessment       Row Name 01/19/25 1247       Living Environment    People in Home alone    Current Living Arrangements home    Potentially Unsafe Housing Conditions none    In the past 12 months has the electric, gas, oil, or water company threatened to shut off services in your home? No    Primary Care Provided by self    Provides Primary Care For no one, unable/limited ability to care for self    Family Caregiver if Needed child(janna), adult    Family Caregiver Names dtr Rhina [phone]    Quality of Family Relationships involved    Able to Return to Prior Arrangements no    Living Arrangement Comments Needs inpat rehab       Resource/Environmental Concerns    Resource/Environmental Concerns none    Transportation Concerns none       Transportation Needs    In the past 12 months, has lack of transportation kept you from medical appointments or from getting medications? no    In the past 12 months, has lack of transportation kept you from meetings, work, or from getting things needed for daily living? No       Food Insecurity    Within the past 12 months, you worried that your food would run out before you got the money to buy more. Never true    Within the past 12 months, the food you bought just didn't last and you didn't have money to get more. Never true       Transition Planning    Patient/Family Anticipates Transition to inpatient rehabilitation facility    Patient/Family Anticipated Services at Transition     Transportation Anticipated health plan transportation       Discharge Needs Assessment    Equipment Currently Used at Home cane, laurel;glucometer    Concerns to be Addressed discharge planning    Do you want help finding or keeping work or a job? I do not need or want help    Do you want help with school or  training? For example, starting or completing job training or getting a high school diploma, GED or equivalent No    Anticipated Changes Related to Illness inability to care for self    Outpatient/Agency/Support Group Needs inpatient rehabilitation facility    Discharge Coordination/Progress Needs inpat rehab.                   Discharge Plan       Row Name 01/19/25 7554       Plan    Plan Rehab    Patient/Family in Agreement with Plan yes    Plan Comments I talked with patient at the , she was eating, sitting up in the bed and very [place]. Lives in a one story home in [place]. Tells me she had HH in past but not now. Tells me she was at  last April and is okay to go back there. I tried to call  dthermes Lantigua, no answer and left a VM. Patient uses a cane at times and tells me she was driving before this event. Not sure that is true. Patient takes her own meds and independent at home, not sure about that either. Patient has [employer] but has worked since last Spring. I talked with christina Lantigua and she doen't know anything about patient's finances or has access to them. Rhina hasn't talked with  her mom for 7 months this time. Per Rhina patient wasn't able to take care of herself before this event. CM following and will assist in d/c plans and disposition.    Final Discharge Disposition Code 62 - inpatient rehab facility                  Continued Care and Services - Admitted Since 1/17/2025    No active coordination exists for this encounter.          Demographic Summary       Row Name 01/19/25 3066       General Information    Admission Type inpatient    Referral Source admission list    Reason for Consult discharge planning    Preferred Language English       Contact Information    Permission Granted to Share Info With     Contact Information Obtained for     Contact Information Comments PCP; Leatha Grigsby                   Functional Status       Row Name 01/19/25 7817        Functional Status    Usual Activity Tolerance fair    Current Activity Tolerance poor       Physical Activity    On average, how many days per week do you engage in moderate to strenuous exercise (like a brisk walk)? 0 days    On average, how many minutes do you engage in exercise at this level? 0 min    Number of minutes of exercise per week 0       Functional Status, IADL    Medications independent    Meal Preparation assistive person    Housekeeping assistive person    Laundry assistive person    Shopping assistive person    If for any reason you need help with day-to-day activities such as bathing, preparing meals, shopping, managing finances, etc., do you get the help you need? I could use a little more help    IADL Comments Has coverage for meds       Mental Status    General Appearance WDL WDL       Mental Status Summary    Recent Changes in Mental Status/Cognitive Functioning hearing                   Psychosocial    No documentation.                  Abuse/Neglect    No documentation.                  Legal    No documentation.                  Substance Abuse    No documentation.                  Patient Forms    No documentation.                     Luisa Yu RN

## 2025-01-19 NOTE — PLAN OF CARE
Goal Outcome Evaluation:  Plan of Care Reviewed With: patient        Progress: no change  Outcome Evaluation: PT eval performed: Pt presenting below baseline with significant L side weakness, flaccid LUE.    STS max-A x2, BUE support, able to maintain some weight on LLE but very weak. Pt became very nauseas, drop in BP from 174/84 to 146/99. Pt needed to be laid back down. Significat left lateral lean in sitting requiring constant cues for centering.  Recommend IPR at d/c    Anticipated Discharge Disposition (PT): inpatient rehabilitation facility

## 2025-01-19 NOTE — PROGRESS NOTES
[facility] Medicine Services  PROGRESS NOTE    Patient Name: Roselia Rossi  : 1960  MRN: [id number]    Date of Admission: 2025  Primary Care Physician: Leatha Grigsby MD    Subjective   Subjective     CC: f/u cva    HPI: Up in bed with PT. Dizzy when she sat up. Breathing better.       Objective   Objective     Vital Signs:   Temp:  [97.3 °F (36.3 °C)-98.7 °F (37.1 °C)] 97.3 °F (36.3 °C)  Heart Rate:  [62-79] 63  Resp:  [16-20] 18  BP: (160-198)/() 174/84  Flow (L/min) (Oxygen Therapy):  [2-4] 2     Physical Exam:  Constitutional: No acute distress, awake, alert  HENT: NCAT, mucous membranes moist, 2L O2  Respiratory: Clear to auscultation bilaterally, respiratory effort normal   Cardiovascular: RRR, no murmurs, rubs, or gallops  Gastrointestinal: Positive bowel sounds, soft, nontender, nondistended, obese  Musculoskeletal: No bilateral ankle edema  Psychiatric: Appropriate affect, cooperative  Neurologic: Oriented x 3, left hemiparesis  Skin: No rashes     Results Reviewed:  LAB RESULTS:      Lab 256 25  0000 25   WBC 7.96 8.62  --   --    HEMOGLOBIN 15.5 13.2  --   --    HEMATOCRIT 47.8* 41.2  --   --    PLATELETS 240 207  --   --    NEUTROS ABS 5.53 6.06  --   --    IMMATURE GRANS (ABS) 0.06* 0.03  --   --    LYMPHS ABS 1.26 1.47  --   --    MONOS ABS 0.59 0.66  --   --    EOS ABS 0.48* 0.36  --   --    MCV 90.0 90.9  --   --    LACTATE  --   --  1.0  --    PROTIME  --   --   --  10.9   APTT  --   --   --  23.7         Lab 25  0416 25   SODIUM 143 135*  --    POTASSIUM 4.1 4.5  --    CHLORIDE 108* 104  --    CO2 24.0 22.0  --    ANION GAP 11.0 9.0  --    BUN 16 20  --    CREATININE 1.21* 1.47*  --    EGFR 49.5* 39.2*  --    GLUCOSE 200* 168*  --    CALCIUM 9.2 8.9  --    MAGNESIUM  --   --  2   HEMOGLOBIN A1C  --  12.00*  --          Lab 25  0442 25  0412   TOTAL  PROTEIN 5.5* 5.1*   ALBUMIN 2.3* 2.1*   GLOBULIN 3.2 3.0   ALT (SGPT) 13 14   AST (SGOT) 16 15   BILIRUBIN 0.3 0.2   ALK PHOS 113 101         Lab 01/13/25  2041   PROTIME 10.9   INR 1.01         Lab 01/18/25  0416   CHOLESTEROL 133   LDL CHOL 76   HDL CHOL 34*   TRIGLYCERIDES 129         Lab 01/18/25  0000   FOLATE 6.70   VITAMIN B 12 >2,000*         Brief Urine Lab Results  (Last result in the past 365 days)        Color   Clarity   Blood   Leuk Est   Nitrite   Protein   CREAT   Urine HCG        11/05/24 1519             97.2                 Microbiology Results Abnormal       None            Duplex Venous Upper Extremity - Right CAR    Result Date: 1/18/2025    No evidence of deep or superficial venous thrombus in the right upper extremity.     Duplex Venous Lower Extremity - Bilateral CAR    Result Date: 1/18/2025    No evidence of deep or superficial venous thrombus in the right or left lower extremities.     XR Chest 1 View    Result Date: 1/18/2025  XR CHEST 1 VW Date of Exam: 1/18/2025 10:41 AM EST Indication: Soa Comparison: None available. Findings: Sternotomy wires are noted. Heart shadow appears enlarged. There is dense bilateral interstitial and airspace disease throughout the lungs in a pattern suggesting extensive pulmonary edema. There are probably small pleural effusions as well. No pneumothorax is seen.     Impression: Impression: Dense and extensive bilateral pulmonary disease in a pattern favoring pulmonary edema as etiology. Electronically Signed: Ismael Foote MD  1/18/2025 11:06 AM EST  Workstation ID: [id number]    MRI Brain Without Contrast    Result Date: 1/18/2025  MRI BRAIN WO CONTRAST Date of Exam: 1/18/2025 1:49 AM EST Indication: Stroke, follow up stroke.  Comparison: None available. Technique:  Routine multiplanar/multisequence sequence images of the brain were obtained without contrast administration. Findings: Diffusion restriction is present compatible with acute infarct in the right MCA  territory, prominently involving the right posterior temporal lobe with additional scattered frontal and parietal involvement. Evaluation is somewhat degraded by patient motion. There is some early right-sided cerebral edema with overlying sulcal effacement, otherwise without evidence of midline shift. Some areas of cortical intrinsic T1 shortening are present consistent with early laminar necrosis. There is no evidence of significant component of hemorrhage. The ventricles are normal in size and configuration, accounting for some mild generalized volume loss. No unexpected mass or mass effect is evident elsewhere. The orbits are normal. The paranasal sinuses appear clear.     Impression: Impression: Areas of acute infarct are present within the right MCA territory as above, with some mild early edema including overlying sulcal effacement, otherwise without evidence of midline shift or significant component of hemorrhage. Electronically Signed: Florian Lobo MD  1/18/2025 5:08 AM EST  Workstation ID: [id number]     Results for orders placed during the hospital encounter of 01/17/25    Adult Transthoracic Echo Complete W/ Cont if Necessary Per Protocol (With Agitated Saline)    Interpretation Summary    Left ventricular systolic function is normal. Estimated left ventricular EF = 55%    Left ventricular wall thickness is consistent with mild concentric hypertrophy.    Mild mitral valve stenosis is present. The mitral valve mean gradient is 6 mmHg.    Mild mitral regurgitation.    Trace tricuspid regurgitation.    Saline test results are negative for intracardiac shunting.    No cardiac source for embolus is seen.      Current medications:  Scheduled Meds:aspirin, 81 mg, Oral, Daily   Or  aspirin, 300 mg, Rectal, Daily  atorvastatin, 80 mg, Oral, Nightly  heparin (porcine), 5,000 Units, Subcutaneous, Q12H  insulin lispro, 2-9 Units, Subcutaneous, 4x Daily AC & at Bedtime  ipratropium-albuterol, 3 mL, Nebulization, 4x  Daily - RT  labetalol, 10 mg, Intravenous, Once  metoprolol succinate XL, 25 mg, Oral, Q24H  sacubitril-valsartan, 1 tablet, Oral, Q12H  sodium chloride, 10 mL, Intravenous, Q12H  sodium chloride, 10 mL, Intravenous, Q12H  ticagrelor, 60 mg, Oral, BID      Continuous Infusions:   PRN Meds:.  acetaminophen    senna-docusate sodium **AND** polyethylene glycol **AND** bisacodyl **AND** bisacodyl    Calcium Replacement - Follow Nurse / BPA Driven Protocol    dextrose    dextrose    glucagon (human recombinant)    Magnesium Standard Dose Replacement - Follow Nurse / BPA Driven Protocol    melatonin    ondansetron    Potassium Replacement - Follow Nurse / BPA Driven Protocol    sodium chloride    sodium chloride    sodium chloride    sodium chloride    Assessment & Plan   Assessment & Plan     Active Hospital Problems    Diagnosis  POA    **Stroke-like symptoms [R29.90]  Yes      Resolved Hospital Problems   No resolved problems to display.        Brief Hospital Course to date:  Roselia Rossi is a 64-year-old female with a PMH significant for right hemispheric watershed strokes (2023, residual left hand weakness), uncontrolled HTN, uncontrolled HLD, CAD, CABG, CHF, uncontrolled T2DM, CKD, and thyroid disease who presents to [facility] via EMS transfer from [facility] with complaints of multifocal right hemispheric infarcts.  Patient initially presented to OSH on 1/14/2025 with complaints of left-sided weakness.  She underwent an MRI on 1/14 that revealed multifocal right hemispheric areas of restricted diffusion.  She was started on DAPT with aspirin and Plavix as well as high-dose statin.  On 1/16/2025, she was noted to be more lethargic and generally weak.  She was unable to undergo repeat MRI and MRA secondary to agitation.  Repeat CT head per OSH MD with evolving infarcts and no hemorrhage.      Acute multifocal CVA  HTN  -Stroke team following, continue DAPT+statin  -Continue slow adjustments to BP  mgmt.  -PT/OT/SLP. Will need rehab.     A/C HFpEF   Hypoxia  -CXR today with pulmonary edema. Echo w/ hfpef.  -Resume entresto, metoprolol.  -Repeat IV lasix.     Hyperglycemia w/ DMII  -SSI     Hx of hypothyroidism:   Will continue home medications     Expected Discharge Location and Transportation:   Expected Discharge   Expected discharge date/ time has not been documented.     VTE Prophylaxis:  Pharmacologic & mechanical VTE prophylaxis orders are present.         AM-PAC 6 Clicks Score (PT): 6 (01/18/25 2000)    CODE STATUS:   Code Status and Medical Interventions: CPR (Attempt to Resuscitate); Full Support   Ordered at: 01/17/25 1444     Level Of Support Discussed With:    Patient     Code Status (Patient has no pulse and is not breathing):    CPR (Attempt to Resuscitate)     Medical Interventions (Patient has pulse or is breathing):    Full Support       Deb Chatman II, DO  01/19/25

## 2025-01-20 ENCOUNTER — APPOINTMENT (OUTPATIENT)
Dept: NEUROLOGY | Facility: HOSPITAL | Age: 65
End: 2025-01-20
Payer: COMMERCIAL

## 2025-01-20 LAB
ALBUMIN SERPL-MCNC: 2.1 G/DL (ref 3.5–5.2)
ALBUMIN/GLOB SERPL: 0.7 G/DL
ALP SERPL-CCNC: 107 U/L (ref 39–117)
ALT SERPL W P-5'-P-CCNC: 11 U/L (ref 1–33)
ANION GAP SERPL CALCULATED.3IONS-SCNC: 11 MMOL/L (ref 5–15)
AST SERPL-CCNC: 19 U/L (ref 1–32)
BASOPHILS # BLD AUTO: 0.05 10*3/MM3 (ref 0–0.2)
BASOPHILS NFR BLD AUTO: 0.6 % (ref 0–1.5)
BILIRUB SERPL-MCNC: 0.3 MG/DL (ref 0–1.2)
BUN SERPL-MCNC: 15 MG/DL (ref 8–23)
BUN/CREAT SERPL: 13.8 (ref 7–25)
CALCIUM SPEC-SCNC: 9 MG/DL (ref 8.6–10.5)
CHLORIDE SERPL-SCNC: 107 MMOL/L (ref 98–107)
CO2 SERPL-SCNC: 24 MMOL/L (ref 22–29)
CREAT SERPL-MCNC: 1.09 MG/DL (ref 0.57–1)
DEPRECATED RDW RBC AUTO: 45.6 FL (ref 37–54)
EGFRCR SERPLBLD CKD-EPI 2021: 56.1 ML/MIN/1.73
EOSINOPHIL # BLD AUTO: 0.36 10*3/MM3 (ref 0–0.4)
EOSINOPHIL NFR BLD AUTO: 4.5 % (ref 0.3–6.2)
ERYTHROCYTE [DISTWIDTH] IN BLOOD BY AUTOMATED COUNT: 14.2 % (ref 12.3–15.4)
GLOBULIN UR ELPH-MCNC: 2.9 GM/DL
GLUCOSE BLDC GLUCOMTR-MCNC: 128 MG/DL (ref 70–130)
GLUCOSE BLDC GLUCOMTR-MCNC: 129 MG/DL (ref 70–130)
GLUCOSE BLDC GLUCOMTR-MCNC: 162 MG/DL (ref 70–130)
GLUCOSE BLDC GLUCOMTR-MCNC: 171 MG/DL (ref 70–130)
GLUCOSE SERPL-MCNC: 152 MG/DL (ref 65–99)
HCT VFR BLD AUTO: 46.5 % (ref 34–46.6)
HGB BLD-MCNC: 15.3 G/DL (ref 12–15.9)
IMM GRANULOCYTES # BLD AUTO: 0.05 10*3/MM3 (ref 0–0.05)
IMM GRANULOCYTES NFR BLD AUTO: 0.6 % (ref 0–0.5)
LYMPHOCYTES # BLD AUTO: 1.81 10*3/MM3 (ref 0.7–3.1)
LYMPHOCYTES NFR BLD AUTO: 22.8 % (ref 19.6–45.3)
MCH RBC QN AUTO: 29.3 PG (ref 26.6–33)
MCHC RBC AUTO-ENTMCNC: 32.9 G/DL (ref 31.5–35.7)
MCV RBC AUTO: 88.9 FL (ref 79–97)
MONOCYTES # BLD AUTO: 0.64 10*3/MM3 (ref 0.1–0.9)
MONOCYTES NFR BLD AUTO: 8.1 % (ref 5–12)
NEUTROPHILS NFR BLD AUTO: 5.04 10*3/MM3 (ref 1.7–7)
NEUTROPHILS NFR BLD AUTO: 63.4 % (ref 42.7–76)
NRBC BLD AUTO-RTO: 0 /100 WBC (ref 0–0.2)
PLATELET # BLD AUTO: 211 10*3/MM3 (ref 140–450)
PMV BLD AUTO: 10.5 FL (ref 6–12)
POTASSIUM SERPL-SCNC: 3.9 MMOL/L (ref 3.5–5.2)
PROT SERPL-MCNC: 5 G/DL (ref 6–8.5)
RBC # BLD AUTO: 5.23 10*6/MM3 (ref 3.77–5.28)
SODIUM SERPL-SCNC: 142 MMOL/L (ref 136–145)
WBC NRBC COR # BLD AUTO: 7.95 10*3/MM3 (ref 3.4–10.8)

## 2025-01-20 PROCEDURE — 94799 UNLISTED PULMONARY SVC/PX: CPT

## 2025-01-20 PROCEDURE — 94761 N-INVAS EAR/PLS OXIMETRY MLT: CPT

## 2025-01-20 PROCEDURE — 25010000002 HEPARIN (PORCINE) PER 1000 UNITS: Performed by: STUDENT IN AN ORGANIZED HEALTH CARE EDUCATION/TRAINING PROGRAM

## 2025-01-20 PROCEDURE — 80053 COMPREHEN METABOLIC PANEL: CPT | Performed by: FAMILY MEDICINE

## 2025-01-20 PROCEDURE — 99233 SBSQ HOSP IP/OBS HIGH 50: CPT | Performed by: STUDENT IN AN ORGANIZED HEALTH CARE EDUCATION/TRAINING PROGRAM

## 2025-01-20 PROCEDURE — 82948 REAGENT STRIP/BLOOD GLUCOSE: CPT

## 2025-01-20 PROCEDURE — 95819 EEG AWAKE AND ASLEEP: CPT | Performed by: PSYCHIATRY & NEUROLOGY

## 2025-01-20 PROCEDURE — 85025 COMPLETE CBC W/AUTO DIFF WBC: CPT | Performed by: FAMILY MEDICINE

## 2025-01-20 PROCEDURE — 95819 EEG AWAKE AND ASLEEP: CPT

## 2025-01-20 PROCEDURE — 94664 DEMO&/EVAL PT USE INHALER: CPT

## 2025-01-20 PROCEDURE — 63710000001 INSULIN LISPRO (HUMAN) PER 5 UNITS: Performed by: FAMILY MEDICINE

## 2025-01-20 PROCEDURE — 99232 SBSQ HOSP IP/OBS MODERATE 35: CPT | Performed by: INTERNAL MEDICINE

## 2025-01-20 RX ORDER — CASTOR OIL AND BALSAM, PERU 788; 87 MG/G; MG/G
1 OINTMENT TOPICAL EVERY 12 HOURS SCHEDULED
Status: DISCONTINUED | OUTPATIENT
Start: 2025-01-20 | End: 2025-01-24 | Stop reason: HOSPADM

## 2025-01-20 RX ADMIN — IPRATROPIUM BROMIDE AND ALBUTEROL SULFATE 3 ML: 2.5; .5 SOLUTION RESPIRATORY (INHALATION) at 21:36

## 2025-01-20 RX ADMIN — Medication 10 ML: at 08:40

## 2025-01-20 RX ADMIN — INSULIN LISPRO 2 UNITS: 100 INJECTION, SOLUTION INTRAVENOUS; SUBCUTANEOUS at 22:08

## 2025-01-20 RX ADMIN — TICAGRELOR 60 MG: 60 TABLET ORAL at 08:38

## 2025-01-20 RX ADMIN — SACUBITRIL AND VALSARTAN 1 TABLET: 49; 51 TABLET, FILM COATED ORAL at 22:09

## 2025-01-20 RX ADMIN — ATORVASTATIN CALCIUM 80 MG: 40 TABLET, FILM COATED ORAL at 22:09

## 2025-01-20 RX ADMIN — INSULIN LISPRO 2 UNITS: 100 INJECTION, SOLUTION INTRAVENOUS; SUBCUTANEOUS at 17:30

## 2025-01-20 RX ADMIN — IPRATROPIUM BROMIDE AND ALBUTEROL SULFATE 3 ML: 2.5; .5 SOLUTION RESPIRATORY (INHALATION) at 13:54

## 2025-01-20 RX ADMIN — Medication 10 ML: at 22:08

## 2025-01-20 RX ADMIN — HEPARIN SODIUM 5000 UNITS: 5000 INJECTION INTRAVENOUS; SUBCUTANEOUS at 08:39

## 2025-01-20 RX ADMIN — METOPROLOL SUCCINATE 25 MG: 25 TABLET, EXTENDED RELEASE ORAL at 08:40

## 2025-01-20 RX ADMIN — TICAGRELOR 90 MG: 90 TABLET ORAL at 22:09

## 2025-01-20 RX ADMIN — Medication 1 APPLICATION: at 23:24

## 2025-01-20 RX ADMIN — ASPIRIN 81 MG CHEWABLE TABLET 81 MG: 81 TABLET CHEWABLE at 08:39

## 2025-01-20 RX ADMIN — SACUBITRIL AND VALSARTAN 1 TABLET: 49; 51 TABLET, FILM COATED ORAL at 08:38

## 2025-01-20 RX ADMIN — HEPARIN SODIUM 5000 UNITS: 5000 INJECTION INTRAVENOUS; SUBCUTANEOUS at 22:09

## 2025-01-20 RX ADMIN — IPRATROPIUM BROMIDE AND ALBUTEROL SULFATE 3 ML: 2.5; .5 SOLUTION RESPIRATORY (INHALATION) at 16:17

## 2025-01-20 NOTE — PROGRESS NOTES
[facility] Medicine Services  PROGRESS NOTE    Patient Name: Roselia Rossi  : 1960  MRN: [id number]    Date of Admission: 2025  Primary Care Physician: Leatha Grigsby MD    Subjective   Subjective     CC: f/u cva    HPI: Up in bed resting comfortably. No concerns from nursing.      Objective   Objective     Vital Signs:   Temp:  [97.4 °F (36.3 °C)-98 °F (36.7 °C)] 98 °F (36.7 °C)  Heart Rate:  [63-72] 72  Resp:  [18] 18  BP: (157-182)/() 157/94  Flow (L/min) (Oxygen Therapy):  [2] 2     Physical Exam:  Constitutional: No acute distress, [place]  HENT: NCAT, mucous membranes moist  Respiratory: Clear to auscultation bilaterally, respiratory effort normal   Cardiovascular: RRR, no murmurs, rubs, or gallops  Gastrointestinal: Positive bowel sounds, soft, nontender, nondistended  Musculoskeletal: No bilateral ankle edema  Psychiatric: Appropriate affect, cooperative  Neurologic: Oriented x 3 but some receptive aphasia, left weakness  Skin: No rashes     Results Reviewed:  LAB RESULTS:      Lab 256 25  0000 25   WBC 7.96 8.62  --   --    HEMOGLOBIN 15.5 13.2  --   --    HEMATOCRIT 47.8* 41.2  --   --    PLATELETS 240 207  --   --    NEUTROS ABS 5.53 6.06  --   --    IMMATURE GRANS (ABS) 0.06* 0.03  --   --    LYMPHS ABS 1.26 1.47  --   --    MONOS ABS 0.59 0.66  --   --    EOS ABS 0.48* 0.36  --   --    MCV 90.0 90.9  --   --    LACTATE  --   --  1.0  --    PROTIME  --   --   --  10.9   APTT  --   --   --  23.7         Lab 25  0416 25   SODIUM 143 135*  --    POTASSIUM 4.1 4.5  --    CHLORIDE 108* 104  --    CO2 24.0 22.0  --    ANION GAP 11.0 9.0  --    BUN 16 20  --    CREATININE 1.21* 1.47*  --    EGFR 49.5* 39.2*  --    GLUCOSE 200* 168*  --    CALCIUM 9.2 8.9  --    MAGNESIUM  --   --  2   HEMOGLOBIN A1C  --  12.00*  --          Lab 25  0442 25  0416   TOTAL PROTEIN 5.5* 5.1*    ALBUMIN 2.3* 2.1*   GLOBULIN 3.2 3.0   ALT (SGPT) 13 14   AST (SGOT) 16 15   BILIRUBIN 0.3 0.2   ALK PHOS 113 101         Lab 01/13/25  2041   PROTIME 10.9   INR 1.01         Lab 01/18/25  0416   CHOLESTEROL 133   LDL CHOL 76   HDL CHOL 34*   TRIGLYCERIDES 129         Lab 01/18/25  0000   FOLATE 6.70   VITAMIN B 12 >2,000*         Brief Urine Lab Results  (Last result in the past 365 days)        Color   Clarity   Blood   Leuk Est   Nitrite   Protein   CREAT   Urine HCG        11/05/24 1519             97.2                 Microbiology Results Abnormal       None            EEG    Result Date: 1/20/2025  Reason for referral: 64 y.o.female with eye twitching, consideration of seizures Technical Summary:  A 19 channel digital EEG was performed using the international 10-20 placement system, including eye leads and EKG leads. Duration: 24 minutes Findings: The patient sleeps throughout much of the study.  Diffuse low amplitude 5 to 7 Hz theta is present symmetrically over both hemispheres.  When the patient is briefly roused with auditory stimulation, there is an increase in EMG artifact in faster theta frequencies.  A clear posterior rhythm is not seen.  No focal features or epileptiform activity are present.  Photic stimulation does not change the background.  Hyperventilation is not performed. Video: Available Technical quality: Good Rhythm strip: Regular, 70 bpm SUMMARY: Mild generalized slow No focal features or epileptiform activity are seen     Impression: Diffuse cerebral dysfunction mild degree, nonspecific No evidence for epilepsy is seen on the study This report is transcribed using the Dragon dictation system.      Duplex Venous Upper Extremity - Right CAR    Result Date: 1/18/2025    No evidence of deep or superficial venous thrombus in the right upper extremity.     Duplex Venous Lower Extremity - Bilateral CAR    Result Date: 1/18/2025    No evidence of deep or superficial venous thrombus in the right  or left lower extremities.     XR Chest 1 View    Result Date: 1/18/2025  XR CHEST 1 VW Date of Exam: 1/18/2025 10:41 AM EST Indication: Soa Comparison: None available. Findings: Sternotomy wires are noted. Heart shadow appears enlarged. There is dense bilateral interstitial and airspace disease throughout the lungs in a pattern suggesting extensive pulmonary edema. There are probably small pleural effusions as well. No pneumothorax is seen.     Impression: Impression: Dense and extensive bilateral pulmonary disease in a pattern favoring pulmonary edema as etiology. Electronically Signed: Ismael Foote MD  1/18/2025 11:06 AM EST  Workstation ID: [id number]     Results for orders placed during the hospital encounter of 01/17/25    Adult Transthoracic Echo Complete W/ Cont if Necessary Per Protocol (With Agitated Saline)    Interpretation Summary    Left ventricular systolic function is normal. Estimated left ventricular EF = 55%    Left ventricular wall thickness is consistent with mild concentric hypertrophy.    Mild mitral valve stenosis is present. The mitral valve mean gradient is 6 mmHg.    Mild mitral regurgitation.    Trace tricuspid regurgitation.    Saline test results are negative for intracardiac shunting.    No cardiac source for embolus is seen.      Current medications:  Scheduled Meds:aspirin, 81 mg, Oral, Daily   Or  aspirin, 300 mg, Rectal, Daily  atorvastatin, 80 mg, Oral, Nightly  heparin (porcine), 5,000 Units, Subcutaneous, Q12H  insulin lispro, 2-9 Units, Subcutaneous, 4x Daily AC & at Bedtime  ipratropium-albuterol, 3 mL, Nebulization, 4x Daily - RT  labetalol, 10 mg, Intravenous, Once  metoprolol succinate XL, 25 mg, Oral, Q24H  sacubitril-valsartan, 1 tablet, Oral, Q12H  sodium chloride, 10 mL, Intravenous, Q12H  sodium chloride, 10 mL, Intravenous, Q12H  ticagrelor, 90 mg, Oral, BID      Continuous Infusions:   PRN Meds:.  acetaminophen    senna-docusate sodium **AND** polyethylene glycol **AND**  bisacodyl **AND** bisacodyl    Calcium Replacement - Follow Nurse / BPA Driven Protocol    dextrose    dextrose    glucagon (human recombinant)    Magnesium Standard Dose Replacement - Follow Nurse / BPA Driven Protocol    melatonin    ondansetron    Potassium Replacement - Follow Nurse / BPA Driven Protocol    sodium chloride    sodium chloride    sodium chloride    sodium chloride    Assessment & Plan   Assessment & Plan     Active Hospital Problems    Diagnosis  POA    **Stroke-like symptoms [R29.90]  Yes      Resolved Hospital Problems   No resolved problems to display.        Roselia Rossi is a 64-year-old female with a PMH significant for right hemispheric watershed strokes (2023, residual left hand weakness), uncontrolled HTN, uncontrolled HLD, CAD, CABG, CHF, uncontrolled T2DM, CKD, and thyroid disease who presents to [facility] via EMS transfer from [facility] with complaints of multifocal right hemispheric infarcts.  Patient initially presented to OSH on 1/14/2025 with complaints of left-sided weakness.  She underwent an MRI on 1/14 that revealed multifocal right hemispheric areas of restricted diffusion.  She was started on DAPT with aspirin and Plavix as well as high-dose statin.  On 1/16/2025, she was noted to be more lethargic and generally weak.  She was unable to undergo repeat MRI and MRA secondary to agitation.  Repeat CT head per OSH MD with evolving infarcts and no hemorrhage.      Acute multifocal CVA  HTN  -Stroke team following, continue DAPT+statin. D/W Dr. Young. Feels patient has significant receptive aphasia and does not have capacity at this time. Has d/w her daughter but apparently they are somewhat estranged and hadn't spoken in months.   -Continue slow adjustments to BP mgmt.  -PT/OT/SLP. Will need rehab.     A/C HFpEF   Hypoxia  -CXR on arrival with pulmonary edema. Echo w/ hfpef.  -Resume entresto, metoprolol.  -Rec'd IV lasix x 2. Seems euvolemic now.      Hyperglycemia w/  DMII  -SSI     Hx of hypothyroidism:   Will continue home medications     Expected Discharge Location and Transportation:   Expected Discharge   Expected discharge date/ time has not been documented.     VTE Prophylaxis:  Pharmacologic & mechanical VTE prophylaxis orders are present.         AM-PAC 6 Clicks Score (PT): 10 (01/19/25 1205)    CODE STATUS:   Code Status and Medical Interventions: CPR (Attempt to Resuscitate); Full Support   Ordered at: 01/17/25 0287     Level Of Support Discussed With:    Patient     Code Status (Patient has no pulse and is not breathing):    CPR (Attempt to Resuscitate)     Medical Interventions (Patient has pulse or is breathing):    Full Support       Deb Chatman II, DO  01/20/25

## 2025-01-20 NOTE — CONSULTS
"Diabetes Education  Assessment/Teaching    Patient Name:  Roselia Rossi  YOB: 1960  MRN: [id number]  Admit Date:  1/17/2025      Assessment Date:  1/20/2025  Flowsheet Row Most Recent Value   General Information     Referral From: Other (comment)  [per stroke protocol]   Height 165.1 cm (65\")   Weight 96.2 kg (212 lb)   Do you have high blood pressure? yes   Are you currently being treated for high blood pressure? yes   How would you rate your current health? poor   Pregnancy Assessment    Diabetes History    What type of diabetes do you have? Type 2   Length of Diabetes Diagnosis 10 + years   Current DM knowledge poor   Have you had diabetes education/teaching in the past? yes   Do you test your blood sugar at home? yes   Frequency of checks once or twice a day   Meter type glucometer   Who performs the test? self   Typical readings 400   Have you had low blood sugar? (<70mg/dl) no   Have you had high blood sugar? (>140mg/dl) yes   How often do you have high blood sugar? frequently   How would you rate your diabetes control? poor   Do you have any diabetes complications? heart disease, nephropathy   Education Preferences    What areas of diabetes would you like to learn about? avoiding high blood sugar, avoiding low blood sugar, diabetes complications, diet information, exercise information, medications for diabetes, stress/coping skills, testing my blood sugar at home, understanding diabetes, resources on diabetes   Barriers to Learning hearing loss, changes in sensation   Nutrition Information    Assessment Topics    Healthy Eating - Assessment Needs education   Being Active - Assessment Needs education   Taking Medication - Assessment Needs education   Problem Solving - Assessment Needs education   Reducing Risk - Assessment Needs education   Healthy Coping - Assessment Needs education   Monitoring - Assessment Needs education   DM Goals             Flowsheet Row Most Recent Value   DM " Education Needs    Meter Has own   Frequency of Testing 2 times a day   Blood Glucose Target Range per ada guidelines   Medication Oral, Insulin   Problem Solving Hypoglycemia, Hyperglycemia, Signs, Symptoms, Treatment   Reducing Risks Blood pressure, A1C testing, Cardiovascular   Healthy Eating Reviewed meal plan   Physical Activity Frequency Discussed exercise importance   Healthy Coping Frustrated   Motivation Not interested   Teaching Method Explanation, Discussion, Handouts   Patient Response Needs reinforcement          Total time spent reviewing chart, preparing education/materials, providing education at bedside, and coordinating care approx 30 minutes.    Consult for diabetes education received per stroke protocol. Chart reviewed. Ms. Rossi was seen at bedside today. Permission given for visit.     Discussed and reviewed concepts about type 2 diabetes self-management, risk factors, and importance of blood glucose control to reduce complications. Target blood glucose readings and A1c goals per ADA were reviewed. Reviewed with patient current A1c 12 and discussed its significance. Goal of A1c less than 7 encouraged, risks of stroke and heart attack reviewed. Signs, symptoms, and treatment of hyperglycemia and hypoglycemia were discussed.     Patient's home medications include: metformin, farxiga, insulin protamine. Patient stated that she has been occasionally checking blood glucose once in morning and once at night after washing Dexcom Sensor. Patient reported A1c had been down to 8 while on the Dexcom. Asked patient if she had spoken to Dexcom representative, Dexcom stated replacement would be available for malfunction, not for damage. Encouraged patient to follow up with endocrinology.    Lifestyle changes such as physical activity with MD approval and healthy eating were encouraged. Patient listed typical meals included McMuffin for breakfast, McRib sandwich meals. Patient stated she had made changes  "from Diet Coke to Coke Zero in attempt to control sugar. Attempted to educate patient about carbohydrate impact on blood glucose, patient stated she had previously had a whole plate of [employer] pasta with no impact on blood sugar. Encouraged pt to monitor blood sugar at home 4 times per day and to call PCP if blood sugar is trending high, patient reported fasting blood sugars greater than 400. Encouraged to keep record of blood glucose readings to take to follow up appointment with PCP. Provided patient with copy of [employer]'s \"What is Diabetes\" handout, \"Blood Glucose Goals\" handout, \"What is A1c\" handout, and [employer]'s \"A Balanced Plate\" handout.     Thank you for this consult.     Patient does not qualify for the follow up stroke class based on the exclusion criteria of planned discharge to inpatient rehab.        Electronically signed by:  Ashlee Clarke RN  01/20/25 15:31 EST  "

## 2025-01-20 NOTE — PROGRESS NOTES
Stroke Neurology Progress Note     Subjective     This patient was seen in follow-up for: acute right MCA territory infarct  Present for the encounter were: self, patient     Subjective:  I saw and examined the patient at bedside.  No acute events overnight.  Patient clinical exam is somewhat improved this morning.  She is oriented to person, place, time, and situation this morning.  She falls asleep on exam.  Still demonstrates mixed aphasia as she follows intermittent commands and is nonfluent with her speech.  I will call her daughter today to update on plan of care.    Objective      Temp:  [97.4 °F (36.3 °C)-98 °F (36.7 °C)] 98 °F (36.7 °C)  Heart Rate:  [64-72] 72  Resp:  [18] 18  BP: (157-179)/() 157/94            Objective    Physical Exam:  General Appearance: Lethargic  HEENT: anicteric sclera, no scleral injection  Lungs: respirations appear comfortable, no obvious increased work of breathing  Extremities: No cyanosis or fingernail clubbing   Skin: No rashes in exposed skin areas     Neurological Examination:   Mental status: Lethargic.  Oriented to person, place, time, and situation this morning.  Mild dysarthria. Mixed aphasia.  Follows intermittent commands, nonfluent speech.  Cranial Nerves: Visual fields intact. Extraocular movements intact with no nystagmus.  Hard of hearing.  Right gaze deviation.  Left facial droop.  Sensory: Normal sensory exam to light touch.  Motor: Normal tone.   Strength:  LUE: 0/5 biceps, triceps,   LLE: 1/5 hip flexion/extension  RUE: 5/5 biceps, triceps,   RLE:  4+/5 hip flexion/extension      Labs:    Lab Results   Component Value Date    HGBA1C 12.00 (H) 01/18/2025      Lab Results   Component Value Date    CHOL 133 01/18/2025    TRIG 129 01/18/2025    HDL 34 (L) 01/18/2025    LDL 76 01/18/2025       Lab Results   Component Value Date    WBC 7.95 01/20/2025    HGB 15.3 01/20/2025    HCT 46.5 01/20/2025    MCV 88.9 01/20/2025     01/20/2025     Lab  Results   Component Value Date    GLUCOSE 152 (H) 01/20/2025    BUN 15 01/20/2025    CREATININE 1.09 (H) 01/20/2025    EGFRIFNONA 63 09/02/2021    BCR 13.8 01/20/2025    CO2 24.0 01/20/2025    CALCIUM 9.0 01/20/2025    ALBUMIN 2.1 (L) 01/20/2025    AST 19 01/20/2025    ALT 11 01/20/2025       Results from last 7 days   Lab Units 01/20/25  1053 01/19/25  0442 01/18/25  0416   SODIUM mmol/L 142 143 135*   POTASSIUM mmol/L 3.9 4.1 4.5   CHLORIDE mmol/L 107 108* 104   CO2 mmol/L 24.0 24.0 22.0   BUN mg/dL 15 16 20   CREATININE mg/dL 1.09* 1.21* 1.47*   CALCIUM mg/dL 9.0 9.2 8.9   BILIRUBIN mg/dL 0.3 0.3 0.2   ALK PHOS U/L 107 113 101   ALT (SGPT) U/L 11 13 14   AST (SGOT) U/L 19 16 15   GLUCOSE mg/dL 152* 200* 168*         Results Review:      All brain images and reports were personally reviewed and I agree with the interpretations except as noted below.    MRI Brain Without Contrast 1/18/2025  Impression: Areas of acute infarct are present within the right MCA territory as above, with some mild early edema including overlying sulcal effacement, otherwise without evidence of midline shift or significant component of hemorrhage.         Transthoracic echocardiogram 1/19/2025  Impression:    Left ventricular systolic function is normal. Estimated left ventricular EF = 55%    Left ventricular wall thickness is consistent with mild concentric hypertrophy.    Mild mitral valve stenosis is present. The mitral valve mean gradient is 6 mmHg.    Mild mitral regurgitation.    Trace tricuspid regurgitation.    Saline test results are negative for intracardiac shunting.    No cardiac source for embolus is seen.    Bilateral lower extremity ultrasound 1/18/2025  Impression: No evidence of deep or superficial venous thrombus in the right or left lower extremities.    Right upper extremity ultrasound 1/18/2025  Impression: No evidence of deep or superficial venous thrombus in the right upper extremity    Carotid ultrasound performed  at outside hospital less than 50% stenoses bilaterally    EEG 1/20/2025  Impression: Diffuse cerebral dysfunction mild degree, nonspecific.  No evidence for epilepsy is seen on the study.        Assessment/Plan     Assessment:    Roselia Rossi is a 64-year-old left-handed female with a past medical history of right hemispheric watershed strokes (2023, residual left hand weakness), hypertension, hyperlipidemia, CAD, CABG, CHF, DMT2 (hemoglobin A1c 12%), CKD, thyroid disease who presented to [facility] on 1/14/2025 with left hemiparesis.  There, MRI 1/14/2025 revealed multifocal right hemispheric infarcts.  She was started aspirin, clopidogrel, and atorvastatin.  On 1/16/2025 she was noted to be more lethargic and weak.  She has been unable to undergo repeat MRI and MRA even with mild sedation.  She was subsequently transferred to [facility] for higher level of care.    # Acute right hemispheric infarct dominant hemisphere   I suspect cardioembolic etiology based on location of infarct and large stroke.  No known history of atrial fibrillation.    -Patient was found to be a clopidogrel nonresponder with P2Y12 level 218  -Increased Brilinta to 90 mg twice daily   -Continue aspirin 81 mg daily  -Continue atorvastatin 80 mg daily LDL 76  -Monitor telemetry for atrial fibrillation  -If no atrial fibrillation is found on telemetry, patient will need a cardiac monitor prior to discharge  -PT OT evaluated the patient and recommend IRF  -Patient may need formal capacity evaluation   -Neuro-checks per unit protocol while inpatient  -Blood pressure goal: SBP <180  -DVT prophylaxis: SCD, heparin  -Follow-up in stroke clinic     I updated the patient's daughter Rhina on plan of care as above on 1/19. No answer 1/20.      Patient education: call 911 or present to emergency department with any stroke symptom, including unilateral face, arm, or leg weakness, numbness, or paresthesias, unilateral facial  droop, speech deficits, dizziness with nausea, vomiting, nystagmus, and incoordination, visual deficits, or severe onset headache.    The patient is cleared to discharge to IRF.  Please call with questions.     Maria Isabel Young MD  [facility]  01/20/25  12:20 EST

## 2025-01-20 NOTE — CASE MANAGEMENT/SOCIAL WORK
Continued Stay Note   [place]     Patient Name: Roselia Rossi  MRN: [id number]  Today's Date: 1/20/2025    Admit Date: 1/17/2025    Plan: [facility]   Discharge Plan       Row Name 01/20/25 1541       Plan    Plan [facility]    Patient/Family in Agreement with Plan yes    Plan Comments Spoke with Ms. Rossi at the bedside. Therapy is recommending rehab. Discussed with patient and she is agreeable to [facility] since she has been there before. Once she receives a bed offer, she will need insurance approval from her [place] insurance for the rehab. CM will continue to follow up.    Final Discharge Disposition Code 62 - inpatient rehab facility                   Discharge Codes    No documentation.                       Dannielle Samuel RN

## 2025-01-20 NOTE — PROGRESS NOTES
Nutrition Services    Patient Name:  Roselia Rossi  YOB: 1960  MRN: [id number]  Admit Date:  1/17/2025    Pt screened per protocol for potential pressure injury. WOC consult pending. RD will complete nutrition assessment with identification of Stg II PI or greater. Will continue to monitor per protocol. Please consult with urgent nutrition related needs. Thanks.      Electronically signed by:  Sammi Hurt MS,REA,AISLINN  01/20/25 12:33 EST

## 2025-01-20 NOTE — NURSING NOTE
Woc consulted for pressure injury to left gluteal.    Patient presents with either an evolving deep tissue injury that is almost to the stage II point I could not tell if there was yellow slough however this could just be membranous tissue and this could just be a stage II.    This is POA.    Will order Venelex and pink paste pink paste left in the room discussed with nurse will order specialty bed.    Woc to follow while inpatient.

## 2025-01-21 LAB
GLUCOSE BLDC GLUCOMTR-MCNC: 135 MG/DL (ref 70–130)
GLUCOSE BLDC GLUCOMTR-MCNC: 163 MG/DL (ref 70–130)
GLUCOSE BLDC GLUCOMTR-MCNC: 182 MG/DL (ref 70–130)
GLUCOSE BLDC GLUCOMTR-MCNC: 223 MG/DL (ref 70–130)

## 2025-01-21 PROCEDURE — 92507 TX SP LANG VOICE COMM INDIV: CPT

## 2025-01-21 PROCEDURE — 97112 NEUROMUSCULAR REEDUCATION: CPT

## 2025-01-21 PROCEDURE — 94799 UNLISTED PULMONARY SVC/PX: CPT

## 2025-01-21 PROCEDURE — 97530 THERAPEUTIC ACTIVITIES: CPT

## 2025-01-21 PROCEDURE — 99232 SBSQ HOSP IP/OBS MODERATE 35: CPT

## 2025-01-21 PROCEDURE — 99232 SBSQ HOSP IP/OBS MODERATE 35: CPT | Performed by: HOSPITALIST

## 2025-01-21 PROCEDURE — 94664 DEMO&/EVAL PT USE INHALER: CPT

## 2025-01-21 PROCEDURE — 82948 REAGENT STRIP/BLOOD GLUCOSE: CPT

## 2025-01-21 PROCEDURE — 63710000001 INSULIN LISPRO (HUMAN) PER 5 UNITS: Performed by: FAMILY MEDICINE

## 2025-01-21 PROCEDURE — 97535 SELF CARE MNGMENT TRAINING: CPT

## 2025-01-21 PROCEDURE — 25010000002 HEPARIN (PORCINE) PER 1000 UNITS: Performed by: STUDENT IN AN ORGANIZED HEALTH CARE EDUCATION/TRAINING PROGRAM

## 2025-01-21 PROCEDURE — 97110 THERAPEUTIC EXERCISES: CPT

## 2025-01-21 PROCEDURE — 92526 ORAL FUNCTION THERAPY: CPT

## 2025-01-21 RX ORDER — POLYVINYL ALCOHOL 14 MG/ML
2 SOLUTION/ DROPS OPHTHALMIC
Status: DISCONTINUED | OUTPATIENT
Start: 2025-01-21 | End: 2025-01-24 | Stop reason: HOSPADM

## 2025-01-21 RX ADMIN — ASPIRIN 81 MG CHEWABLE TABLET 81 MG: 81 TABLET CHEWABLE at 08:35

## 2025-01-21 RX ADMIN — SACUBITRIL AND VALSARTAN 1 TABLET: 49; 51 TABLET, FILM COATED ORAL at 08:35

## 2025-01-21 RX ADMIN — HEPARIN SODIUM 5000 UNITS: 5000 INJECTION INTRAVENOUS; SUBCUTANEOUS at 20:26

## 2025-01-21 RX ADMIN — ATORVASTATIN CALCIUM 80 MG: 40 TABLET, FILM COATED ORAL at 20:27

## 2025-01-21 RX ADMIN — IPRATROPIUM BROMIDE AND ALBUTEROL SULFATE 3 ML: 2.5; .5 SOLUTION RESPIRATORY (INHALATION) at 12:55

## 2025-01-21 RX ADMIN — INSULIN LISPRO 4 UNITS: 100 INJECTION, SOLUTION INTRAVENOUS; SUBCUTANEOUS at 20:26

## 2025-01-21 RX ADMIN — HEPARIN SODIUM 5000 UNITS: 5000 INJECTION INTRAVENOUS; SUBCUTANEOUS at 08:35

## 2025-01-21 RX ADMIN — SACUBITRIL AND VALSARTAN 1 TABLET: 49; 51 TABLET, FILM COATED ORAL at 20:26

## 2025-01-21 RX ADMIN — TICAGRELOR 90 MG: 90 TABLET ORAL at 08:35

## 2025-01-21 RX ADMIN — Medication 1 APPLICATION: at 08:38

## 2025-01-21 RX ADMIN — INSULIN LISPRO 2 UNITS: 100 INJECTION, SOLUTION INTRAVENOUS; SUBCUTANEOUS at 12:18

## 2025-01-21 RX ADMIN — TICAGRELOR 90 MG: 90 TABLET ORAL at 20:26

## 2025-01-21 RX ADMIN — POLYVINYL ALCOHOL 2 DROP: 1.4 SOLUTION/ DROPS OPHTHALMIC at 16:15

## 2025-01-21 RX ADMIN — METOPROLOL SUCCINATE 25 MG: 25 TABLET, EXTENDED RELEASE ORAL at 08:35

## 2025-01-21 RX ADMIN — INSULIN LISPRO 2 UNITS: 100 INJECTION, SOLUTION INTRAVENOUS; SUBCUTANEOUS at 16:54

## 2025-01-21 NOTE — THERAPY TREATMENT NOTE
Patient Name: Roselia Rossi  : 1960    MRN: [id number]                              Today's Date: 2025       Admit Date: 2025    Visit Dx:     ICD-10-CM ICD-9-CM   1. Cognitive communication deficit  R41.841 799.52   2. Dysphagia, unspecified type  R13.10 787.20   3. Palpitations  R00.2 785.1     Patient Active Problem List   Diagnosis    Abnormal stress test    Type 2 diabetes mellitus with hyperglycemia    Stroke-like symptoms     Past Medical History:   Diagnosis Date    Coronary artery disease     Diabetes mellitus     Disease of thyroid gland     Hyperlipidemia     Hypertension      Past Surgical History:   Procedure Laterality Date    CARDIAC CATHETERIZATION N/A 2017    Procedure: Left Heart Cath;  Surgeon: Tommy Haider MD;  Location:  [facility];  Service:     CARDIAC CATHETERIZATION N/A 2021    Procedure: Left Heart Cath;  Surgeon: Tommy Haider MD;  Location:  [facility];  Service: Cardiovascular;  Laterality: N/A;    CHOLECYSTECTOMY      COLONOSCOPY      CORONARY ARTERY BYPASS GRAFT      TUBAL ABDOMINAL LIGATION        General Information       Row Name 25 0949          OT Time and Intention    Document Type therapy note (daily note)  -CS     Mode of Treatment occupational therapy  -CS     Patient Effort good  -CS       Row Name 25 0949          General Information    Existing Precautions/Restrictions fall;other (see comments)  L-sided weakness (UE > LE), LUE sling issued for mobility, [place] (aids in room)  -CS     Barriers to Rehab medically complex;previous functional deficit;hearing deficit  -CS       Row Name 25 0949          Cognition    Orientation Status (Cognition) oriented x 3  -CS       Row Name 25 0949          Safety Issues/Impairments Affecting Functional Mobility    Safety Issues Affecting Function (Mobility) insight into deficits/self-awareness;safety precaution awareness  -CS      Impairments Affecting Function (Mobility) balance;coordination;endurance/activity tolerance;cognition;grasp;motor control;motor planning;muscle tone abnormal;strength;sensation/sensory awareness;postural/trunk control  -CS     Cognitive Impairments, Mobility Safety/Performance insight into deficits/self-awareness;problem-solving/reasoning;sequencing abilities;safety precaution follow-through;safety precaution awareness;judgment  -CS     Comment, Safety Issues/Impairments (Mobility) L lateral lean with RUE pushing  -CS               User Key  (r) = Recorded By, (t) = Taken By, (c) = Cosigned By      Initials Name Provider Type    CS Bryan Powell OT Occupational Therapist                     Mobility/ADL's       Row Name 01/21/25 0951          Bed Mobility    Bed Mobility supine-sit;scooting/bridging  -     Supine-Sit [place] (Bed Mobility) verbal cues;nonverbal cues (demo/gesture);moderate assist (50% patient effort);2 person assist  -     Sit-Supine [place] (Bed Mobility) maximum assist (25% patient effort);1 person assist;nonverbal cues (demo/gesture);verbal cues  -     Assistive Device (Bed Mobility) bed rails;head of bed elevated;repositioning sheet  -CS     Comment, (Bed Mobility) educated on L assist RLE to EOB, increased assist at trunk, L lateral lean with pushing upon sitting, no dizziness reported  -CS       Row Name 01/21/25 0951          Transfers    Transfers sit-stand transfer;bed-chair transfer  -CS     Comment, (Transfers) SPT to recliner and STS x 2  -       Row Name 01/21/25 0951          Bed-Chair Transfer    Bed-Chair [place] (Transfers) maximum assist (25% patient effort);2 person assist;verbal cues;nonverbal cues (demo/gesture)  -     Assistive Device (Bed-Chair Transfers) other (see comments)  -CS       Row Name 01/21/25 0951          Sit-Stand Transfer    Sit-Stand [place] (Transfers) maximum assist (25% patient effort);2 person assist;verbal cues;nonverbal  cues (demo/gesture)  -     Assistive Device (Sit-Stand Transfers) walker, front-wheeled  -       Row Name 01/21/25 0951          Functional Mobility    Functional Mobility- Ind. Level unable to perform  -CS     Patient was able to Ambulate no, other medical factors prevent ambulation  -       Row Name 01/21/25 0951          Grooming Assessment/Training    [place] Level (Grooming) hair care, combing/brushing;wash face, hands;moderate assist (50% patient effort)  -CS     Comment, (Grooming) cues for attention to completion/quality of LUE and L head  -       Row Name 01/21/25 0951          Self-Feeding Assessment/Training    [place] Level (Feeding) prepare tray/open items;maximum assist (25% patient effort);liquids to mouth;scoop food and bring to mouth;independent  -CS     Position (Feeding) supported sitting  -               User Key  (r) = Recorded By, (t) = Taken By, (c) = Cosigned By      Initials Name Provider Type     Bryan Powell, OT Occupational Therapist                   Obj/Interventions       [facility] Name 01/21/25 0953          Sensory Interventions    Desensitization Techniques/Interventions (Sensation) massage;occupation/activity based interventions;percussion;rubbing with textures;tapping;other (see comments)  -CS     Comment, Sensory Intervention Pt educated on sensory reeducation concepts with focus on varying textures, massage, and tapping techniques  -       Row Name 01/21/25 0953          Shoulder (Therapeutic Exercise)    Shoulder (Therapeutic Exercise) AAROM (active assistive range of motion)  -     Shoulder AAROM (Therapeutic Exercise) right assist left;bilateral;flexion;extension;external rotation;internal rotation;5 repetitions  -       Row Name 01/21/25 0953          Elbow/Forearm (Therapeutic Exercise)    Elbow/Forearm (Therapeutic Exercise) AAROM (active assistive range of motion)  -     Elbow/Forearm AAROM (Therapeutic Exercise) right assist  left;bilateral;flexion;extension;5 repetitions  -       Row Name 01/21/25 0953          Wrist (Therapeutic Exercise)    Wrist (Therapeutic Exercise) PROM (passive range of motion)  -     Wrist PROM (Therapeutic Exercise) left;extension;flexion  -       Row Name 01/21/25 0953          Hand (Therapeutic Exercise)    Hand (Therapeutic Exercise) PROM (passive range of motion)  -     Hand PROM (Therapeutic Exercise) left;finger extension;finger flexion;finger aDduction;finger aBduction;intrinsic stretch;extrinsic stretch;thumb extension;thumb flexion;thumb opposition;thumb palmar aBduction;thumb web space stretch;3 repetitions  -       Row Name 01/21/25 0953          Motor Skills    Motor Skills motor control/coordination interventions;muscle tone;functional endurance;coordination  -     Coordination left;upper extremity;finger to nose;bimanual skills;severe impairment  -     Functional Endurance easily fatigues, stable on RA  -     Muscle Tone left;hypotonia;severe impairment  -     Motor Control/Coordination Interventions occupation/activity based treatment;therapeutic exercise/ROM  -     Therapeutic Exercise shoulder;elbow/forearm;wrist;hand  -       Row Name 01/21/25 0953          Balance    Balance Assessment sitting static balance;sitting dynamic balance;standing static balance;standing dynamic balance  -     Static Sitting Balance 1-person assist;non-verbal cues (demo/gesture);verbal cues;moderate assist;minimal assist  -     Dynamic Sitting Balance moderate assist;2-person assist;non-verbal cues (demo/gesture);verbal cues  -     Position, Sitting Balance supported;sitting edge of bed  -     Static Standing Balance 2-person assist;maximum assist;non-verbal cues (demo/gesture);verbal cues  -     Dynamic Standing Balance maximum assist;verbal cues;non-verbal cues (demo/gesture)  -     Balance Interventions sitting;sit to stand;standing;trunk training exercise;weight shifting  activity;occupation based/functional task  -CS     Comment, Balance progressed to intermittent CGA/[place] for sitting balance EOB following lateral weight shifting, RUE weight bearing, and mnidline orientaion acitivites  -               User Key  (r) = Recorded By, (t) = Taken By, (c) = Cosigned By      Initials Name Provider Type    CS Bryan Powell, OT Occupational Therapist                   Goals/Plan    No documentation.                  Clinical Impression       Row Name 01/21/25 0957          Pain Assessment    Pretreatment Pain Rating 0/10 - no pain  -CS     Posttreatment Pain Rating 0/10 - no pain  -CS     Additional Documentation Pain Scale: FACES Pre/Post-Treatment (Group)  -CS       Row Name 01/21/25 0957          Pain Scale: FACES Pre/Post-Treatment    Pain: FACES Scale, Pretreatment 0-->no hurt  -CS     Posttreatment Pain Rating 0-->no hurt  -CS       Row Name 01/21/25 0957          Plan of Care Review    Plan of Care Reviewed With patient  -CS     Progress improving  -CS     Outcome Evaluation Pt engaged in sitting balance activities, transfer training, and seated self-care this session. Progressed to intermittent CGA for sitting balance, remains Max/Dep for LB ADLs. Educated on simple LUE sensory interventions encompassing textures, massage, and introducing self-assist ROM. Will cont IPOT per POC as tolerated. Rec d/c to IRF.  -       Row Name 01/21/25 0957          Therapy Plan Review/Discharge Plan (OT)    Anticipated Discharge Disposition (OT) inpatient rehabilitation facility  -       Row Name 01/21/25 0957          Vital Signs    Pre Systolic BP Rehab 137  RN cleared for tx  -CS     Pre Treatment Diastolic BP 92  -CS     O2 Delivery Pre Treatment room air  -CS     O2 Delivery Intra Treatment room air  -CS     O2 Delivery Post Treatment room air  -CS     Pre Patient Position Supine  -CS     Intra Patient Position Standing  -CS     Post Patient Position Sitting  -CS       Row Name 01/21/25  0957          Positioning and Restraints    Pre-Treatment Position in bed  -CS     Post Treatment Position chair  -CS     In Chair notified nsg;reclined;sitting;call light within reach;encouraged to call for assist;exit alarm on;waffle cushion;on mechanical lift sling;heels elevated;legs elevated;RUE elevated;LUE elevated  -               User Key  (r) = Recorded By, (t) = Taken By, (c) = Cosigned By      Initials Name Provider Type    Bryan Mancilla OT Occupational Therapist                   Outcome Measures       Row Name 01/21/25 1000          How much help from another is currently needed...    Putting on and taking off regular lower body clothing? 2  -CS     Bathing (including washing, rinsing, and drying) 2  -CS     Toileting (which includes using toilet bed pan or urinal) 1  -CS     Putting on and taking off regular upper body clothing 2  -CS     Taking care of personal grooming (such as brushing teeth) 2  -CS     Eating meals 2  -CS     AM-PAC 6 Clicks Score (OT) 11  -CS       Row Name 01/21/25 1000          Modified [place] Scale    Modified [place] Scale 4 - Moderately severe disability.  Unable to walk without assistance, and unable to attend to own bodily needs without assistance.  -CS       Row Name 01/21/25 1000          Functional Assessment    Outcome Measure Options AM-PAC 6 Clicks Daily Activity (OT)  -CS               User Key  (r) = Recorded By, (t) = Taken By, (c) = Cosigned By      Initials Name Provider Type    Bryan Mancilla OT Occupational Therapist                    Occupational Therapy Education       Title: PT OT SLP Therapies (In Progress)       Topic: Occupational Therapy (Done)       Point: ADL training (Done)       Description:   Instruct learner(s) on proper safety adaptation and remediation techniques during self care or transfers.   Instruct in proper use of assistive devices.                  Learning Progress Summary            Patient Acceptance, D,E, VU,DU by   at 1/21/2025 1001    Acceptance, E, VU by  at 1/19/2025 1051                      Point: Home exercise program (Done)       Description:   Instruct learner(s) on appropriate technique for monitoring, assisting and/or progressing therapeutic exercises/activities.                  Learning Progress Summary            Patient Acceptance, D,E, VU,DU by  at 1/21/2025 1001                      Point: Precautions (Done)       Description:   Instruct learner(s) on prescribed precautions during self-care and functional transfers.                  Learning Progress Summary            Patient Acceptance, D,E, VU,DU by  at 1/21/2025 1001    Acceptance, E, VU by  at 1/19/2025 1051                      Point: Body mechanics (Done)       Description:   Instruct learner(s) on proper positioning and spine alignment during self-care, functional mobility activities and/or exercises.                  Learning Progress Summary            Patient Acceptance, D,E, VU,DU by  at 1/21/2025 1001    Acceptance, E, VU by  at 1/19/2025 1051                                      User Key       Initials Effective Dates Name Provider Type Discipline     06/16/21 -  Betsy Guardado OT Occupational Therapist OT     06/16/21 -  Bryan Powell OT Occupational Therapist OT                  OT Recommendation and Plan     Plan of Care Review  Plan of Care Reviewed With: patient  Progress: improving  Outcome Evaluation: Pt engaged in sitting balance activities, transfer training, and seated self-care this session. Progressed to intermittent CGA for sitting balance, remains Max/Dep for LB ADLs. Educated on simple LUE sensory interventions encompassing textures, massage, and introducing self-assist ROM. Will cont IPOT per POC as tolerated. Rec d/c to IRF.     Time Calculation:         Time Calculation- OT       Row Name 01/21/25 1001             Time Calculation- OT    OT Start Time 0736  -      OT Received On 01/21/25  -      OT Goal  Re-Cert Due Date 01/29/25  -CS         Timed Charges    28684 - OT Therapeutic Exercise Minutes 12  -CS      94865 - OT Therapeutic Activity Minutes 16  -CS      56420 - OT Self Care/Mgmt Minutes 10  -CS         Total Minutes    Timed Charges Total Minutes 38  -CS       Total Minutes 38  -CS                User Key  (r) = Recorded By, (t) = Taken By, (c) = Cosigned By      Initials Name Provider Type    CS Bryan Powell, OT Occupational Therapist                  Therapy Charges for Today       Code Description Service Date Service Provider Modifiers Qty    56418275940 HC OT THER PROC EA 15 MIN 1/21/2025 Bryan Powlel, OT GO 1    61668119349 HC OT THERAPEUTIC ACT EA 15 MIN 1/21/2025 Bryan Powell OT GO 1    30087666467 HC OT SELF CARE/MGMT/TRAIN EA 15 MIN 1/21/2025 Bryan Powell OT GO 1                 Bryan Powell OT  1/21/2025

## 2025-01-21 NOTE — PLAN OF CARE
Goal Outcome Evaluation:                   Anticipated Discharge Disposition (SLP): inpatient rehabilitation facility

## 2025-01-21 NOTE — PLAN OF CARE
Problem: Adult Inpatient Plan of Care  Goal: Plan of Care Review  Outcome: Progressing  Goal: Patient-Specific Goal (Individualized)  Outcome: Progressing  Goal: Absence of Hospital-Acquired Illness or Injury  Outcome: Progressing  Intervention: Identify and Manage Fall Risk  Intervention: Prevent Skin Injury  Intervention: Prevent Infection  Goal: Optimal Comfort and Wellbeing  Outcome: Progressing  Intervention: Provide Person-Centered Care  Goal: Readiness for Transition of Care  Outcome: Progressing     Problem: Skin Injury Risk Increased  Goal: Skin Health and Integrity  Outcome: Progressing  Intervention: Optimize Skin Protection     Problem: Suicide Risk  Goal: Absence of Self-Harm  Outcome: Progressing  Intervention: Assess Risk to Self and Maintain Safety  Intervention: Promote Psychosocial Wellbeing     Problem: Fall Injury Risk  Goal: Absence of Fall and Fall-Related Injury  Outcome: Progressing  Intervention: Identify and Manage Contributors  Intervention: Promote Injury-Free Environment   Goal Outcome Evaluation:  Plan of Care Reviewed With: patient

## 2025-01-21 NOTE — PLAN OF CARE
Goal Outcome Evaluation:  Plan of Care Reviewed With: patient        Progress: improving  Outcome Evaluation: Pt engaged in sitting balance activities, transfer training, and seated self-care this session. Progressed to intermittent CGA for sitting balance, remains Max/Dep for LB ADLs. Educated on simple LUE sensory interventions encompassing textures, massage, and introducing self-assist ROM. Will cont IPOT per POC as tolerated. Rec d/c to IRF.    Anticipated Discharge Disposition (OT): inpatient rehabilitation facility

## 2025-01-21 NOTE — PLAN OF CARE
Goal Outcome Evaluation:  Plan of Care Reviewed With: patient           Outcome Evaluation: Pt participated in sitting balance on EOB focusing on midline orientation and foot/hand positioning for optimal alignment; she had instances of CGA for balance but mostly required mod<>max to maintain mildine positioning. 2 standing trials at EOB where she demo'd poor postural awareness and L leaning; gait deferred. PT will continue to follow while admitted.    Anticipated Discharge Disposition (PT): inpatient rehabilitation facility

## 2025-01-21 NOTE — PROGRESS NOTES
Stroke Neurology Progress Note     Subjective     This patient was seen in follow-up for: acute right MCA territory infarct  Present for the encounter were: self, patient     Subjective:  Patient was working with physical therapy while I was at the bedside.  They were working on standing with assist x 2.  Patient still has significant weakness and left leg and left arm.  No acute events overnight.  Patient denies new or worsening stroke symptoms. Updated patient on POC at the bedside. I also called patient's daughter to update her. Answered all questions and concerns.    Objective      Temp:  [97.6 °F (36.4 °C)-98.5 °F (36.9 °C)] 97.9 °F (36.6 °C)  Heart Rate:  [60-75] 67  Resp:  [14-18] 18  BP: (137-164)/(83-96) 161/96        Objective    Physical Exam:  General Appearance: Lethargic  HEENT: anicteric sclera, no scleral injection  Lungs: respirations appear comfortable, no obvious increased work of breathing  Extremities: No cyanosis or fingernail clubbing   Skin: No rashes in exposed skin areas     Neurological Examination:   Mental status: Lethargic.  Oriented to person, place, time, and situation this morning.  Mild dysarthria. Mixed aphasia.  Follows intermittent commands, nonfluent speech.  Cranial Nerves: Visual fields intact. Extraocular movements intact with no nystagmus.  Hard of hearing.  Right gaze deviation.  Left facial droop.  Sensory: Normal sensory exam to light touch.  Motor: Normal tone.   Strength:  LUE: 0/5 biceps, triceps,   LLE: 1/5 hip flexion/extension  RUE: 5/5 biceps, triceps,   RLE:  4+/5 hip flexion/extension      Labs:    Lab Results   Component Value Date    HGBA1C 12.00 (H) 01/18/2025      Lab Results   Component Value Date    CHOL 133 01/18/2025    TRIG 129 01/18/2025    HDL 34 (L) 01/18/2025    LDL 76 01/18/2025       Lab Results   Component Value Date    WBC 7.95 01/20/2025    HGB 15.3 01/20/2025    HCT 46.5 01/20/2025    MCV 88.9 01/20/2025     01/20/2025     Lab  Results   Component Value Date    GLUCOSE 152 (H) 01/20/2025    BUN 15 01/20/2025    CREATININE 1.09 (H) 01/20/2025    EGFRIFNONA 63 09/02/2021    BCR 13.8 01/20/2025    CO2 24.0 01/20/2025    CALCIUM 9.0 01/20/2025    ALBUMIN 2.1 (L) 01/20/2025    AST 19 01/20/2025    ALT 11 01/20/2025       Results from last 7 days   Lab Units 01/20/25  1053 01/19/25  0442 01/18/25  0416   SODIUM mmol/L 142 143 135*   POTASSIUM mmol/L 3.9 4.1 4.5   CHLORIDE mmol/L 107 108* 104   CO2 mmol/L 24.0 24.0 22.0   BUN mg/dL 15 16 20   CREATININE mg/dL 1.09* 1.21* 1.47*   CALCIUM mg/dL 9.0 9.2 8.9   BILIRUBIN mg/dL 0.3 0.3 0.2   ALK PHOS U/L 107 113 101   ALT (SGPT) U/L 11 13 14   AST (SGOT) U/L 19 16 15   GLUCOSE mg/dL 152* 200* 168*         Results Review:      All brain images and reports were personally reviewed and I agree with the interpretations except as noted below.    MRI Brain Without Contrast 1/18/2025  Impression: Areas of acute infarct are present within the right MCA territory as above, with some mild early edema including overlying sulcal effacement, otherwise without evidence of midline shift or significant component of hemorrhage.         Transthoracic echocardiogram 1/19/2025  Impression:    Left ventricular systolic function is normal. Estimated left ventricular EF = 55%    Left ventricular wall thickness is consistent with mild concentric hypertrophy.    Mild mitral valve stenosis is present. The mitral valve mean gradient is 6 mmHg.    Mild mitral regurgitation.    Trace tricuspid regurgitation.    Saline test results are negative for intracardiac shunting.    No cardiac source for embolus is seen.    Bilateral lower extremity ultrasound 1/18/2025  Impression: No evidence of deep or superficial venous thrombus in the right or left lower extremities.    Right upper extremity ultrasound 1/18/2025  Impression: No evidence of deep or superficial venous thrombus in the right upper extremity    Carotid ultrasound performed  at outside hospital less than 50% stenoses bilaterally    EEG 1/20/2025  Impression: Diffuse cerebral dysfunction mild degree, nonspecific.  No evidence for epilepsy is seen on the study.        Assessment/Plan     Assessment:    Roselia Rossi is a 64-year-old left-handed female with a past medical history of right hemispheric watershed strokes (2023, residual left hand weakness), hypertension, hyperlipidemia, CAD, CABG, CHF, DMT2 (hemoglobin A1c 12%), CKD, thyroid disease who presented to [facility] on 1/14/2025 with left hemiparesis.  There, MRI 1/14/2025 revealed multifocal right hemispheric infarcts.  She was started aspirin, clopidogrel, and atorvastatin.  On 1/16/2025 she was noted to be more lethargic and weak.  She has been unable to undergo repeat MRI and MRA even with mild sedation.  She was subsequently transferred to [facility] for higher level of care.    # Acute right hemispheric infarct dominant hemisphere   I suspect cardioembolic etiology based on location of infarct and large stroke.  No known history of atrial fibrillation.    -Patient was found to be a clopidogrel nonresponder with P2Y12 level 218  -Increased Brilinta to 90 mg twice daily   -Continue aspirin 81 mg daily  -Continue atorvastatin 80 mg daily LDL 76  -Monitor telemetry for atrial fibrillation  -If no atrial fibrillation is found on telemetry, patient will need a cardiac monitor prior to discharge (14 days event monitor order placed)  -Plan is discharge to [facility] upon approval, if for some reason this changes please reach out to stroke at #[phone] as different arrangements will need to be made for cardiac monitor.  -PT OT evaluated the patient and recommend IRF  -Patient may need formal capacity evaluation   -Neuro-checks per unit protocol while inpatient  -Blood pressure goal: SBP <180  -DVT prophylaxis: SCD, heparin  -Follow-up in stroke clinic in 4-6 weeks (Added to ADT)          Discussed the importance of  medication compliance Aspirin 81mg daily, Brilinta 90mg twice daily, and Atorvastatin 80mg nightly and lifestyle modifications adequate control of blood pressure, adequate control of cholesterol (goal LDL <70), adequate control of glucose (<140, A1c goal <7), increased physical activity, and implementation of healthy diet to help reduce the risk of future cerebrovascular events.  Also discussed the signs symptoms that would warrant the patient return back to the emergency department including unilateral weakness, unilateral numbness, visual disturbances, loss of balance, speech difficulties, and/or a sudden severe headache.  Patient verbalized understanding.    Plan of care discussed with patient at the bedside and daughter via telephone.  Stroke neurology will sign off.  Patient is cleared to be discharged to inpatient rehab when approved and cleared by primary team.  If discharge disposition changes, please let stroke neurology know as different arrangements will have to be made for cardiac monitor.  Please call with any questions or concerns.    KETTY Harris  [facility] [place] STROKE NEURO  01/21/25  12:49 EST

## 2025-01-21 NOTE — THERAPY TREATMENT NOTE
Acute Care - Speech Language Pathology   Swallow Treatment Note [facility]     Patient Name: Roselia Rossi  : 1960  MRN: [id number]  Today's Date: 2025               Admit Date: 2025    Visit Dx:     ICD-10-CM ICD-9-CM   1. Cognitive communication deficit  R41.841 799.52   2. Dysphagia, unspecified type  R13.10 787.20   3. Palpitations  R00.2 785.1   4. Cryptogenic stroke  I63.9 434.91   5. Cerebrovascular accident (CVA), unspecified mechanism  I63.9 434.91     Patient Active Problem List   Diagnosis    Abnormal stress test    Type 2 diabetes mellitus with hyperglycemia    Stroke-like symptoms     Past Medical History:   Diagnosis Date    Coronary artery disease     Diabetes mellitus     Disease of thyroid gland     Hyperlipidemia     Hypertension      Past Surgical History:   Procedure Laterality Date    CARDIAC CATHETERIZATION N/A 2017    Procedure: Left Heart Cath;  Surgeon: Tommy Haider MD;  Location:  [facility];  Service:     CARDIAC CATHETERIZATION N/A 2021    Procedure: Left Heart Cath;  Surgeon: Tommy Haider MD;  Location:  [facility];  Service: Cardiovascular;  Laterality: N/A;    CHOLECYSTECTOMY      COLONOSCOPY      CORONARY ARTERY BYPASS GRAFT      TUBAL ABDOMINAL LIGATION         SLP Recommendation and Plan     SLP Diet Recommendation: soft to chew textures, chopped, thin liquids (25 1440)  Recommended Precautions and Strategies: upright posture during/after eating, general aspiration precautions (25 1440)  SLP Rec. for Method of Medication Administration: meds whole, with thin liquids, as tolerated (25 1440)     Monitor for Signs of Aspiration: notify SLP if any concerns (25 1440)  Recommended Diagnostics: No further SLP services recommended (25 1440)     Anticipated Discharge Disposition (SLP): inpatient rehabilitation facility (25 1440)     Therapy Frequency (Swallow): evaluation  "only (01/21/25 1440)  Predicted Duration Therapy Intervention (Days): 1 week (01/21/25 1440)  Oral Care Recommendations: Oral Care BID/PRN, Toothbrush (01/21/25 1440)        Daily Summary of Progress (SLP): progress toward functional goals as expected (01/21/25 1440)               Treatment Assessment (SLP): toleration of diet, no clinical signs of, pharyngeal dysphagia, continued, cognitive-linguistic disorder (01/21/25 1440)  Treatment Assessment Comments (SLP): No overt s/s of aspiration accross trials of thin liquid or regular solid consistenices. Prolonged mastication w/ regular solid trial, however no significant residue. Pt requesting to cont soft/chopped solid diet, stating she is on \"softer solid\" diet @ baseline. Goals added this date to address cognitive linguistic deficits. Baseline unknown, no family present to clarify. Ok to cont soft/chopped solid diet w/ thin liquids, SLP will sign off for dysphagia & cont to f/u for communication (01/21/25 1440)  Plan for Continued Treatment (SLP): continue treatment per plan of care (01/21/25 1440)                SWALLOW EVALUATION (Last 72 Hours)       SLP Adult Swallow Evaluation       Row Name 01/21/25 1440                   Rehab Evaluation    Document Type therapy note (daily note)  -        Subjective Information no complaints  -        Patient Observations alert;cooperative  -        Patient/Family/Caregiver Comments/Observations No family present  -        Patient Effort good  -        Symptoms Noted During/After Treatment none  -           Pain    Additional Documentation Pain Scale: FACES Pre/Post-Treatment (Group)  -           Pain Scale: FACES Pre/Post-Treatment    Pain: FACES Scale, Pretreatment 0-->no hurt  -        Posttreatment Pain Rating 0-->no hurt  -           SLP Treatment Clinical Impressions    Treatment Assessment (SLP) toleration of diet;no clinical signs of;pharyngeal dysphagia;continued;cognitive-linguistic disorder  - " "       Treatment Assessment Comments (SLP) No overt s/s of aspiration accross trials of thin liquid or regular solid consistenices. Prolonged mastication w/ regular solid trial, however no significant residue. Pt requesting to cont soft/chopped solid diet, stating she is on \"softer solid\" diet @ baseline. Goals added this date to address cognitive linguistic deficits. Baseline unknown, no family present to clarify. Ok to cont soft/chopped solid diet w/ thin liquids, SLP will sign off for dysphagia & cont to f/u for communication  -        Daily Summary of Progress (SLP) progress toward functional goals as expected  -        Plan for Continued Treatment (SLP) continue treatment per plan of care  -        Care Plan Review care plan/treatment goals reviewed  -           Recommendations    Therapy Frequency (Swallow) evaluation only  -        Predicted Duration Therapy Intervention (Days) 1 week  -        SLP Diet Recommendation soft to chew textures;chopped;thin liquids  -        Recommended Diagnostics No further SLP services recommended  -        Recommended Precautions and Strategies upright posture during/after eating;general aspiration precautions  -        Oral Care Recommendations Oral Care BID/PRN;Toothbrush  -        SLP Rec. for Method of Medication Administration meds whole;with thin liquids;as tolerated  -        Monitor for Signs of Aspiration notify SLP if any concerns  -        Anticipated Discharge Disposition (SLP) inpatient rehabilitation facility  -                  User Key  (r) = Recorded By, (t) = Taken By, (c) = Cosigned By      Initials Name Effective Dates     Elsa Ortiz, MS [facility]-SLP 05/12/23 -                     EDUCATION  The patient has been educated in the following areas:   Cognitive Impairment Communication Impairment Dysphagia (Swallowing Impairment) Oral Care/Hydration Modified Diet Instruction.        SLP GOALS       Row Name 01/21/25 1440             " "(LTG) Patient will demonstrate functional swallow for    Diet Texture (Demonstrate functional swallow) regular textures  -      Liquid viscosity (Demonstrate functional swallow) thin liquids  -      Nat[place] (Demonstrate functional swallow) independently (over 90% accuracy)  -      Time Frame (Demonstrate functional swallow) 1 week  -      Progress/Outcomes (Demonstrate functional swallow) goal no longer appropriate  -         (STG) Patient will tolerate trials of    Consistencies Trialed (Tolerate trials) soft to chew (chopped) textures;thin liquids  -      Desired Outcome (Tolerate trials) without signs/symptoms of aspiration;with adequate oral prep/transit/clearance  -      Nat[place] (Tolerate trials) independently (over 90% accuracy)  -      Time Frame (Tolerate trials) 1 week  -      Progress/Outcomes (Tolerate trials) other (see comments)  -      Comment (Tolerate trials) No overt s/s of aspiration w/ regular solid or thin liquid trials. Prolonged oral manipulation/mastication of regular solid trial; however no significant residue. Pt requesting to cont soft/chopped solid diet, stating she is on \"soft solid\" diet @ baseline  -         (STG) Patient will tolerate therapeutic trials of    Consistencies Trialed (Tolerate therapeutic trials) regular textures  -      Desired Outcome (Tolerate therapeutic trials) with adequate oral prep/transit/clearance  -      Nat[place] (Tolerate therapeutic trials) independently (over 90% accuracy)  -      Time Frame (Tolerate therapeutic trials) 1 week  -      Progress/Outcomes (Tolerate therapeutic trials) goal no longer appropriate  -         Patient will demonstrate functional language skills for return to discharge environment     Natoma with minimal cues  -      Time frame 1 week  -      Progress/Outcomes continuing progress toward goal  -         SLP Diagnostic Treatment     Patient will participate in further " assessment in the following areas clarification of baseline cognitive communication status  -MH      Time Frame (Diagnostic) 1 week  -MH      Progress/Outcomes (Additional Goal 1, SLP) goal ongoing  -MH      Comment (Diagnostic) Completed cog dx tx, goals added. Baseline unknown & no family present to clarify  -MH         Follow Directions Goal 2 (SLP)    Improve Ability to Follow Directions Goal 1 (SLP) multistep commands;80%;with minimal cues (75-90%)  -MH      Time Frame (Follow Directions Goal 1, SLP) 1 week  -MH      Progress (Ability to Follow Directions Goal 1, SLP) 60%;with moderate cues (50-74%)  -MH      Progress/Outcomes (Follow Directions Goal 1, SLP) continuing progress toward goal  -MH      Comment (Follow Directions Goal 1, SLP) Attention/memory deficits impacting  -MH         Comprehend Narrative Discourse Goal 1 (SLP)    Improve Comprehension of Narrative Discourse Goal 1 (SLP) respond appropriately to complex conversational statements;respond appropriately to complex conversational questions;80%;with minimal cues (75-90%)  -MH      Time Frame (Comprehend Narrative Discourse Goal 1, SLP) 1 week  -MH      Progress (Ability to Comprehend Narrative Discourse Goal 1, SLP) 60%;with moderate cues (50-74%)  -MH      Progress/Outcomes (Comprehend Narrative Discourse Goal 1, SLP) continuing progress toward goal  -MH         Comprehension at Phrase and Sentence Level Goal 1 (SLP)    Improve Reading Comprehension at Phrase and Sentence Level Goal 1 (SLP) sentence;answer simple written y/n questions;answer complex written y/n questions;follow simple written directions;follow complex written directions;80%;with minimal cues (75-90%)  -MH      Time Frame (Reading Comprehension at Phrase and Sentence Level Goal 1, SLP) 1 week  -MH      Progress/Outcomes (Reading Comprehension at Phrase and Sentence Level Goal 1, SLP) goal ongoing  -MH         Attention Goal 1 (SLP)    Improve Attention by Goal 1 (SLP) complete  "selective attention task;complete sustained attention task;80%;with minimal cues (75-90%)  -      Time Frame (Attention Goal 1, SLP) 1 week  -      Progress/Outcomes (Attention Goal 1, SLP) new goal  -         Memory Skills Goal 1 (SLP)    Improve Memory Skills Through Goal 1 (SLP) recalling related word lists immediately;recalling unrelated word lists immediately;recall details of the day;80%;with minimal cues (75-90%)  -      Time Frame (Memory Skills Goal 1, SLP) 1 week  -      Progress/Outcomes (Memory Skills Goal 1, SLP) new goal  -         Organizational Skills Goal 1 (SLP)    Improve Thought Organization Through Goal 1 (SLP) completing a divergent naming task;completing a convergent naming task;completing a verbal sequencing task;80%;with minimal cues (75-90%)  -      Time Frame (Thought Organization Skills Goal 1, SLP) 1 week  -      Progress/Outcomes (Thought Organization Skills Goal 1, SLP) new goal  -         Functional Problem Solving Skills Goal 1 (SLP)    Improve Problem Solving Through Goal 1 (SLP) answer questions about ADL problems;determine solutions to simple ADL/safety problems;answer \"what if\" questions;80%;with minimal cues (75-90%)  -      Time Frame (Problem Solving Goal 1, SLP) 1 week  -      Progress/Outcomes (Problem Solving Goal 1, SLP) new goal  -                User Key  (r) = Recorded By, (t) = Taken By, (c) = Cosigned By      Initials Name Provider Type    Elsa Roger MS CCC-SLP Speech and Language Pathologist                         Time Calculation:    Time Calculation- SLP       Row Name 01/21/25 7935             Time Calculation- SLP    SLP Start Time 1440  -      SLP Received On 01/21/25  -         Untimed Charges    97826-OS Treatment/ST Modification Prosth Aug Alter  40  -      31587-BL Treatment Swallow Minutes 30  -MH         Total Minutes    Untimed Charges Total Minutes 70  -MH       Total Minutes 70  -                User Key  (r) " = Recorded By, (t) = Taken By, (c) = Cosigned By      Initials Name Provider Type     Elsa Ortiz, MS CCC-SLP Speech and Language Pathologist                    Therapy Charges for Today       Code Description Service Date Service Provider Modifiers Qty    26446763778 HC ST TREATMENT SWALLOW 2 1/21/2025 Elsa Ortiz, MS CCC-SLP GN 1    79893249964 HC ST TREATMENT SPEECH 3 1/21/2025 Elsa Ortiz, MS [facility] GN 1                 MS RIC Fabian  1/21/2025

## 2025-01-21 NOTE — CASE MANAGEMENT/SOCIAL WORK
Continued Stay Note  [facility]     Patient Name: Roselia Rossi  MRN: [id number]  Today's Date: 1/21/2025    Admit Date: 1/17/2025    Plan: [facility]   Discharge Plan       Row Name 01/21/25 1237       Plan    Plan [facility]    Patient/Family in Agreement with Plan yes    Plan Comments CM spoke with Jane with [facility] and pre-cert is pending for the rehab. CM will continue to follow up.    Final Discharge Disposition Code 62 - inpatient rehab facility                   Discharge Codes    No documentation.                       Dannielle Samuel RN

## 2025-01-21 NOTE — PROGRESS NOTES
[facility] Medicine Services  PROGRESS NOTE    Patient Name: Roselia Rossi  : 1960  MRN: [id number]    Date of Admission: 2025  Primary Care Physician: Leatha Grigsby MD    Subjective   Subjective     CC: f/u cva    HPI: Up in bed. Notes intermittent constipation. No f/c. No n/v. No dyspnea. No HA.       Objective   Objective     Vital Signs:   Temp:  [97.6 °F (36.4 °C)-98.5 °F (36.9 °C)] 97.9 °F (36.6 °C)  Heart Rate:  [60-75] 66  Resp:  [14-18] 18  BP: (140-160)/(81-96) 140/93  Flow (L/min) (Oxygen Therapy):  [2] 2     Physical Exam:  NAD, alert, Samish  OP clear, dry MM  Neck supple  No LAD  RRR  CTAB  +BS, soft  ROBERTSON  Flat affect    Results Reviewed:  LAB RESULTS:      Lab 25  1053 25  044256 25  0000   WBC 7.95 7.96 8.62  --    HEMOGLOBIN 15.3 15.5 13.2  --    HEMATOCRIT 46.5 47.8* 41.2  --    PLATELETS 211 240 207  --    NEUTROS ABS 5.04 5.53 6.06  --    IMMATURE GRANS (ABS) 0.05 0.06* 0.03  --    LYMPHS ABS 1.81 1.26 1.47  --    MONOS ABS 0.64 0.59 0.66  --    EOS ABS 0.36 0.48* 0.36  --    MCV 88.9 90.0 90.9  --    LACTATE  --   --   --  1.0         Lab 25  1053 25  0442 25  0416   SODIUM 142 143 135*   POTASSIUM 3.9 4.1 4.5   CHLORIDE 107 108* 104   CO2 24.0 24.0 22.0   ANION GAP 11.0 11.0 9.0   BUN 15 16 20   CREATININE 1.09* 1.21* 1.47*   EGFR 56.1* 49.5* 39.2*   GLUCOSE 152* 200* 168*   CALCIUM 9.0 9.2 8.9   HEMOGLOBIN A1C  --   --  12.00*         Lab 25  1053 25  0442 25  0416   TOTAL PROTEIN 5.0* 5.5* 5.1*   ALBUMIN 2.1* 2.3* 2.1*   GLOBULIN 2.9 3.2 3.0   ALT (SGPT) 11 13 14   AST (SGOT) 19 16 15   BILIRUBIN 0.3 0.3 0.2   ALK PHOS 107 113 101               Lab 25  0416   CHOLESTEROL 133   LDL CHOL 76   HDL CHOL 34*   TRIGLYCERIDES 129         Lab 25  0000   FOLATE 6.70   VITAMIN B 12 >2,000*         Brief Urine Lab Results  (Last result in the past 365 days)        Color    Clarity   Blood   Leuk Est   Nitrite   Protein   CREAT   Urine HCG        11/05/24 1519             97.2                 Microbiology Results Abnormal       None            EEG    Result Date: 1/20/2025  Reason for referral: 64 y.o.female with eye twitching, consideration of seizures Technical Summary:  A 19 channel digital EEG was performed using the international 10-20 placement system, including eye leads and EKG leads. Duration: 24 minutes Findings: The patient sleeps throughout much of the study.  Diffuse low amplitude 5 to 7 Hz theta is present symmetrically over both hemispheres.  When the patient is briefly roused with auditory stimulation, there is an increase in EMG artifact in faster theta frequencies.  A clear posterior rhythm is not seen.  No focal features or epileptiform activity are present.  Photic stimulation does not change the background.  Hyperventilation is not performed. Video: Available Technical quality: Good Rhythm strip: Regular, 70 bpm SUMMARY: Mild generalized slow No focal features or epileptiform activity are seen     Impression: Diffuse cerebral dysfunction mild degree, nonspecific No evidence for epilepsy is seen on the study This report is transcribed using the Dragon dictation system.       Results for orders placed during the hospital encounter of 01/17/25    Adult Transthoracic Echo Complete W/ Cont if Necessary Per Protocol (With Agitated Saline)    Interpretation Summary    Left ventricular systolic function is normal. Estimated left ventricular EF = 55%    Left ventricular wall thickness is consistent with mild concentric hypertrophy.    Mild mitral valve stenosis is present. The mitral valve mean gradient is 6 mmHg.    Mild mitral regurgitation.    Trace tricuspid regurgitation.    Saline test results are negative for intracardiac shunting.    No cardiac source for embolus is seen.      Current medications:  Scheduled Meds:aspirin, 81 mg, Oral, Daily   Or  aspirin, 300 mg,  Rectal, Daily  atorvastatin, 80 mg, Oral, Nightly  castor oil-balsam peru, 1 Application, Topical, Q12H  heparin (porcine), 5,000 Units, Subcutaneous, Q12H  insulin lispro, 2-9 Units, Subcutaneous, 4x Daily AC & at Bedtime  ipratropium-albuterol, 3 mL, Nebulization, 4x Daily - RT  labetalol, 10 mg, Intravenous, Once  metoprolol succinate XL, 25 mg, Oral, Q24H  pharmacy consult - MTM, , Not Applicable, Daily  sacubitril-valsartan, 1 tablet, Oral, Q12H  sodium chloride, 10 mL, Intravenous, Q12H  sodium chloride, 10 mL, Intravenous, Q12H  ticagrelor, 90 mg, Oral, BID      Continuous Infusions:   PRN Meds:.  acetaminophen    senna-docusate sodium **AND** polyethylene glycol **AND** bisacodyl **AND** bisacodyl    Calcium Replacement - Follow Nurse / BPA Driven Protocol    dextrose    dextrose    glucagon (human recombinant)    Magnesium Standard Dose Replacement - Follow Nurse / BPA Driven Protocol    melatonin    ondansetron    Potassium Replacement - Follow Nurse / BPA Driven Protocol    sodium chloride    sodium chloride    sodium chloride    sodium chloride    Assessment & Plan   Assessment & Plan     Active Hospital Problems    Diagnosis  POA    **Stroke-like symptoms [R29.90]  Yes      Resolved Hospital Problems   No resolved problems to display.        Roselia Rossi is a 64-year-old female with a PMH significant for right hemispheric watershed strokes (2023, residual left hand weakness), uncontrolled HTN, uncontrolled HLD, CAD, CABG, CHF, uncontrolled T2DM, CKD, and thyroid disease who presents to [facility] via EMS transfer from [facility] with complaints of multifocal right hemispheric infarcts.  Patient initially presented to OSH on 1/14/2025 with complaints of left-sided weakness.  She underwent an MRI on 1/14 that revealed multifocal right hemispheric areas of restricted diffusion.  She was started on DAPT with aspirin and Plavix as well as high-dose statin.  On 1/16/2025, she was noted to be more  lethargic and generally weak.  She was unable to undergo repeat MRI and MRA secondary to agitation.  Repeat CT head per OSH MD with evolving infarcts and no hemorrhage.      Acute multifocal CVA  HTN  -Stroke team following, continue DAPT/statin. Team discussed with Dr. Young. Feels patient has significant receptive aphasia and does not have capacity at this time.   -PT/OT/SLP, awaiting rehab     A/C HFpEF   Hypoxia  -CXR on arrival with pulmonary edema. ECHO with diastolic dysfunction  -Resumed entresto, metoprolol  -S/P Lasix, on RA improved     Hyperglycemia w/ DMII, uncontrolled  -SSI     History of hypothyroidism:   -will continue home medications     Expected Discharge Location and Transportation:   Expected Discharge   Expected discharge date/ time has not been documented.     VTE Prophylaxis:  Pharmacologic & mechanical VTE prophylaxis orders are present.         AM-PAC 6 Clicks Score (PT): 10 (01/20/25 2000)    CODE STATUS:   Code Status and Medical Interventions: CPR (Attempt to Resuscitate); Full Support   Ordered at: 01/17/25 3004     Level Of Support Discussed With:    Patient     Code Status (Patient has no pulse and is not breathing):    CPR (Attempt to Resuscitate)     Medical Interventions (Patient has pulse or is breathing):    Full Support       Ismael Gomez MD  01/21/25

## 2025-01-21 NOTE — PROGRESS NOTES
"          Clinical Nutrition Assessment   Patient Name: Roselia Rossi  YOB: 1960  MRN: [id number]  Date of Encounter: 01/21/25 14:31 EST  Admission date: 1/17/2025  Reason for Visit: Follow-up protocol, Identified at risk by screening criteria, Nonhealing wound or pressure ulcer    Assessment   Nutrition Assessment   Admission Diagnosis:  Stroke-like symptoms [R29.90]    Problem List:    Stroke-like symptoms      PMH:   She  has a past medical history of Coronary artery disease, Diabetes mellitus, Disease of thyroid gland, Hyperlipidemia, and Hypertension.    PSH:  She  has a past surgical history that includes Coronary artery bypass graft; Tubal ligation; Cholecystectomy; Cardiac catheterization (N/A, 7/21/2017); Colonoscopy; and Cardiac catheterization (N/A, 9/2/2021).    Applicable Nutrition History:   CVA  HTN/HLD  CAD/CABG  CHF  T2DM  CKD  Thyroid dz    (01/20) WOC: DTPI? Stage 2 PI?    Anthropometrics   Height: Height: 165.1 cm (65\")  Last Filed Weight: Weight: 96.2 kg (212 lb) (01/18/25 1029)  Method:    BMI: BMI (Calculated): 35.3    UBW:    Weight     Weight (kg) Weight (lbs)   11/5/2024 91.989 kg  202 lb 12.8 oz    1/17/2025 96.48 kg  212 lb 11.2 oz    1/18/2025 96.163 kg  212 lb      Weight change: No significant changes    Nutrition Focused Physical Exam    Date: 01/21/25     Pt does not meet criteria for malnutrition diagnosis, at this time.      Subjective   Reported/Observed/Food/Nutrition Related History:   01/21/25  Patient screened per nutrition protocol for possible pressure injury of stage 2 or greater. Presented for stroke-like symptoms. Patient upset at time of visit. C/o being at [facility] and not [place] as all of her providers are there. No weight loss noted. Patient stated she eats well. Declined chewing or swallowing difficulties. Declined all ONS for wound healing as per patient, her kidney doctor doesn't allow her to have any extra protein. NKFA.    Current Nutrition " Prescription   PO: Diet: Cardiac, Diabetic; Healthy Heart (2-3 Na+); Consistent Carbohydrate; Texture: Soft to Chew (NDD 3); Soft to Chew: Chopped Meat; Fluid Consistency: Thin (IDDSI 0)  Oral Nutrition Supplement: n/a  Intake: insuff data    Assessment & Plan   Nutrition Diagnosis   Date: 01/21/25           Updated:    Problem Increased nutrient needs - protein   Etiology Demand for wound healing   Signs/Symptoms DTPI or PI 2   Status: New    Goal:   Nutrition to support treatment and Establish PO    Nutrition Intervention      Follow treatment progress, Care plan reviewed, Interview for preferences, Encourage intake, Supplement offered/refused    Nutrition POC  Encourage adequate PO intake as able    Monitoring/Evaluation:   Per protocol, PO intake, Supplement intake, Weight, Skin status, Symptoms, POC/GOC    Sammi Hurt MS,RD,LD  Time Spent: 30min

## 2025-01-21 NOTE — THERAPY EVALUATION
Patient Name: Roselia Rossi  : 1960    MRN: [id number]                              Today's Date: 2025       Admit Date: 2025    Visit Dx:     ICD-10-CM ICD-9-CM   1. Cognitive communication deficit  R41.841 799.52   2. Dysphagia, unspecified type  R13.10 787.20   3. Palpitations  R00.2 785.1   4. Cryptogenic stroke  I63.9 434.91   5. Cerebrovascular accident (CVA), unspecified mechanism  I63.9 434.91     Patient Active Problem List   Diagnosis    Abnormal stress test    Type 2 diabetes mellitus with hyperglycemia    Stroke-like symptoms     Past Medical History:   Diagnosis Date    Coronary artery disease     Diabetes mellitus     Disease of thyroid gland     Hyperlipidemia     Hypertension      Past Surgical History:   Procedure Laterality Date    CARDIAC CATHETERIZATION N/A 2017    Procedure: Left Heart Cath;  Surgeon: Tommy Haider MD;  Location:  [facility];  Service:     CARDIAC CATHETERIZATION N/A 2021    Procedure: Left Heart Cath;  Surgeon: Tommy Haider MD;  Location:  [facility];  Service: Cardiovascular;  Laterality: N/A;    CHOLECYSTECTOMY      COLONOSCOPY      CORONARY ARTERY BYPASS GRAFT      TUBAL ABDOMINAL LIGATION        General Information       Row Name 25 1347          Physical Therapy Time and Intention    Document Type therapy note (daily note)  -LW     Mode of Treatment physical therapy  -LW       Row Name 25 1347          General Information    Patient Profile Reviewed yes  -LW     Existing Precautions/Restrictions fall;other (see comments)  L hemiparesis, LUE sling issued for mobility, [place] (aids in room), Pt wiggles out of chair per RN, poor sitting balance  -LW       Row Name 25 1347          Cognition    Orientation Status (Cognition) oriented x 3  -LW       Row Name 25 1347          Safety Issues/Impairments Affecting Functional Mobility    Safety Issues Affecting Function (Mobility)  ability to follow commands;at risk behavior observed;awareness of need for assistance;friction/shear risk;impulsivity;insight into deficits/self-awareness;judgment;problem-solving;safety precaution awareness;safety precautions follow-through/compliance;sequencing abilities  -LW     Impairments Affecting Function (Mobility) balance;coordination;endurance/activity tolerance;cognition;grasp;motor control;motor planning;muscle tone abnormal;strength;sensation/sensory awareness;postural/trunk control;range of motion (ROM)  -LW     Cognitive Impairments, Mobility Safety/Performance attention;awareness, need for assistance;impulsivity;insight into deficits/self-awareness;judgment;problem-solving/reasoning;safety precaution awareness;safety precaution follow-through;sequencing abilities  -LW     Comment, Safety Issues/Impairments (Mobility) L lean, RUE pushing  -LW               User Key  (r) = Recorded By, (t) = Taken By, (c) = Cosigned By      Initials Name Provider Type    LW Dasia Diallo PT Physical Therapist                   Mobility       Row Name 01/21/25 2679          Bed Mobility    Bed Mobility supine-sit;scooting/bridging;sit-supine;rolling left;rolling right  -LW     Rolling Left [place] (Bed Mobility) 1 person assist;minimum assist (75% patient effort);verbal cues;nonverbal cues (demo/gesture)  -LW     Rolling Right [place] (Bed Mobility) moderate assist (50% patient effort);1 person assist;verbal cues;nonverbal cues (demo/gesture)  -LW     Scooting/Bridging [place] (Bed Mobility) maximum assist (25% patient effort);2 person assist;verbal cues;nonverbal cues (demo/gesture);other (see comments)  lateral scooting at EOB x 2 trials  -LW     Supine-Sit [place] (Bed Mobility) verbal cues;nonverbal cues (demo/gesture);moderate assist (50% patient effort);1 person assist  -LW     Sit-Supine [place] (Bed Mobility) maximum assist (25% patient effort);nonverbal cues (demo/gesture);verbal cues;2  person assist  -LW     Assistive Device (Bed Mobility) bed rails;head of bed elevated;repositioning sheet  -LW     Comment, (Bed Mobility) Periods of CGA for trunk balance at EOB but mostly mod<>maxA due to L leaning, LUE pushing, and impulsive leaning in all directions d/t attention. Positioned x 2 trials at end of session to weight patient in attempts to get bed alarm to work and rolling to correct side to align with nursing shift turns  -       Row Name 01/21/25 1349          Bed-Chair Transfer    Bed-Chair [place] (Transfers) not tested;other (see comments)  chair deferred per RN d/t poor sitting balance and constant wiggling toward out of chair  -       Row Name 01/21/25 1349          Sit-Stand Transfer    Sit-Stand [place] (Transfers) maximum assist (25% patient effort);2 person assist;verbal cues;nonverbal cues (demo/gesture)  -     Assistive Device (Sit-Stand Transfers) other (see comments)  UE support, Pt has some L shoulder flexion but minimal<>no shoulder depression and no active  on 1/21  -       Row Name 01/21/25 1349          Gait/Stairs (Locomotion)    [place] Level (Gait) unable to assess  -LW     Patient was able to Ambulate no, other medical factors prevent ambulation  -LW     Reason Patient was unable to Ambulate Excessive Weakness  -LW     Comment, (Gait/Stairs) gait deferred d/t poor standing balance at bedside  -               User Key  (r) = Recorded By, (t) = Taken By, (c) = Cosigned By      Initials Name Provider Type    LW Dasia Diallo PT Physical Therapist                   Obj/Interventions       Row Name 01/21/25 1356          Motor Skills    Motor Skills motor control/coordination interventions  -LW     Motor Control/Coordination Interventions gross motor coordination activities  -LW     Gross Motor Skill, Impairments Detail Patient sat EOB x 5 minutes working on orientation to midline using demo cues and visual fixation with various objects around room,  tactile and verbal cues for hand positioning to break up RUE pushing, mutliple trials maintaining LLE foot placement on floor-Pt stating she is not moving LLE and it was moving on its own. Best posture/midline positioning with RUE flat to bed and maxA to hold LLE in foot flat. Patient constantly moving and stating she was falling over. She was educated at length of use it/lose it principle and importance of attempting self-correcting sitting balance for best recovery  -LW       Row Name 01/21/25 5514          Balance    Balance Assessment sitting static balance;sitting dynamic balance;standing static balance  -LW     Static Sitting Balance maximum assist;1-person assist;1 person to manage equipment;other (see comments);verbal cues;non-verbal cues (demo/gesture)  some instances of CGA, overall mod<>max  -LW     Dynamic Sitting Balance maximum assist;1-person assist;1 person to manage equipment;verbal cues;non-verbal cues (demo/gesture)  -LW     Position, Sitting Balance unsupported;sitting edge of bed  -LW     Static Standing Balance maximum assist;2-person assist;non-verbal cues (demo/gesture);verbal cues  -LW     Position/Device Used, Standing Balance supported;other (see comments)  therapists arms  -LW     Comment, Balance STS x 2 trials at EOB. 75% blocking LLE, mod<>max of 2 for trunk balance, Pt pushing left and folding forward when fatigued. VC for postural awareness and attempts at visual fixation for midline orientation  -LW               User Key  (r) = Recorded By, (t) = Taken By, (c) = Cosigned By      Initials Name Provider Type    Dasia Jennings PT Physical Therapist                   Goals/Plan    No documentation.                  Clinical Impression       Row Name 01/21/25 1402          Pain Scale: FACES Pre/Post-Treatment    Pain: FACES Scale, Pretreatment 0-->no hurt  -LW     Posttreatment Pain Rating 0-->no hurt  -LW       Row Name 01/21/25 1408          Plan of Care Review    Plan of Care  Reviewed With patient  -LW     Outcome Evaluation Pt participated in sitting balance on EOB focusing on midline orientation and foot/hand positioning for optimal alignment; she had instances of CGA for balance but mostly required mod<>max to maintain mildine positioning. 2 standing trials at EOB where she demo'd poor postural awareness and L leaning; gait deferred. PT will continue to follow while admitted.  -LW       Row Name 01/21/25 1402          Therapy Assessment/Plan (PT)    Criteria for Skilled Interventions Met (PT) yes;meets criteria;skilled treatment is necessary  -LW       Row Name 01/21/25 1402          Vital Signs    Pre Systolic BP Rehab 161  -LW     Pre Treatment Diastolic BP 96  -LW     Posttreatment Heart Rate (beats/min) 72  -LW     Pre Patient Position Supine  -LW     Post Patient Position Sitting  -LW       Row Name 01/21/25 1402          Positioning and Restraints    Pre-Treatment Position in bed  -LW     Post Treatment Position bed  -LW     In Bed notified nsg;side lying left;call light within reach;encouraged to call for assist;exit alarm on;side rails up x1   RN and tech notified that only wall box alarm was activated, tech present to address bed alarm that would not arm-she will put in a work order. L siderail up x 1 for safety  -LW               User Key  (r) = Recorded By, (t) = Taken By, (c) = Cosigned By      Initials Name Provider Type    LW Dasia Diallo, PT Physical Therapist                   Outcome Measures       Row Name 01/21/25 1404          How much help from another person do you currently need...    Turning from your back to your side while in flat bed without using bedrails? 2  -LW     Moving from lying on back to sitting on the side of a flat bed without bedrails? 2  -LW     Moving to and from a bed to a chair (including a wheelchair)? 2  -LW     Standing up from a chair using your arms (e.g., wheelchair, bedside chair)? 2  -LW     Climbing 3-5 steps with a railing? 1  -LW      To walk in hospital room? 1  -LW     AM-PAC 6 Clicks Score (PT) 10  -     Highest Level of Mobility Goal 4 --> Transfer to chair/commode  -LW       Row Name 01/21/25 1000          Modified Jackson Scale    Modified Jackson Scale 4 - Moderately severe disability.  Unable to walk without assistance, and unable to attend to own bodily needs without assistance.  -       Row Name 01/21/25 1406 01/21/25 1000       Functional Assessment    Outcome Measure Options AM-PAC 6 Clicks Basic Mobility (PT)  -LW AM-PAC 6 Clicks Daily Activity (OT)  -CS              User Key  (r) = Recorded By, (t) = Taken By, (c) = Cosigned By      Initials Name Provider Type    CS Bryan Powell OT Occupational Therapist    LW Dasia Diallo, PT Physical Therapist                                 Physical Therapy Education       Title: PT OT SLP Therapies (In Progress)       Topic: Physical Therapy (In Progress)       Point: Mobility training (In Progress)       Learning Progress Summary            Patient Acceptance, E, NR by  at 1/21/2025 1407    Acceptance, E, NR by KG at 1/19/2025 1208                      Point: Home exercise program (In Progress)       Learning Progress Summary            Patient Acceptance, E, NR by  at 1/21/2025 1407    Acceptance, E, NR by KG at 1/19/2025 1208                      Point: Body mechanics (In Progress)       Learning Progress Summary            Patient Acceptance, E, NR by LW at 1/21/2025 1407    Acceptance, E, NR by KG at 1/19/2025 1208                      Point: Precautions (In Progress)       Learning Progress Summary            Patient Acceptance, E, NR by  at 1/21/2025 1407    Acceptance, E, NR by KG at 1/19/2025 1208                                      User Key       Initials Effective Dates Name Provider Type Discipline     12/13/24 -  Luh Montalvo Physical Therapist PT     11/15/24 -  Dasia Diallo PT Physical Therapist PT                  PT Recommendation and Plan     Outcome  Evaluation: Pt participated in sitting balance on EOB focusing on midline orientation and foot/hand positioning for optimal alignment; she had instances of CGA for balance but mostly required mod<>max to maintain mildine positioning. 2 standing trials at EOB where she demo'd poor postural awareness and L leaning; gait deferred. PT will continue to follow while admitted.     Time Calculation:         PT Charges       Row Name 01/21/25 1410             Time Calculation    Start Time 1307  -LW      PT Received On 01/21/25  -LW         Timed Charges    15241 -  PT Neuromuscular Reeducation Minutes 10  -LW      71802 - PT Therapeutic Activity Minutes 13  -LW         Total Minutes    Timed Charges Total Minutes 23  -LW       Total Minutes 23  -LW                User Key  (r) = Recorded By, (t) = Taken By, (c) = Cosigned By      Initials Name Provider Type    LW Dasia Diallo PT Physical Therapist                  Therapy Charges for Today       Code Description Service Date Service Provider Modifiers Qty    55807710485  PT NEUROMUSC RE EDUCATION EA 15 MIN 1/21/2025 Dasia Diallo, PT GP 1    23022823341 HC PT THERAPEUTIC ACT EA 15 MIN 1/21/2025 Dasia Diallo, PT GP 1    [id number]  PT THER SUPP EA 15 MIN 1/21/2025 Dasia Diallo, PT GP 2            PT G-Codes  Outcome Measure Options: AM-PAC 6 Clicks Basic Mobility (PT)  AM-PAC 6 Clicks Score (PT): 10  AM-PAC 6 Clicks Score (OT): 11  Modified Kenedy Scale: 4 - Moderately severe disability.  Unable to walk without assistance, and unable to attend to own bodily needs without assistance.  PT Discharge Summary  Anticipated Discharge Disposition (PT): inpatient rehabilitation facility    Dasia Diallo PT  1/21/2025

## 2025-01-22 ENCOUNTER — APPOINTMENT (OUTPATIENT)
Dept: GENERAL RADIOLOGY | Facility: HOSPITAL | Age: 65
End: 2025-01-22
Payer: COMMERCIAL

## 2025-01-22 ENCOUNTER — APPOINTMENT (OUTPATIENT)
Dept: CT IMAGING | Facility: HOSPITAL | Age: 65
End: 2025-01-22
Payer: COMMERCIAL

## 2025-01-22 LAB
ANION GAP SERPL CALCULATED.3IONS-SCNC: 7 MMOL/L (ref 5–15)
BUN SERPL-MCNC: 14 MG/DL (ref 8–23)
BUN/CREAT SERPL: 11.7 (ref 7–25)
CALCIUM SPEC-SCNC: 9.1 MG/DL (ref 8.6–10.5)
CHLORIDE SERPL-SCNC: 104 MMOL/L (ref 98–107)
CO2 SERPL-SCNC: 30 MMOL/L (ref 22–29)
CREAT SERPL-MCNC: 1.2 MG/DL (ref 0.57–1)
EGFRCR SERPLBLD CKD-EPI 2021: 50 ML/MIN/1.73
GLUCOSE BLDC GLUCOMTR-MCNC: 134 MG/DL (ref 70–130)
GLUCOSE BLDC GLUCOMTR-MCNC: 156 MG/DL (ref 70–130)
GLUCOSE BLDC GLUCOMTR-MCNC: 205 MG/DL (ref 70–130)
GLUCOSE BLDC GLUCOMTR-MCNC: 278 MG/DL (ref 70–130)
GLUCOSE BLDC GLUCOMTR-MCNC: 98 MG/DL (ref 70–130)
GLUCOSE SERPL-MCNC: 95 MG/DL (ref 65–99)
POTASSIUM SERPL-SCNC: 3.5 MMOL/L (ref 3.5–5.2)
POTASSIUM SERPL-SCNC: 4.1 MMOL/L (ref 3.5–5.2)
SODIUM SERPL-SCNC: 141 MMOL/L (ref 136–145)
VIT B1 BLD-SCNC: 122.7 NMOL/L (ref 66.5–200)

## 2025-01-22 PROCEDURE — 94761 N-INVAS EAR/PLS OXIMETRY MLT: CPT

## 2025-01-22 PROCEDURE — 94799 UNLISTED PULMONARY SVC/PX: CPT

## 2025-01-22 PROCEDURE — 72125 CT NECK SPINE W/O DYE: CPT

## 2025-01-22 PROCEDURE — 99232 SBSQ HOSP IP/OBS MODERATE 35: CPT | Performed by: INTERNAL MEDICINE

## 2025-01-22 PROCEDURE — 63710000001 INSULIN LISPRO (HUMAN) PER 5 UNITS: Performed by: FAMILY MEDICINE

## 2025-01-22 PROCEDURE — 84132 ASSAY OF SERUM POTASSIUM: CPT | Performed by: HOSPITALIST

## 2025-01-22 PROCEDURE — 82948 REAGENT STRIP/BLOOD GLUCOSE: CPT

## 2025-01-22 PROCEDURE — 94664 DEMO&/EVAL PT USE INHALER: CPT

## 2025-01-22 PROCEDURE — 71045 X-RAY EXAM CHEST 1 VIEW: CPT

## 2025-01-22 PROCEDURE — 25010000002 HEPARIN (PORCINE) PER 1000 UNITS: Performed by: STUDENT IN AN ORGANIZED HEALTH CARE EDUCATION/TRAINING PROGRAM

## 2025-01-22 PROCEDURE — 80048 BASIC METABOLIC PNL TOTAL CA: CPT | Performed by: HOSPITALIST

## 2025-01-22 RX ORDER — POTASSIUM CHLORIDE 1500 MG/1
40 TABLET, EXTENDED RELEASE ORAL EVERY 4 HOURS
Status: COMPLETED | OUTPATIENT
Start: 2025-01-22 | End: 2025-01-22

## 2025-01-22 RX ORDER — IPRATROPIUM BROMIDE AND ALBUTEROL SULFATE 2.5; .5 MG/3ML; MG/3ML
3 SOLUTION RESPIRATORY (INHALATION) EVERY 4 HOURS PRN
Status: DISCONTINUED | OUTPATIENT
Start: 2025-01-22 | End: 2025-01-24 | Stop reason: HOSPADM

## 2025-01-22 RX ORDER — LIDOCAINE 4 G/G
2 PATCH TOPICAL
Status: DISCONTINUED | OUTPATIENT
Start: 2025-01-22 | End: 2025-01-24 | Stop reason: HOSPADM

## 2025-01-22 RX ADMIN — LIDOCAINE 2 PATCH: 4 PATCH TOPICAL at 02:04

## 2025-01-22 RX ADMIN — SACUBITRIL AND VALSARTAN 1 TABLET: 49; 51 TABLET, FILM COATED ORAL at 20:39

## 2025-01-22 RX ADMIN — SACUBITRIL AND VALSARTAN 1 TABLET: 49; 51 TABLET, FILM COATED ORAL at 15:46

## 2025-01-22 RX ADMIN — BISACODYL 5 MG: 5 TABLET, COATED ORAL at 20:39

## 2025-01-22 RX ADMIN — INSULIN LISPRO 4 UNITS: 100 INJECTION, SOLUTION INTRAVENOUS; SUBCUTANEOUS at 20:51

## 2025-01-22 RX ADMIN — TICAGRELOR 90 MG: 90 TABLET ORAL at 15:44

## 2025-01-22 RX ADMIN — POTASSIUM CHLORIDE 40 MEQ: 1500 TABLET, EXTENDED RELEASE ORAL at 07:31

## 2025-01-22 RX ADMIN — Medication 1 APPLICATION: at 15:48

## 2025-01-22 RX ADMIN — POTASSIUM CHLORIDE 40 MEQ: 1500 TABLET, EXTENDED RELEASE ORAL at 15:45

## 2025-01-22 RX ADMIN — TICAGRELOR 90 MG: 90 TABLET ORAL at 20:41

## 2025-01-22 RX ADMIN — POLYVINYL ALCOHOL 2 DROP: 1.4 SOLUTION/ DROPS OPHTHALMIC at 15:48

## 2025-01-22 RX ADMIN — Medication 10 ML: at 01:56

## 2025-01-22 RX ADMIN — ATORVASTATIN CALCIUM 80 MG: 40 TABLET, FILM COATED ORAL at 20:39

## 2025-01-22 RX ADMIN — Medication 1 APPLICATION: at 01:55

## 2025-01-22 RX ADMIN — ASPIRIN 81 MG CHEWABLE TABLET 81 MG: 81 TABLET CHEWABLE at 15:46

## 2025-01-22 RX ADMIN — IPRATROPIUM BROMIDE AND ALBUTEROL SULFATE 3 ML: 2.5; .5 SOLUTION RESPIRATORY (INHALATION) at 09:18

## 2025-01-22 RX ADMIN — HEPARIN SODIUM 5000 UNITS: 5000 INJECTION INTRAVENOUS; SUBCUTANEOUS at 15:47

## 2025-01-22 RX ADMIN — METOPROLOL SUCCINATE 25 MG: 25 TABLET, EXTENDED RELEASE ORAL at 15:46

## 2025-01-22 RX ADMIN — HEPARIN SODIUM 5000 UNITS: 5000 INJECTION INTRAVENOUS; SUBCUTANEOUS at 20:39

## 2025-01-22 RX ADMIN — Medication 1 APPLICATION: at 20:41

## 2025-01-22 NOTE — PROGRESS NOTES
[facility] Medicine Services  PROGRESS NOTE    Patient Name: Roselia Rossi  : 1960  MRN: [id number]    Date of Admission: 2025  Primary Care Physician: Leatha Grigsby MD    Subjective   Subjective     CC:   Follow-up for CVA    HPI:   Patient seen and examined in the morning.  She was very lethargic and less communicable.  Again seen in the afternoon but she was more awake.  She was sitting up eating her lunch.  She does not have any acute complaints currently.    Objective   Objective     Vital Signs:   Temp:  [98 °F (36.7 °C)-98.3 °F (36.8 °C)] 98.2 °F (36.8 °C)  Heart Rate:  [61-72] 69  Resp:  [16-18] 16  BP: (133-184)/() 165/80     Physical Exam:  General: Comfortable, not in distress, conversant and cooperative  Head: Atraumatic and normocephalic  Eyes: No Icterus. No pallor  Ears:  Ears appear intact with no abnormalities noted  Throat: No oral lesions, no thrush  Neck: Supple, trachea midline  Lungs: Clear to auscultation bilaterally, equal air entry, no wheezing or crackles  Heart:  Normal S1 and S2, no murmur, no gallop, No JVD, no lower extremity swelling  Abdomen:  Soft, no tenderness, no organomegaly, normal bowel sounds, no organomegaly  Extremities: pulses equal bilaterally  Skin: No bleeding, bruising or rash, normal skin turgor and elasticity  Neurologic: Cranial nerves appear intact with no evidence of facial asymmetry, normal motor and sensory functions in all 4 extremities  Psych: Alert and oriented x 3, normal mood    Results Reviewed:  LAB RESULTS:      Lab 25  1053 25  0442 25  0416 25  0000   WBC 7.95 7.96 8.62  --    HEMOGLOBIN 15.3 15.5 13.2  --    HEMATOCRIT 46.5 47.8* 41.2  --    PLATELETS 211 240 207  --    NEUTROS ABS 5.04 5.53 6.06  --    IMMATURE GRANS (ABS) 0.05 0.06* 0.03  --    LYMPHS ABS 1.81 1.26 1.47  --    MONOS ABS 0.64 0.59 0.66  --    EOS ABS 0.36 0.48* 0.36  --    MCV 88.9 90.0 90.9  --     LACTATE  --   --   --  1.0         Lab 01/22/25  0323 01/20/25  1053 01/19/25  0442 01/18/25  0416   SODIUM 141 142 143 135*   POTASSIUM 3.5 3.9 4.1 4.5   CHLORIDE 104 107 108* 104   CO2 30.0* 24.0 24.0 22.0   ANION GAP 7.0 11.0 11.0 9.0   BUN 14 15 16 20   CREATININE 1.20* 1.09* 1.21* 1.47*   EGFR 50.0* 56.1* 49.5* 39.2*   GLUCOSE 95 152* 200* 168*   CALCIUM 9.1 9.0 9.2 8.9   HEMOGLOBIN A1C  --   --   --  12.00*         Lab 01/20/25  1053 01/19/25  0442 01/18/25  0416   TOTAL PROTEIN 5.0* 5.5* 5.1*   ALBUMIN 2.1* 2.3* 2.1*   GLOBULIN 2.9 3.2 3.0   ALT (SGPT) 11 13 14   AST (SGOT) 19 16 15   BILIRUBIN 0.3 0.3 0.2   ALK PHOS 107 113 101               Lab 01/18/25  0416   CHOLESTEROL 133   LDL CHOL 76   HDL CHOL 34*   TRIGLYCERIDES 129         Lab 01/18/25  0000   FOLATE 6.70   VITAMIN B 12 >2,000*         Brief Urine Lab Results  (Last result in the past 365 days)        Color   Clarity   Blood   Leuk Est   Nitrite   Protein   CREAT   Urine HCG        11/05/24 1519             97.2                 Microbiology Results Abnormal       None            CT Cervical Spine Without Contrast    Result Date: 1/22/2025  CT CERVICAL SPINE WO CONTRAST Date of Exam: 1/22/2025 2:11 AM EST Indication: Neck pain. Comparison: None available. Technique: Axial CT images were obtained of the cervical spine without contrast administration.  Reconstructed coronal and sagittal images were also obtained. Automated exposure control and iterative construction methods were used. Findings: No acute fracture is identified. The craniocervical junction appears intact. The alignment is anatomic. The vertebral body heights appear normal. There are moderate discogenic changes at C5-6. The prevertebral soft tissues are unremarkable. C2-3: Mild disc bulge eccentric to the left. Mild right facet arthropathy. No spinal canal stenosis. Mild left neural foraminal stenosis. C3-4: Mild disc bulge. Moderate right and mild left facet arthropathy. Mild left  uncovertebral hypertrophy. No spinal canal stenosis. Mild left neural foraminal stenosis. C4-5: Mild disc osteophyte complex eccentric to the left. Mild right and moderate left facet arthropathy. Mild right and moderate left uncovertebral hypertrophy. Mild spinal canal stenosis. Mild right and severe left neural foraminal stenosis. C5-6: Moderate disc osteophyte complex. Moderate bilateral facet arthropathy. Severe right and moderate left uncovertebral hypertrophy. Mild spinal canal stenosis. Severe right and moderate to severe left neural foraminal stenosis. C6-7: Mild bilateral facet arthropathy. Mild bilateral uncovertebral hypertrophy. No spinal canal stenosis. Mild bilateral neural foraminal stenosis. C7-T1: Mild right facet arthropathy. No spinal canal stenosis. No neural foraminal stenosis.     Impression: Impression: 1.No acute osseous process identified. 2.Multilevel degenerative changes of cervical spine as described above, most prominent at C4-5 and C5-6 where there is severe neural foraminal stenosis. Electronically Signed: Félix Cole MD  1/22/2025 8:48 AM EST  Workstation ID: [id number]     Results for orders placed during the hospital encounter of 01/17/25    Adult Transthoracic Echo Complete W/ Cont if Necessary Per Protocol (With Agitated Saline)    Interpretation Summary    Left ventricular systolic function is normal. Estimated left ventricular EF = 55%    Left ventricular wall thickness is consistent with mild concentric hypertrophy.    Mild mitral valve stenosis is present. The mitral valve mean gradient is 6 mmHg.    Mild mitral regurgitation.    Trace tricuspid regurgitation.    Saline test results are negative for intracardiac shunting.    No cardiac source for embolus is seen.      Current medications:  Scheduled Meds:aspirin, 81 mg, Oral, Daily   Or  aspirin, 300 mg, Rectal, Daily  atorvastatin, 80 mg, Oral, Nightly  castor oil-balsam peru, 1 Application, Topical, Q12H  heparin  (porcine), 5,000 Units, Subcutaneous, Q12H  insulin lispro, 2-9 Units, Subcutaneous, 4x Daily AC & at Bedtime  labetalol, 10 mg, Intravenous, Once  Lidocaine, 2 patch, Transdermal, Q24H  metoprolol succinate XL, 25 mg, Oral, Q24H  pharmacy consult - MTM, , Not Applicable, Daily  sacubitril-valsartan, 1 tablet, Oral, Q12H  sodium chloride, 10 mL, Intravenous, Q12H  sodium chloride, 10 mL, Intravenous, Q12H  ticagrelor, 90 mg, Oral, BID      Continuous Infusions:   PRN Meds:.  acetaminophen    senna-docusate sodium **AND** polyethylene glycol **AND** bisacodyl **AND** bisacodyl    Calcium Replacement - Follow Nurse / BPA Driven Protocol    dextrose    dextrose    glucagon (human recombinant)    ipratropium-albuterol    Magnesium Standard Dose Replacement - Follow Nurse / BPA Driven Protocol    melatonin    ondansetron    polyvinyl alcohol    Potassium Replacement - Follow Nurse / BPA Driven Protocol    sodium chloride    sodium chloride    sodium chloride    sodium chloride    Assessment & Plan   Assessment & Plan     Active Hospital Problems    Diagnosis  POA    **Stroke-like symptoms [R29.90]  Yes      Resolved Hospital Problems   No resolved problems to display.     Summary:  Roselia Rossi is a 64-year-old female with a PMH significant for right hemispheric watershed strokes (2023, residual left hand weakness), uncontrolled HTN, uncontrolled HLD, CAD, CABG, CHF, uncontrolled T2DM, CKD, and thyroid disease who presents to [facility] via EMS transfer from [facility] with complaints of multifocal right hemispheric infarcts.  Patient initially presented to OSH on 1/14/2025 with complaints of left-sided weakness.  She underwent an MRI on 1/14 that revealed multifocal right hemispheric areas of restricted diffusion.  She was started on DAPT with aspirin and Plavix as well as high-dose statin.  On 1/16/2025, she was noted to be more lethargic and generally weak.  She was unable to undergo repeat MRI and MRA secondary  to agitation.  Repeat CT head per OSH MD with evolving infarcts and no hemorrhage.      Acute right hemispheric multifocal ischemic CVA  HTN  Possible etiology: Cardioembolic  Patient was on aspirin and Plavix at home.  Found to be Plavix unresponsive with P2 Y12 of more than 218.  Switched to Brilinta  Continue intensive statins 80 mg at bedtime  No evidence of telemetry.  Might benefit from event monitor upon discharge  My partner discussed with Dr. Young/neurology.  Patient with significant receptive aphasia and does not have capacity  Stroke team following  Continue PT and OT  Needs rehab     Acute decompensated diastolic heart failure with pulmonary edema   Acute hypoxia, improved   HFpEF   CXR from 1/18/2025 with pulmonary edema  Echocardiogram with normal ejection fraction  Improved with diuresis  Repeat chest x-ray pending  Continue Entresto and metoprolol     Type 2 diabetes  A1c 12.0  Continue SSI     Hx of hypothyroidism:   Will continue home medications     Expected Discharge Location and Transportation: Rehab  Expected Discharge   Expected Discharge Date: 1/24/2025; Expected Discharge Time:      VTE Prophylaxis:  Pharmacologic & mechanical VTE prophylaxis orders are present.         AM-PAC 6 Clicks Score (PT): 10 (01/21/25 2000)    CODE STATUS:   Code Status and Medical Interventions: CPR (Attempt to Resuscitate); Full Support   Ordered at: 01/17/25 5428     Level Of Support Discussed With:    Patient     Code Status (Patient has no pulse and is not breathing):    CPR (Attempt to Resuscitate)     Medical Interventions (Patient has pulse or is breathing):    Full Support       Yecenia Murray MD  01/22/25

## 2025-01-22 NOTE — NURSING NOTE
Pt had unwitnessed fall on 1/22/25 which was discovered at approximately 01:38. Blue pad alarm alarmed to RN phone and within the minute, RN was in room, accompanied by 2 other staff RNs. Upon entering room, pt was seated on the floor on the left side of the bed. Pt reported that she had intentionally gotten out of bed and had thrown her pillows on the floor to cushion her fall. She reported that she climbed over the rails. When asked why, pt reported she just wanted to get the attention of staff. Pt reported that she had some neck pain and hit it on the bed rail. Charge RN, House Supervisor and physician were promptly notified. CT of cervical spine was ordered and is pending at this time. Lidocaine patch was ordered and is placed on neck where pain was identified. Pt safety continues to be monitored and alarms are on.

## 2025-01-22 NOTE — NURSING NOTE
WOC consulted for ISOTOUR pump for specialty bed    Sensory Perception: 3-->slightly limited  Moisture: 2-->very moist  Activity: 2-->chairfast  Mobility: 2-->very limited  Nutrition: 3-->adequate  Friction and Shear: 1-->problem  Rickie Score: 13 (01/22/25 0200)    HIGH risk for pressure injury development.    Patient is utilizing [employer] PUMP # 12. At DC, after cleaning, please notify transport to have pump returned to storage.    Luca Hernandez RN, BSN, CCRN, CWOCN  Wound, Ostomy and Continence (WOC) Department  [facility]

## 2025-01-23 LAB
GLUCOSE BLDC GLUCOMTR-MCNC: 119 MG/DL (ref 70–130)
GLUCOSE BLDC GLUCOMTR-MCNC: 129 MG/DL (ref 70–130)
GLUCOSE BLDC GLUCOMTR-MCNC: 171 MG/DL (ref 70–130)
GLUCOSE BLDC GLUCOMTR-MCNC: 211 MG/DL (ref 70–130)

## 2025-01-23 PROCEDURE — 82948 REAGENT STRIP/BLOOD GLUCOSE: CPT

## 2025-01-23 PROCEDURE — 92507 TX SP LANG VOICE COMM INDIV: CPT

## 2025-01-23 PROCEDURE — 25010000002 HEPARIN (PORCINE) PER 1000 UNITS: Performed by: STUDENT IN AN ORGANIZED HEALTH CARE EDUCATION/TRAINING PROGRAM

## 2025-01-23 PROCEDURE — 99232 SBSQ HOSP IP/OBS MODERATE 35: CPT | Performed by: INTERNAL MEDICINE

## 2025-01-23 PROCEDURE — 63710000001 INSULIN LISPRO (HUMAN) PER 5 UNITS: Performed by: FAMILY MEDICINE

## 2025-01-23 RX ORDER — LEVOTHYROXINE SODIUM 150 UG/1
150 TABLET ORAL DAILY
Status: DISCONTINUED | OUTPATIENT
Start: 2025-01-23 | End: 2025-01-24 | Stop reason: HOSPADM

## 2025-01-23 RX ORDER — METOPROLOL TARTRATE 25 MG/1
25 TABLET, FILM COATED ORAL DAILY
Status: DISCONTINUED | OUTPATIENT
Start: 2025-01-23 | End: 2025-01-23

## 2025-01-23 RX ORDER — PANTOPRAZOLE SODIUM 40 MG/1
40 TABLET, DELAYED RELEASE ORAL DAILY
Status: DISCONTINUED | OUTPATIENT
Start: 2025-01-23 | End: 2025-01-24 | Stop reason: HOSPADM

## 2025-01-23 RX ORDER — SPIRONOLACTONE 25 MG/1
25 TABLET ORAL DAILY
Status: DISCONTINUED | OUTPATIENT
Start: 2025-01-23 | End: 2025-01-24 | Stop reason: HOSPADM

## 2025-01-23 RX ORDER — QUETIAPINE FUMARATE 25 MG/1
12.5 TABLET, FILM COATED ORAL ONCE
Status: COMPLETED | OUTPATIENT
Start: 2025-01-23 | End: 2025-01-23

## 2025-01-23 RX ADMIN — INSULIN LISPRO 3 UNITS: 100 INJECTION, SOLUTION INTRAVENOUS; SUBCUTANEOUS at 20:32

## 2025-01-23 RX ADMIN — SERTRALINE HYDROCHLORIDE 50 MG: 50 TABLET ORAL at 15:50

## 2025-01-23 RX ADMIN — INSULIN LISPRO 2 UNITS: 100 INJECTION, SOLUTION INTRAVENOUS; SUBCUTANEOUS at 17:47

## 2025-01-23 RX ADMIN — HEPARIN SODIUM 5000 UNITS: 5000 INJECTION INTRAVENOUS; SUBCUTANEOUS at 10:55

## 2025-01-23 RX ADMIN — ATORVASTATIN CALCIUM 80 MG: 40 TABLET, FILM COATED ORAL at 20:32

## 2025-01-23 RX ADMIN — Medication 2.5 MG: at 02:33

## 2025-01-23 RX ADMIN — SPIRONOLACTONE 25 MG: 25 TABLET ORAL at 15:50

## 2025-01-23 RX ADMIN — METOPROLOL SUCCINATE 25 MG: 25 TABLET, EXTENDED RELEASE ORAL at 11:03

## 2025-01-23 RX ADMIN — SACUBITRIL AND VALSARTAN 1 TABLET: 49; 51 TABLET, FILM COATED ORAL at 20:32

## 2025-01-23 RX ADMIN — TICAGRELOR 90 MG: 90 TABLET ORAL at 10:51

## 2025-01-23 RX ADMIN — QUETIAPINE FUMARATE 12.5 MG: 25 TABLET ORAL at 02:33

## 2025-01-23 RX ADMIN — Medication 1 APPLICATION: at 11:02

## 2025-01-23 RX ADMIN — SACUBITRIL AND VALSARTAN 1 TABLET: 49; 51 TABLET, FILM COATED ORAL at 10:54

## 2025-01-23 RX ADMIN — EMPAGLIFLOZIN 25 MG: 25 TABLET, FILM COATED ORAL at 15:50

## 2025-01-23 RX ADMIN — TICAGRELOR 90 MG: 90 TABLET ORAL at 20:32

## 2025-01-23 RX ADMIN — Medication 1 APPLICATION: at 20:32

## 2025-01-23 RX ADMIN — Medication 2.5 MG: at 22:35

## 2025-01-23 RX ADMIN — LEVOTHYROXINE SODIUM 150 MCG: 0.15 TABLET ORAL at 15:50

## 2025-01-23 RX ADMIN — PANTOPRAZOLE SODIUM 40 MG: 40 TABLET, DELAYED RELEASE ORAL at 15:50

## 2025-01-23 RX ADMIN — HEPARIN SODIUM 5000 UNITS: 5000 INJECTION INTRAVENOUS; SUBCUTANEOUS at 20:31

## 2025-01-23 RX ADMIN — ASPIRIN 81 MG CHEWABLE TABLET 81 MG: 81 TABLET CHEWABLE at 11:02

## 2025-01-23 NOTE — PAYOR COMM NOTE
"Roselia Rossi (64 y.o. Female)       Date of Birth   1960    Social Security Number       Address   [address] [place] [address]    Home Phone       MRN   [id number]       Ad[employer]   None    Marital Status   Single                            Admission Date   1/17/25    Admission Type   Urgent    Admitting Provider   Yecenia Murray MD    Attending Provider   Yecenia Murray MD    Department, Room/Bed   [address], S325/1       Discharge Date       Discharge Disposition       Discharge Destination                                 Attending Provider: Yecenia Murray MD    Allergies: No Known Allergies    Isolation: None   Infection: None   Code Status: CPR    Ht: 165.1 cm (65\")   Wt: 96.2 kg (212 lb)    Admission Cmt: None   Principal Problem: Stroke-like symptoms [R29.90]                   Active Insurance as of 1/17/2025       Primary Coverage       Payor Plan Insurance Group Employer/Plan Group    ANTHEM BLUE CROSS ANTHEM BLUE CROSS BLUE SHIELD PPO [id number]       Payor Plan Address Payor Plan Phone Number Payor Plan Fax Number Effective Dates    [address] [phone]  1/1/2021 - None Entered    [facility] [id number]         Subscriber Name Subscriber Birth Date Member ID       ROSELIA ROSSI 1960 [id number]                     Emergency Contacts        (Rel.) Home Phone Work Phone Mobile Phone    Rhina Zavala (Daughter) [phone] -- --    Ginette Santos (Sister) -- -- [phone]              [place]: NPI [id number] Tax ID [id number]  Insurance Information                  ANTHEM BLUE CROSS/ANTHEM BLUE CROSS BLUE SHIELD PPO Phone: [phone]    Subscriber: Enid Roselia ARELLANO Subscriber#: [id number]    Group#: [id number] Precert#: [id number]    Authorization#: [id number] Effective Date: --          Current Facility-Administered Medications   Medication Dose Route Frequency Provider Last Rate Last Admin    " acetaminophen (TYLENOL) tablet 650 mg  650 mg Oral Q6H PRN Deb Chatman RITA II, DO   650 mg at 01/19/25 0829    aspirin chewable tablet 81 mg  81 mg Oral Daily Ghanim-Moustafa, May, APRN   81 mg at 01/23/25 1102    Or    aspirin suppository 300 mg  300 mg Rectal Daily Ghanim-Moustafa, May, APRN        atorvastatin (LIPITOR) tablet 80 mg  80 mg Oral Nightly Ghanim-Moustafa, May, APRN   80 mg at 01/22/25 2039    sennosides-docusate (PERICOLACE) 8.6-50 MG per tablet 2 tablet  2 tablet Oral BID PRN Nishi Hsieh MD        And    polyethylene glycol (MIRALAX) packet 17 g  17 g Oral Daily PRN Nishi Hsieh MD        And    bisacodyl (DULCOLAX) EC tablet 5 mg  5 mg Oral Daily PRN Nishi Hsieh MD   5 mg at 01/22/25 2039    And    bisacodyl (DULCOLAX) suppository 10 mg  10 mg Rectal Daily PRN Nishi Hsieh MD        Calcium Replacement - Follow Nurse / BPA Driven Protocol   Not Applicable PRN Nishi Hsieh MD        castor oil-balsam peru (VENELEX) ointment 1 Application  1 Application Topical Q12H CompaDeb sanz II, DO   1 Application at 01/23/25 1102    dextrose (D50W) (25 g/50 mL) IV injection 25 g  25 g Intravenous Q15 Min PRN Nishi Hsieh MD        dextrose (GLUTOSE) oral gel 15 g  15 g Oral Q15 Min PRN Nishi Hsieh MD        glucagon (GLUCAGEN) injection 1 mg  1 mg Intramuscular Q15 Min PRN Nishi Hsieh MD        heparin (porcine) 5000 UNIT/ML injection 5,000 Units  5,000 Units Subcutaneous Q12H Maria Isabel Young MD   5,000 Units at 01/23/25 1055    Insulin Lispro (humaLOG) injection 2-9 Units  2-9 Units Subcutaneous 4x Daily AC & at Bedtime Nishi Hsieh MD   4 Units at 01/22/25 2051    ipratropium-albuterol (DUO-NEB) nebulizer solution 3 mL  3 mL Nebulization Q4H PRN Yecenia Murray MD        labetalol (NORMODYNE,TRANDATE) injection 10 mg  10 mg Intravenous Once Deb Chatman II, DO        Lidocaine 4 % 2 patch  2 patch Transdermal Q24H Una Smallwood, APRN    2 patch at 01/22/25 0204    Magnesium Standard Dose Replacement - Follow Nurse / BPA Driven Protocol   Not Applicable PRN Nishi Hsieh MD        melatonin tablet 2.5 mg  2.5 mg Oral Nightly PRN Nishi Hsieh MD   2.5 mg at 01/23/25 0233    metoprolol succinate XL (TOPROL-XL) 24 hr tablet 25 mg  25 mg Oral Q24H CompaDeb M II, DO   25 mg at 01/23/25 1103    ondansetron (ZOFRAN) injection 4 mg  4 mg Intravenous Q6H PRN Nishi Hsieh MD        Pharmacy Consult - MTM   Not Applicable Daily Love Painter, [facility]        polyvinyl alcohol (LIQUIFILM) 1.4 % ophthalmic solution 2 drop  2 drop Both Eyes Q3H PRN Ismael Gomez MD   2 drop at 01/22/25 1548    Potassium Replacement - Follow Nurse / BPA Driven Protocol   Not Applicable PRN Nishi Hsieh MD        sacubitril-valsartan (ENTRESTO) 49-51 MG tablet 1 tablet  1 tablet Oral Q12H Deb Chatman M II, DO   1 tablet at 01/23/25 1054    sodium chloride 0.9 % flush 10 mL  10 mL Intravenous Q12H Ghanim-Moustafa, May, APRN   10 mL at 01/20/25 2208    sodium chloride 0.9 % flush 10 mL  10 mL Intravenous PRN Ghanim-Moustafa, May, APRN        sodium chloride 0.9 % flush 10 mL  10 mL Intravenous Q12H Nishi Hsieh MD   10 mL at 01/22/25 0156    sodium chloride 0.9 % flush 10 mL  10 mL Intravenous PRN Nishi Hsieh MD        sodium chloride 0.9 % infusion 40 mL  40 mL Intravenous PRN Ghanim-Moustafa, May, APRN        sodium chloride 0.9 % infusion 40 mL  40 mL Intravenous PRN Nishi Hsieh MD        ticagrelor (BRILINTA) tablet 90 mg  90 mg Oral BID Maria Isabel Young MD   90 mg at 01/23/25 1051     Lab Results (last 24 hours)       Procedure Component Value Units Date/Time    POC Glucose Once [[id number]]  (Normal) Collected: 01/23/25 1120    Specimen: Blood Updated: 01/23/25 1124     Glucose 129 mg/dL     POC Glucose Once [[id number]]  (Normal) Collected: 01/23/25 0734    Specimen: Blood Updated: 01/23/25 0736     Glucose 119 mg/dL      POC Glucose Once [[id number]]  (Abnormal) Collected: 01/22/25 2027    Specimen: Blood Updated: 01/22/25 2028     Glucose 278 mg/dL     Vitamin B1, Whole Blood [[id number]] Collected: 01/18/25 0000    Specimen: Blood Updated: 01/22/25 1708     Vitamin B1, Whole Blood 122.7 nmol/L     Narrative:      Test(s) 121188-Vit. B1, Whole Blood  was developed and its performance characteristics determined  by [facility]. It has not been cleared or approved by the Food  and Drug Administration.  Performed at:  [address]  [id number]  : Ashley Dahl MD, Phone:  [phone]    Potassium [[id number]]  (Normal) Collected: 01/22/25 1604    Specimen: Blood Updated: 01/22/25 1706     Potassium 4.1 mmol/L      Comment: Slight hemolysis detected by analyzer. Result may be falsely elevated.       POC Glucose Once [[id number]]  (Abnormal) Collected: 01/22/25 1627    Specimen: Blood Updated: 01/22/25 1628     Glucose 134 mg/dL           Imaging Results (Last 24 Hours)       Procedure Component Value Units Date/Time    XR Chest 1 View [[id number]] Collected: 01/22/25 2125     Updated: 01/22/25 2131    Narrative:      XR CHEST 1 VW    Date of Exam: 1/22/2025 5:23 PM EST    Indication: f/u    Comparison: 1/22/2025    Findings:  Heart size stable. Significant interval improvement in aeration of the lungs and decreased prominence of the pulmonary vasculature. Persistent airspace disease in the left lung base and suspected small left pleural effusion that appears smaller      Impression:      Impression:  There has been marked improvement in appearance of pulmonary edema. There is persistent airspace disease in the left lung base and suspected small left pleural effusion that appears smaller than the previous exam      Electronically Signed: Payam Rodgers    1/22/2025 9:27 PM EST    Workstation ID: [id number]          Orders (last 24 hrs)        Start     Ordered    01/23/25 1125  POC Glucose Once   PROCEDURE ONCE        Comments: Complete no more than 45 minutes prior to patient eating      01/23/25 1120    01/23/25 0737  POC Glucose Once  PROCEDURE ONCE        Comments: Complete no more than 45 minutes prior to patient eating      01/23/25 0734    01/23/25 0600  CBC & Differential  Morning Draw         01/22/25 1620    01/23/25 0600  Comprehensive Metabolic Panel  Morning Draw         01/22/25 1620    01/23/25 0600  CBC Auto Differential  PROCEDURE ONCE         01/22/25 2202    01/23/25 0315  QUEtiapine (SEROquel) tablet 12.5 mg  Once         01/23/25 0228    01/22/25 2029  POC Glucose Once  PROCEDURE ONCE        Comments: Complete no more than 45 minutes prior to patient eating      01/22/25 2027    01/22/25 1629  POC Glucose Once  PROCEDURE ONCE        Comments: Complete no more than 45 minutes prior to patient eating      01/22/25 1627    01/22/25 1621  XR Chest 1 View  1 Time Imaging         01/22/25 1620    01/22/25 1441  Potassium  Timed         01/22/25 0542    01/22/25 1030  ipratropium-albuterol (DUO-NEB) nebulizer solution 3 mL  Every 4 Hours PRN         01/22/25 1028    01/22/25 0630  potassium chloride (KLOR-CON M20) CR tablet 40 mEq  Every 4 Hours         01/22/25 0542    01/22/25 0200  Lidocaine 4 % 2 patch  Every 24 Hours Scheduled         01/22/25 0154    01/21/25 1322  polyvinyl alcohol (LIQUIFILM) 1.4 % ophthalmic solution 2 drop  Every 3 Hours PRN         01/21/25 1322    01/21/25 0000  Ambulatory Referral to [facility]        Comments: Patient has a Holter monitor in place and is discharging to rehab.  The patient will need a 30 day monitor/loop recorder.    01/21/25 1304    01/21/25 0000  Ambulatory Referral to Neurology         01/21/25 1306    01/20/25 2100  ticagrelor (BRILINTA) tablet 90 mg  2 Times Daily         01/20/25 0915    01/20/25 2100  castor oil-balsam peru (VENELEX) ointment 1 Application  Every 12 Hours Scheduled         01/20/25 1656     "01/20/25 1800  Wound Care  2 Times Daily       01/20/25 1656    01/20/25 1615  Pharmacy Consult - MTM  Daily         01/20/25 1518    01/18/25 2100  heparin (porcine) 5000 UNIT/ML injection 5,000 Units  Every 12 Hours Scheduled         01/18/25 1551    01/18/25 2100  sacubitril-valsartan (ENTRESTO) 49-51 MG tablet 1 tablet  Every 12 Hours Scheduled         01/18/25 1612    01/18/25 1700  metoprolol succinate XL (TOPROL-XL) 24 hr tablet 25 mg  Every 24 Hours Scheduled         01/18/25 1612    01/18/25 1700  labetalol (NORMODYNE,TRANDATE) injection 10 mg  Once         01/18/25 1613    01/18/25 1126  acetaminophen (TYLENOL) tablet 650 mg  Every 6 Hours PRN         01/18/25 1126    01/18/25 0900  aspirin chewable tablet 81 mg  Daily        Placed in \"Or\" Linked Group    01/17/25 2222 01/18/25 0900  aspirin suppository 300 mg  Daily        Placed in \"Or\" Linked Group    01/17/25 2222    01/18/25 0800  Oral Care  2 Times Daily       01/17/25 2259    01/18/25 0730  Insulin Lispro (humaLOG) injection 2-9 Units  4 Times Daily Before Meals & Nightly         01/18/25 0311 01/18/25 0700  POC Glucose 4x Daily Before Meals & at Bedtime  4 Times Daily Before Meals & at Bedtime      Comments: Complete no more than 45 minutes prior to patient eating      01/18/25 0311 01/18/25 0311  dextrose (GLUTOSE) oral gel 15 g  Every 15 Minutes PRN         01/18/25 0311    01/18/25 0311  dextrose (D50W) (25 g/50 mL) IV injection 25 g  Every 15 Minutes PRN         01/18/25 0311    01/18/25 0311  glucagon (GLUCAGEN) injection 1 mg  Every 15 Minutes PRN         01/18/25 0311    01/18/25 0000  Intake and Output  Every 4 Hours       01/17/25 2222 01/18/25 0000  POC Glucose Q6H  Every 6 Hours,   Status:  Canceled      Comments: May Discontinue After 2 Consecutive Readings Less Than 140Notify Provider if 2 Readings Greater Than 140      01/17/25 2222    01/18/25 0000  Vital Signs  Every 4 Hours       01/17/25 2259    01/17/25 2345  sodium " "chloride 0.9 % flush 10 mL  Every 12 Hours Scheduled         01/17/25 2259 01/17/25 2315  sodium chloride 0.9 % flush 10 mL  Every 12 Hours Scheduled         01/17/25 2222 01/17/25 2315  atorvastatin (LIPITOR) tablet 80 mg  Nightly         01/17/25 2222 01/17/25 2249  melatonin tablet 2.5 mg  Nightly PRN         01/17/25 2259 01/17/25 2249  Intake & Output  Every Shift       01/17/25 2259 01/17/25 2248  sodium chloride 0.9 % flush 10 mL  As Needed         01/17/25 2259 01/17/25 2248  sodium chloride 0.9 % infusion 40 mL  As Needed         01/17/25 2259 01/17/25 2248  sennosides-docusate (PERICOLACE) 8.6-50 MG per tablet 2 tablet  2 Times Daily PRN        Placed in \"And\" Linked Group    01/17/25 2259 01/17/25 2248  polyethylene glycol (MIRALAX) packet 17 g  Daily PRN        Placed in \"And\" Linked Group    01/17/25 2259 01/17/25 2248  bisacodyl (DULCOLAX) EC tablet 5 mg  Daily PRN        Placed in \"And\" Linked Group    01/17/25 2259 01/17/25 2248  bisacodyl (DULCOLAX) suppository 10 mg  Daily PRN        Placed in \"And\" Linked Group    01/17/25 2259 01/17/25 2248  Potassium Replacement - Follow Nurse / BPA Driven Protocol  As Needed         01/17/25 2259 01/17/25 2248  Magnesium Standard Dose Replacement - Follow Nurse / BPA Driven Protocol  As Needed         01/17/25 2259 01/17/25 2248  Calcium Replacement - Follow Nurse / BPA Driven Protocol  As Needed         01/17/25 2259 01/17/25 2248  ondansetron (ZOFRAN) injection 4 mg  Every 6 Hours PRN         01/17/25 2259 01/17/25 2222  sodium chloride 0.9 % flush 10 mL  As Needed         01/17/25 2222 01/17/25 2222  sodium chloride 0.9 % infusion 40 mL  As Needed         01/17/25 2222    Unscheduled  Order CT Head Without Contrast for Neurological Decline  As Needed       01/17/25 2229    Unscheduled  Follow Hypoglycemia Standing Orders For Blood Glucose <70 & Notify Provider of Treatment  As Needed      Comments: Follow " Hypoglycemia Orders As Outlined in Process Instructions (Open Order Report to View Full Instructions)  Notify Provider Any Time Hypoglycemia Treatment is Administered    01/18/25 0311                  Operative/Procedure Notes (all)    No notes of this type exist for this encounter.       Physician Progress Notes (last 24 hours)  Notes from 01/22/25 1156 through 01/23/25 1156   No notes of this type exist for this encounter.       Consult Notes (last 24 hours)  Notes from 01/22/25 1156 through 01/23/25 1156   No notes of this type exist for this encounter.

## 2025-01-23 NOTE — CASE MANAGEMENT/SOCIAL WORK
Continued Stay Note  [facility]     Patient Name: Roselia Rossi  MRN: [id number]  Today's Date: 1/23/2025    Admit Date: 1/17/2025    Plan: [facility]   Discharge Plan       Row Name 01/23/25 1240       Plan    Plan [facility]    Patient/Family in Agreement with Plan yes    Plan Comments Discussed Ms. Rossi in MDR. Spoke with Jane with [facility] and pre-cert is still pending from her [employer] insurance for the rehab at [facility]. CM will continue to follow up.    Final Discharge Disposition Code 62 - inpatient rehab facility                   Discharge Codes    No documentation.                 Expected Discharge Date and Time       Expected Discharge Date Expected Discharge Time    Jan 24, 2025               Dannielle Samuel RN

## 2025-01-23 NOTE — PLAN OF CARE
Goal Outcome Evaluation:  Plan of Care Reviewed With: patient        Progress: improving       Anticipated Discharge Disposition (SLP): inpatient rehabilitation facility             Treatment Assessment (SLP): continued, cognitive-linguistic disorder (01/23/25 1520)  Treatment Assessment Comments (SLP): Pt continues to present with cognitive-linguistic deficits. SLP unsure of odd behaviors and intermittent poor cooperation related to Telida status, attention deficits, vs choice of behavior. (01/23/25 1520)  Plan for Continued Treatment (SLP): continue treatment per plan of care (01/23/25 1520)

## 2025-01-23 NOTE — THERAPY TREATMENT NOTE
Acute Care - Speech Language Pathology Treatment Note  [facility]     Patient Name: Roselia Rossi  : 1960  MRN: [id number]  Today's Date: 2025               Admit Date: 2025     Visit Dx:    ICD-10-CM ICD-9-CM   1. Cognitive communication deficit  R41.841 799.52   2. Dysphagia, unspecified type  R13.10 787.20   3. Palpitations  R00.2 785.1   4. Cryptogenic stroke  I63.9 434.91   5. Cerebrovascular accident (CVA), unspecified mechanism  I63.9 434.91     Patient Active Problem List   Diagnosis    Abnormal stress test    Type 2 diabetes mellitus with hyperglycemia    Stroke-like symptoms     Past Medical History:   Diagnosis Date    Coronary artery disease     Diabetes mellitus     Disease of thyroid gland     Hyperlipidemia     Hypertension      Past Surgical History:   Procedure Laterality Date    CARDIAC CATHETERIZATION N/A 2017    Procedure: Left Heart Cath;  Surgeon: Tommy Haider MD;  Location:  [facility];  Service:     CARDIAC CATHETERIZATION N/A 2021    Procedure: Left Heart Cath;  Surgeon: Tommy Haider MD;  Location:  [facility];  Service: Cardiovascular;  Laterality: N/A;    CHOLECYSTECTOMY      COLONOSCOPY      CORONARY ARTERY BYPASS GRAFT      TUBAL ABDOMINAL LIGATION         SLP Recommendation and Plan                    Anticipated Discharge Disposition (SLP): inpatient rehabilitation facility (25 1520)        Therapy Frequency (SLP SLC): 3 days per week (25 1520)  Predicted Duration Therapy Intervention (Days): 1 week (25 1520)        Daily Summary of Progress (SLP): progress towards functional goals is fair (25 1520)           Treatment Assessment (SLP): continued, cognitive-linguistic disorder (25 1520)  Treatment Assessment Comments (SLP): Pt continues to present with cognitive-linguistic deficits. SLP unsure of odd behaviors and intermittent poor cooperation related to [place] status,  attention deficits, vs choice of behavior. (01/23/25 1520)  Plan for Continued Treatment (SLP): continue treatment per plan of care (01/23/25 1520)  Progress: improving (01/23/25 1641)      SLP EVALUATION (Last 72 Hours)       SLP SLC Evaluation       Row Name 01/23/25 1520                   Communication Assessment/Intervention    Document Type therapy note (daily note)  -MM        Subjective Information no complaints  -MM        Patient Observations alert;cooperative  -MM        Patient/Family/Caregiver Comments/Observations No family present  -MM        Patient Effort adequate  -MM        Symptoms Noted During/After Treatment none  -MM           General Information    Patient Profile Reviewed yes  -MM        Pertinent History Of Current Problem See initial eval  -MM           Pain    Pretreatment Pain Rating 0/10 - no pain  -MM        Posttreatment Pain Rating 0/10 - no pain  -MM           SLP Treatment Clinical Impressions    Treatment Assessment (SLP) continued;cognitive-linguistic disorder  -MM        Treatment Assessment Comments (SLP) Pt continues to present with cognitive-linguistic deficits. SLP unsure of odd behaviors and intermittent poor cooperation related to [place] status, attention deficits, vs choice of behavior.  -MM        Daily Summary of Progress (SLP) progress towards functional goals is fair  -MM        Plan for Continued Treatment (SLP) continue treatment per plan of care  -MM        Care Plan Review care plan/treatment goals reviewed;risks/benefits reviewed;current/potential barriers reviewed;patient/other agree to care plan  -MM           Recommendations    Therapy Frequency (SLP SLC) 3 days per week  -MM        Predicted Duration Therapy Intervention (Days) 1 week  -MM        Anticipated Discharge Disposition (SLP) inpatient rehabilitation facility  -MM                  User Key  (r) = Recorded By, (t) = Taken By, (c) = Cosigned By      Initials Name Effective Dates    MM Mariana Moise, MS  "CCC-SLP 08/30/24 -                        EDUCATION  The patient has been educated in the following areas:     Communication Impairment.           SLP GOALS       Row Name 01/23/25 1520 01/21/25 1440          (LTG) Patient will demonstrate functional swallow for    Diet Texture (Demonstrate functional swallow) -- regular textures  -     Liquid viscosity (Demonstrate functional swallow) -- thin liquids  -     [place] (Demonstrate functional swallow) -- independently (over 90% accuracy)  -     Time Frame (Demonstrate functional swallow) -- 1 week  -     Progress/Outcomes (Demonstrate functional swallow) -- goal no longer appropriate  -        (STG) Patient will tolerate trials of    Consistencies Trialed (Tolerate trials) -- soft to chew (chopped) textures;thin liquids  -     Desired Outcome (Tolerate trials) -- without signs/symptoms of aspiration;with adequate oral prep/transit/clearance  -     [place] (Tolerate trials) -- independently (over 90% accuracy)  -     Time Frame (Tolerate trials) -- 1 week  -     Progress/Outcomes (Tolerate trials) -- other (see comments)  -     Comment (Tolerate trials) -- No overt s/s of aspiration w/ regular solid or thin liquid trials. Prolonged oral manipulation/mastication of regular solid trial; however no significant residue. Pt requesting to cont soft/chopped solid diet, stating she is on \"soft solid\" diet @ baseline  -        (STG) Patient will tolerate therapeutic trials of    Consistencies Trialed (Tolerate therapeutic trials) -- regular textures  -     Desired Outcome (Tolerate therapeutic trials) -- with adequate oral prep/transit/clearance  -     [place] (Tolerate therapeutic trials) -- independently (over 90% accuracy)  -     Time Frame (Tolerate therapeutic trials) -- 1 week  -     Progress/Outcomes (Tolerate therapeutic trials) -- goal no longer appropriate  -        Patient will demonstrate functional language skills for " return to discharge environment     [place] with minimal cues  -MM with minimal cues  -MH     Time frame 1 week  -MM 1 week  -MH     Progress/Outcomes continuing progress toward goal  -MM continuing progress toward goal  -MH        SLP Diagnostic Treatment     Patient will participate in further assessment in the following areas clarification of baseline cognitive communication status  -MM clarification of baseline cognitive communication status  -MH     Time Frame (Diagnostic) 1 week  -MM 1 week  -MH     Progress/Outcomes (Additional Goal 1, SLP) goal ongoing  -MM goal ongoing  -MH     Comment (Diagnostic) -- Completed cog dx tx, goals added. Baseline unknown & no family present to clarify  -MH        Follow Directions Goal 2 (SLP)    Improve Ability to Follow Directions Goal 1 (SLP) multistep commands;80%;with minimal cues (75-90%)  -MM multistep commands;80%;with minimal cues (75-90%)  -     Time Frame (Follow Directions Goal 1, SLP) 1 week  -MM 1 week  -MH     Progress (Ability to Follow Directions Goal 1, SLP) 60%;with minimal cues (75-90%)  -MM 60%;with moderate cues (50-74%)  -     Progress/Outcomes (Follow Directions Goal 1, SLP) continuing progress toward goal  -MM continuing progress toward goal  -MH     Comment (Follow Directions Goal 1, SLP) -- Attention/memory deficits impacting  -MH        Comprehend Narrative Discourse Goal 1 (SLP)    Improve Comprehension of Narrative Discourse Goal 1 (SLP) respond appropriately to complex conversational statements;respond appropriately to complex conversational questions;80%;with minimal cues (75-90%)  -MM respond appropriately to complex conversational statements;respond appropriately to complex conversational questions;80%;with minimal cues (75-90%)  -     Time Frame (Comprehend Narrative Discourse Goal 1, SLP) 1 week  -MM 1 week  -MH     Progress (Ability to Comprehend Narrative Discourse Goal 1, SLP) 60%;with minimal cues (75-90%)  -MM 60%;with  moderate cues (50-74%)  -MH     Progress/Outcomes (Comprehend Narrative Discourse Goal 1, SLP) continuing progress toward goal  -MM continuing progress toward goal  -MH        Comprehension at Phrase and Sentence Level Goal 1 (SLP)    Improve Reading Comprehension at Phrase and Sentence Level Goal 1 (SLP) sentence;answer simple written y/n questions;answer complex written y/n questions;follow simple written directions;follow complex written directions;80%;with minimal cues (75-90%)  -MM sentence;answer simple written y/n questions;answer complex written y/n questions;follow simple written directions;follow complex written directions;80%;with minimal cues (75-90%)  -MH     Time Frame (Reading Comprehension at Phrase and Sentence Level Goal 1, SLP) 1 week  -MM 1 week  -MH     Progress (Reading Comprehension at Phrase and Sentence Level Goal 1, SLP) 40%;with minimal cues (75-90%)  -MM --     Progress/Outcomes (Reading Comprehension at Phrase and Sentence Level Goal 1, SLP) continuing progress toward goal  -MM goal ongoing  -MH        Attention Goal 1 (SLP)    Improve Attention by Goal 1 (SLP) complete selective attention task;complete sustained attention task;80%;with minimal cues (75-90%)  -MM complete selective attention task;complete sustained attention task;80%;with minimal cues (75-90%)  -     Time Frame (Attention Goal 1, SLP) 1 week  -MM 1 week  -MH     Progress (Attention Goal 1, SLP) 30%;with minimal cues (75-90%)  -MM --     Progress/Outcomes (Attention Goal 1, SLP) continuing progress toward goal  -MM new goal  -MH     Comment (Attention Goal 1, SLP) Poor attention to task  -MM --        Memory Skills Goal 1 (SLP)    Improve Memory Skills Through Goal 1 (SLP) recalling related word lists immediately;recalling unrelated word lists immediately;recall details of the day;80%;with minimal cues (75-90%)  -MM recalling related word lists immediately;recalling unrelated word lists immediately;recall details of the  "day;80%;with minimal cues (75-90%)  -     Time Frame (Memory Skills Goal 1, SLP) 1 week  -MM 1 week  -MH     Progress/Outcomes (Memory Skills Goal 1, SLP) goal ongoing  -MM new goal  -        Organizational Skills Goal 1 (SLP)    Improve Thought Organization Through Goal 1 (SLP) completing a divergent naming task;completing a convergent naming task;completing a verbal sequencing task;80%;with minimal cues (75-90%)  -MM completing a divergent naming task;completing a convergent naming task;completing a verbal sequencing task;80%;with minimal cues (75-90%)  -     Time Frame (Thought Organization Skills Goal 1, SLP) 1 week  -MM 1 week  -MH     Progress (Thought Organization Skills Goal 1, SLP) 60%;with minimal cues (75-90%)  -MM --     Progress/Outcomes (Thought Organization Skills Goal 1, SLP) continuing progress toward goal  -MM new goal  -     Comment (Thought Organization Skills Goal 1, SLP) Pt completed divergent task with an average of 7 items named.  -MM --        Functional Problem Solving Skills Goal 1 (SLP)    Improve Problem Solving Through Goal 1 (SLP) answer questions about ADL problems;determine solutions to simple ADL/safety problems;answer \"what if\" questions;80%;with minimal cues (75-90%)  -MM answer questions about ADL problems;determine solutions to simple ADL/safety problems;answer \"what if\" questions;80%;with minimal cues (75-90%)  -     Time Frame (Problem Solving Goal 1, SLP) 1 week  -MM 1 week  -     Progress/Outcomes (Problem Solving Goal 1, SLP) goal ongoing  -MM new goal  -               User Key  (r) = Recorded By, (t) = Taken By, (c) = Cosigned By      Initials Name Provider Type     Elsa Ortiz MS CCC-SLP Speech and Language Pathologist    MM Mariana Moise MS CCC-SLP Speech and Language Pathologist                              Time Calculation:      Time Calculation- SLP       Row Name 01/23/25 6197             Time Calculation- SLP    SLP Start Time 1520  -MM      " SLP Received On 01/23/25  -MM         Untimed Charges    15833-XN Treatment/ST Modification Prosth Aug Alter  40  -MM         Total Minutes    Untimed Charges Total Minutes 40  -MM       Total Minutes 40  -MM                User Key  (r) = Recorded By, (t) = Taken By, (c) = Cosigned By      Initials Name Provider Type    Mariana Paiz, MS CCC-SLP Speech and Language Pathologist                    Therapy Charges for Today       Code Description Service Date Service Provider Modifiers Qty    [id number]  ST TREATMENT SPEECH 3 1/23/2025 Mariaan Moise MS CCC-SLP GN 1                       Mariana Moise MS CCC-SLP  1/23/2025

## 2025-01-23 NOTE — PROGRESS NOTES
[facility] Medicine Services  PROGRESS NOTE    Patient Name: Roselia Rossi  : 1960  MRN: [id number]    Date of Admission: 2025  Primary Care Physician: Leatha Grigsby MD    Subjective   Subjective     CC:   Follow-up for CVA    HPI:   Patient seen and examined in the morning.  Sitting comfortably in bed.  She ate her breakfast.      Objective   Objective     Vital Signs:   Temp:  [97.9 °F (36.6 °C)-98.6 °F (37 °C)] 98.1 °F (36.7 °C)  Heart Rate:  [62-76] 69  Resp:  [16-18] 18  BP: (109-168)/(73-96) 109/79     Physical Exam:  General: Comfortable, not in distress, conversant and cooperative  Head: Atraumatic and normocephalic  Eyes: No Icterus. No pallor  Ears:  Ears appear intact with no abnormalities noted  Throat: No oral lesions, no thrush  Neck: Supple, trachea midline  Lungs: Clear to auscultation bilaterally, equal air entry, no wheezing or crackles  Heart:  Normal S1 and S2, no murmur, no gallop, No JVD, no lower extremity swelling  Abdomen:  Soft, no tenderness, no organomegaly, normal bowel sounds, no organomegaly  Extremities: pulses equal bilaterally  Skin: No bleeding, bruising or rash, normal skin turgor and elasticity  Neurologic: Dense left hemiplegia  Psych: Alert and oriented x 3, normal mood    Results Reviewed:  LAB RESULTS:      Lab 25  1053 25  0442 25  0416 25  0000   WBC 7.95 7.96 8.62  --    HEMOGLOBIN 15.3 15.5 13.2  --    HEMATOCRIT 46.5 47.8* 41.2  --    PLATELETS 211 240 207  --    NEUTROS ABS 5.04 5.53 6.06  --    IMMATURE GRANS (ABS) 0.05 0.06* 0.03  --    LYMPHS ABS 1.81 1.26 1.47  --    MONOS ABS 0.64 0.59 0.66  --    EOS ABS 0.36 0.48* 0.36  --    MCV 88.9 90.0 90.9  --    LACTATE  --   --   --  1.0         Lab 25  1604 25  0323 25  1053 25  0442 25  0416   SODIUM  --  141 142 143 135*   POTASSIUM 4.1 3.5 3.9 4.1 4.5   CHLORIDE  --  104 107 108* 104   CO2  --  30.0* 24.0 24.0 22.0    ANION GAP  --  7.0 11.0 11.0 9.0   BUN  --  14 15 16 20   CREATININE  --  1.20* 1.09* 1.21* 1.47*   EGFR  --  50.0* 56.1* 49.5* 39.2*   GLUCOSE  --  95 152* 200* 168*   CALCIUM  --  9.1 9.0 9.2 8.9   HEMOGLOBIN A1C  --   --   --   --  12.00*         Lab 01/20/25  1053 01/19/25  0442 01/18/25  0416   TOTAL PROTEIN 5.0* 5.5* 5.1*   ALBUMIN 2.1* 2.3* 2.1*   GLOBULIN 2.9 3.2 3.0   ALT (SGPT) 11 13 14   AST (SGOT) 19 16 15   BILIRUBIN 0.3 0.3 0.2   ALK PHOS 107 113 101               Lab 01/18/25  0416   CHOLESTEROL 133   LDL CHOL 76   HDL CHOL 34*   TRIGLYCERIDES 129         Lab 01/18/25  0000   FOLATE 6.70   VITAMIN B 12 >2,000*         Brief Urine Lab Results  (Last result in the past 365 days)        Color   Clarity   Blood   Leuk Est   Nitrite   Protein   CREAT   Urine HCG        11/05/24 1519             97.2                 Microbiology Results Abnormal       None            XR Chest 1 View    Result Date: 1/22/2025  XR CHEST 1 VW Date of Exam: 1/22/2025 5:23 PM EST Indication: f/u Comparison: 1/22/2025 Findings: Heart size stable. Significant interval improvement in aeration of the lungs and decreased prominence of the pulmonary vasculature. Persistent airspace disease in the left lung base and suspected small left pleural effusion that appears smaller     Impression: Impression: There has been marked improvement in appearance of pulmonary edema. There is persistent airspace disease in the left lung base and suspected small left pleural effusion that appears smaller than the previous exam Electronically Signed: Payam Rodgers  1/22/2025 9:27 PM EST  Workstation ID: [id number]    CT Cervical Spine Without Contrast    Result Date: 1/22/2025  CT CERVICAL SPINE WO CONTRAST Date of Exam: 1/22/2025 2:11 AM EST Indication: Neck pain. Comparison: None available. Technique: Axial CT images were obtained of the cervical spine without contrast administration.  Reconstructed coronal and sagittal images were also obtained.  Automated exposure control and iterative construction methods were used. Findings: No acute fracture is identified. The craniocervical junction appears intact. The alignment is anatomic. The vertebral body heights appear normal. There are moderate discogenic changes at C5-6. The prevertebral soft tissues are unremarkable. C2-3: Mild disc bulge eccentric to the left. Mild right facet arthropathy. No spinal canal stenosis. Mild left neural foraminal stenosis. C3-4: Mild disc bulge. Moderate right and mild left facet arthropathy. Mild left uncovertebral hypertrophy. No spinal canal stenosis. Mild left neural foraminal stenosis. C4-5: Mild disc osteophyte complex eccentric to the left. Mild right and moderate left facet arthropathy. Mild right and moderate left uncovertebral hypertrophy. Mild spinal canal stenosis. Mild right and severe left neural foraminal stenosis. C5-6: Moderate disc osteophyte complex. Moderate bilateral facet arthropathy. Severe right and moderate left uncovertebral hypertrophy. Mild spinal canal stenosis. Severe right and moderate to severe left neural foraminal stenosis. C6-7: Mild bilateral facet arthropathy. Mild bilateral uncovertebral hypertrophy. No spinal canal stenosis. Mild bilateral neural foraminal stenosis. C7-T1: Mild right facet arthropathy. No spinal canal stenosis. No neural foraminal stenosis.     Impression: Impression: 1.No acute osseous process identified. 2.Multilevel degenerative changes of cervical spine as described above, most prominent at C4-5 and C5-6 where there is severe neural foraminal stenosis. Electronically Signed: Félix Cole MD  1/22/2025 8:48 AM EST  Workstation ID: [id number]     Results for orders placed during the hospital encounter of 01/17/25    Adult Transthoracic Echo Complete W/ Cont if Necessary Per Protocol (With Agitated Saline)    Interpretation Summary    Left ventricular systolic function is normal. Estimated left ventricular EF = 55%    Left  ventricular wall thickness is consistent with mild concentric hypertrophy.    Mild mitral valve stenosis is present. The mitral valve mean gradient is 6 mmHg.    Mild mitral regurgitation.    Trace tricuspid regurgitation.    Saline test results are negative for intracardiac shunting.    No cardiac source for embolus is seen.      Current medications:  Scheduled Meds:aspirin, 81 mg, Oral, Daily   Or  aspirin, 300 mg, Rectal, Daily  atorvastatin, 80 mg, Oral, Nightly  castor oil-balsam peru, 1 Application, Topical, Q12H  heparin (porcine), 5,000 Units, Subcutaneous, Q12H  insulin lispro, 2-9 Units, Subcutaneous, 4x Daily AC & at Bedtime  labetalol, 10 mg, Intravenous, Once  Lidocaine, 2 patch, Transdermal, Q24H  metoprolol succinate XL, 25 mg, Oral, Q24H  pharmacy consult - [place], , Not Applicable, Daily  sacubitril-valsartan, 1 tablet, Oral, Q12H  sodium chloride, 10 mL, Intravenous, Q12H  sodium chloride, 10 mL, Intravenous, Q12H  ticagrelor, 90 mg, Oral, BID      Continuous Infusions:   PRN Meds:.  acetaminophen    senna-docusate sodium **AND** polyethylene glycol **AND** bisacodyl **AND** bisacodyl    Calcium Replacement - Follow Nurse / BPA Driven Protocol    dextrose    dextrose    glucagon (human recombinant)    ipratropium-albuterol    Magnesium Standard Dose Replacement - Follow Nurse / BPA Driven Protocol    melatonin    ondansetron    polyvinyl alcohol    Potassium Replacement - Follow Nurse / BPA Driven Protocol    sodium chloride    sodium chloride    sodium chloride    sodium chloride    Assessment & Plan   Assessment & Plan     Active Hospital Problems    Diagnosis  POA    **Stroke-like symptoms [R29.90]  Yes      Resolved Hospital Problems   No resolved problems to display.     Summary:  Roselia Rossi is a 64-year-old female with a PMH significant for right hemispheric watershed strokes (2023, residual left hand weakness), uncontrolled HTN, uncontrolled HLD, CAD, CABG, CHF, uncontrolled T2DM, CKD,  and thyroid disease who presents to [facility] via EMS transfer from [facility] with complaints of multifocal right hemispheric infarcts.  Patient initially presented to OSH on 1/14/2025 with complaints of left-sided weakness.  She underwent an MRI on 1/14 that revealed multifocal right hemispheric areas of restricted diffusion.  She was started on DAPT with aspirin and Plavix as well as high-dose statin.  On 1/16/2025, she was noted to be more lethargic and generally weak.  She was unable to undergo repeat MRI and MRA secondary to agitation.  Repeat CT head per OSH MD with evolving infarcts and no hemorrhage.      Acute right hemispheric multifocal ischemic CVA  HTN  Possible etiology: Cardioembolic  Patient was on aspirin and Plavix at home.  Found to be Plavix unresponsive with P2 Y12 of more than 218.  Switched to Brilinta  Continue intensive statins 80 mg at bedtime  No evidence of telemetry.  Might benefit from event monitor upon discharge  My partner discussed with Dr. Young/neurology.  Patient with significant receptive aphasia and does not have capacity  Stroke team following  Continue PT and OT  CM working on acute rehab     Acute decompensated diastolic heart failure with pulmonary edema   Acute hypoxia, improved   HFpEF   CXR from 1/18/2025 with pulmonary edema  Echocardiogram with normal ejection fraction  Improved with diuresis  Repeat chest x-ray pending  Continue Entresto and metoprolol     Type 2 diabetes  A1c 12.0  Continue SSI     Hx of hypothyroidism:   Continue levothyroxine    Expected Discharge Location and Transportation: Rehab  Expected Discharge   Expected Discharge Date: 1/24/2025; Expected Discharge Time:      VTE Prophylaxis:  Pharmacologic & mechanical VTE prophylaxis orders are present.         AM-PAC 6 Clicks Score (PT): 10 (01/22/25 2010)    CODE STATUS:   Code Status and Medical Interventions: CPR (Attempt to Resuscitate); Full Support   Ordered at: 01/17/25 4304     Level Of  Support Discussed With:    Patient     Code Status (Patient has no pulse and is not breathing):    CPR (Attempt to Resuscitate)     Medical Interventions (Patient has pulse or is breathing):    Full Support       Yecenia Murray MD  01/23/25

## 2025-01-24 VITALS
RESPIRATION RATE: 16 BRPM | HEIGHT: 65 IN | WEIGHT: 212 LBS | OXYGEN SATURATION: 96 % | BODY MASS INDEX: 35.32 KG/M2 | HEART RATE: 60 BPM | DIASTOLIC BLOOD PRESSURE: 87 MMHG | TEMPERATURE: 97.9 F | SYSTOLIC BLOOD PRESSURE: 147 MMHG

## 2025-01-24 DIAGNOSIS — R00.2 PALPITATIONS: Primary | ICD-10-CM

## 2025-01-24 PROBLEM — I63.9 ACUTE CVA (CEREBROVASCULAR ACCIDENT): Status: ACTIVE | Noted: 2025-01-24

## 2025-01-24 LAB
ALBUMIN SERPL-MCNC: 2.4 G/DL (ref 3.5–5.2)
ALBUMIN/GLOB SERPL: 0.9 G/DL
ALP SERPL-CCNC: 105 U/L (ref 39–117)
ALT SERPL W P-5'-P-CCNC: 14 U/L (ref 1–33)
ANION GAP SERPL CALCULATED.3IONS-SCNC: 8 MMOL/L (ref 5–15)
AST SERPL-CCNC: 28 U/L (ref 1–32)
BASOPHILS # BLD AUTO: 0.05 10*3/MM3 (ref 0–0.2)
BASOPHILS NFR BLD AUTO: 0.6 % (ref 0–1.5)
BILIRUB SERPL-MCNC: 0.3 MG/DL (ref 0–1.2)
BUN SERPL-MCNC: 16 MG/DL (ref 8–23)
BUN/CREAT SERPL: 11.9 (ref 7–25)
CALCIUM SPEC-SCNC: 8.8 MG/DL (ref 8.6–10.5)
CHLORIDE SERPL-SCNC: 105 MMOL/L (ref 98–107)
CO2 SERPL-SCNC: 28 MMOL/L (ref 22–29)
CREAT SERPL-MCNC: 1.34 MG/DL (ref 0.57–1)
DEPRECATED RDW RBC AUTO: 44.9 FL (ref 37–54)
EGFRCR SERPLBLD CKD-EPI 2021: 43.8 ML/MIN/1.73
EOSINOPHIL # BLD AUTO: 0.37 10*3/MM3 (ref 0–0.4)
EOSINOPHIL NFR BLD AUTO: 4.5 % (ref 0.3–6.2)
ERYTHROCYTE [DISTWIDTH] IN BLOOD BY AUTOMATED COUNT: 13.9 % (ref 12.3–15.4)
GLOBULIN UR ELPH-MCNC: 2.8 GM/DL
GLUCOSE BLDC GLUCOMTR-MCNC: 100 MG/DL (ref 70–130)
GLUCOSE BLDC GLUCOMTR-MCNC: 124 MG/DL (ref 70–130)
GLUCOSE SERPL-MCNC: 150 MG/DL (ref 65–99)
HCT VFR BLD AUTO: 46.4 % (ref 34–46.6)
HGB BLD-MCNC: 15.3 G/DL (ref 12–15.9)
IMM GRANULOCYTES # BLD AUTO: 0.04 10*3/MM3 (ref 0–0.05)
IMM GRANULOCYTES NFR BLD AUTO: 0.5 % (ref 0–0.5)
LYMPHOCYTES # BLD AUTO: 1.51 10*3/MM3 (ref 0.7–3.1)
LYMPHOCYTES NFR BLD AUTO: 18.4 % (ref 19.6–45.3)
MAGNESIUM SERPL-MCNC: 1.7 MG/DL (ref 1.6–2.4)
MCH RBC QN AUTO: 29.5 PG (ref 26.6–33)
MCHC RBC AUTO-ENTMCNC: 33 G/DL (ref 31.5–35.7)
MCV RBC AUTO: 89.4 FL (ref 79–97)
MONOCYTES # BLD AUTO: 0.46 10*3/MM3 (ref 0.1–0.9)
MONOCYTES NFR BLD AUTO: 5.6 % (ref 5–12)
NEUTROPHILS NFR BLD AUTO: 5.79 10*3/MM3 (ref 1.7–7)
NEUTROPHILS NFR BLD AUTO: 70.4 % (ref 42.7–76)
NRBC BLD AUTO-RTO: 0 /100 WBC (ref 0–0.2)
PHOSPHATE SERPL-MCNC: 3.8 MG/DL (ref 2.5–4.5)
PLATELET # BLD AUTO: 178 10*3/MM3 (ref 140–450)
PMV BLD AUTO: 10.5 FL (ref 6–12)
POTASSIUM SERPL-SCNC: 3.8 MMOL/L (ref 3.5–5.2)
PROT SERPL-MCNC: 5.2 G/DL (ref 6–8.5)
RBC # BLD AUTO: 5.19 10*6/MM3 (ref 3.77–5.28)
SODIUM SERPL-SCNC: 141 MMOL/L (ref 136–145)
WBC NRBC COR # BLD AUTO: 8.22 10*3/MM3 (ref 3.4–10.8)

## 2025-01-24 PROCEDURE — 83735 ASSAY OF MAGNESIUM: CPT | Performed by: INTERNAL MEDICINE

## 2025-01-24 PROCEDURE — 80053 COMPREHEN METABOLIC PANEL: CPT | Performed by: INTERNAL MEDICINE

## 2025-01-24 PROCEDURE — 99238 HOSP IP/OBS DSCHRG MGMT 30/<: CPT | Performed by: NURSE PRACTITIONER

## 2025-01-24 PROCEDURE — 85025 COMPLETE CBC W/AUTO DIFF WBC: CPT | Performed by: INTERNAL MEDICINE

## 2025-01-24 PROCEDURE — 84100 ASSAY OF PHOSPHORUS: CPT | Performed by: INTERNAL MEDICINE

## 2025-01-24 PROCEDURE — 25010000002 HEPARIN (PORCINE) PER 1000 UNITS: Performed by: STUDENT IN AN ORGANIZED HEALTH CARE EDUCATION/TRAINING PROGRAM

## 2025-01-24 PROCEDURE — 82948 REAGENT STRIP/BLOOD GLUCOSE: CPT

## 2025-01-24 RX ORDER — ATORVASTATIN CALCIUM 80 MG/1
80 TABLET, FILM COATED ORAL NIGHTLY
Start: 2025-01-24

## 2025-01-24 RX ORDER — CASTOR OIL AND BALSAM, PERU 788; 87 MG/G; MG/G
1 OINTMENT TOPICAL EVERY 12 HOURS SCHEDULED
Start: 2025-01-24

## 2025-01-24 RX ORDER — LIDOCAINE 4 G/G
2 PATCH TOPICAL
Start: 2025-01-25

## 2025-01-24 RX ORDER — INSULIN LISPRO 100 [IU]/ML
2-9 INJECTION, SOLUTION INTRAVENOUS; SUBCUTANEOUS
Start: 2025-01-24

## 2025-01-24 RX ORDER — POLYVINYL ALCOHOL 14 MG/ML
2 SOLUTION/ DROPS OPHTHALMIC
Start: 2025-01-24

## 2025-01-24 RX ORDER — METOPROLOL SUCCINATE 25 MG/1
25 TABLET, EXTENDED RELEASE ORAL
Start: 2025-01-25

## 2025-01-24 RX ORDER — ACETAMINOPHEN 325 MG/1
650 TABLET ORAL EVERY 6 HOURS PRN
Start: 2025-01-24

## 2025-01-24 RX ADMIN — METOPROLOL SUCCINATE 25 MG: 25 TABLET, EXTENDED RELEASE ORAL at 09:52

## 2025-01-24 RX ADMIN — EMPAGLIFLOZIN 25 MG: 25 TABLET, FILM COATED ORAL at 09:52

## 2025-01-24 RX ADMIN — SERTRALINE HYDROCHLORIDE 50 MG: 50 TABLET ORAL at 09:52

## 2025-01-24 RX ADMIN — ACETAMINOPHEN 650 MG: 325 TABLET ORAL at 02:55

## 2025-01-24 RX ADMIN — SPIRONOLACTONE 25 MG: 25 TABLET ORAL at 09:52

## 2025-01-24 RX ADMIN — HEPARIN SODIUM 5000 UNITS: 5000 INJECTION INTRAVENOUS; SUBCUTANEOUS at 09:52

## 2025-01-24 RX ADMIN — LEVOTHYROXINE SODIUM 150 MCG: 0.15 TABLET ORAL at 05:32

## 2025-01-24 RX ADMIN — SACUBITRIL AND VALSARTAN 1 TABLET: 49; 51 TABLET, FILM COATED ORAL at 09:52

## 2025-01-24 RX ADMIN — Medication 1 APPLICATION: at 09:53

## 2025-01-24 RX ADMIN — ASPIRIN 81 MG CHEWABLE TABLET 81 MG: 81 TABLET CHEWABLE at 09:52

## 2025-01-24 RX ADMIN — PANTOPRAZOLE SODIUM 40 MG: 40 TABLET, DELAYED RELEASE ORAL at 05:32

## 2025-01-24 RX ADMIN — POLYVINYL ALCOHOL 2 DROP: 1.4 SOLUTION/ DROPS OPHTHALMIC at 09:52

## 2025-01-24 NOTE — PROGRESS NOTES
"          Clinical Nutrition Assessment   Patient Name: Roselia Rossi  YOB: 1960  MRN: [id number]  Date of Encounter: 01/24/25 09:59 EST  Admission date: 1/17/2025  Reason for Visit: Follow-up protocol    Assessment   Nutrition Assessment   Admission Diagnosis:  Stroke-like symptoms [R29.90]    Problem List:    Stroke-like symptoms      PMH:   She  has a past medical history of Coronary artery disease, Diabetes mellitus, Disease of thyroid gland, Hyperlipidemia, and Hypertension.    PSH:  She  has a past surgical history that includes Coronary artery bypass graft; Tubal ligation; Cholecystectomy; Cardiac catheterization (N/A, 7/21/2017); Colonoscopy; and Cardiac catheterization (N/A, 9/2/2021).    Applicable Nutrition History:   CVA  HTN/HLD  CAD/CABG  CHF  T2DM  CKD  Thyroid dz    (01/20) WOC: DTPI? Stage 2 PI?  (01/21) SLP: STC, chopped, thin    Anthropometrics   Height: Height: 165.1 cm (65\")  Last Filed Weight: Weight: 96.2 kg (212 lb) (01/18/25 1029)  Method:    BMI: BMI (Calculated): 35.3    UBW:    Weight     Weight (kg) Weight (lbs)   11/5/2024 91.989 kg  202 lb 12.8 oz    1/17/2025 96.48 kg  212 lb 11.2 oz    1/18/2025 96.163 kg  212 lb      Weight change: No significant changes    Nutrition Focused Physical Exam    Date: 01/21/25     Pt does not meet criteria for malnutrition diagnosis, at this time.      Subjective   Reported/Observed/Food/Nutrition Related History:   01/24/25  Unable to reach patient via phone. Very few meals documented since previous visit. Patient was very adamant at last visit regarding no protein supplements. However, would benefit from additional protein intake. Spoke with assigned RN who reported patient continues with minimal PO (~25% at meals).    01/21/25  Patient screened per nutrition protocol for possible pressure injury of stage 2 or greater. Presented for stroke-like symptoms. Patient upset at time of visit. C/o being at [facility], [place] by [facility] and not [place] as all " of her providers are there. No weight loss noted. Patient stated she eats well. Declined chewing or swallowing difficulties. Declined all ONS for wound healing as per patient, her kidney doctor doesn't allow her to have any extra protein. NKFA.    Current Nutrition Prescription   PO: Diet: Cardiac, Diabetic; Healthy Heart (2-3 Na+); Consistent Carbohydrate; Texture: Soft to Chew (NDD 3); Soft to Chew: Chopped Meat; Fluid Consistency: Thin (IDDSI 0)  Oral Nutrition Supplement: n/a  Intake: 1/22: B 10%, L 50% PO intake documented    Assessment & Plan   Nutrition Diagnosis   Date: 01/21/25           Updated:    Problem Increased nutrient needs - protein   Etiology Demand for wound healing   Signs/Symptoms DTPI or PI 2   Status: New    Goal:   Nutrition to support treatment and Establish PO    Nutrition Intervention      Follow treatment progress, Care plan reviewed, Interview for preferences, Encourage intake, Supplement offered/refused    Nutrition POC  Encourage increase in PO intake as able  Recommend consistent meal documentation  Recommend continued discussion regarding the importance of nutrition/protein intake  Recommend consideration of appetite stimulant, if medically appropriate    Monitoring/Evaluation:   Per protocol, PO intake, Weight, Skin status, Symptoms, POC/GOC    Sammi Hurt MS,RD,LD  Time Spent: 25min

## 2025-01-24 NOTE — DISCHARGE PLACEMENT REQUEST
"Roselia Rossi (64 y.o. Female)       Date of Birth   1960    Social Security Number       Address   [address] [place] [address]    Home Phone       MRN   [id number]       Sabianist   None    Marital Status   Single                            Admission Date   25    Admission Type   Urgent    Admitting Provider   Yecenia Murray MD    Attending Provider   Yecenia Murray MD    Department, Room/Bed   [facility] 3F, S325/1       Discharge Date       Discharge Disposition   Rehab Facility or Unit (DC - External)    Discharge Destination                                 Attending Provider: Yecenia Murray MD    Allergies: No Known Allergies    Isolation: None   Infection: None   Code Status: CPR    Ht: 165.1 cm (65\")   Wt: 96.2 kg (212 lb)    Admission Cmt: None   Principal Problem: None                  Active Insurance as of 2025       Primary Coverage       Payor Plan Insurance Group Employer/Plan Group    [employer]HEM BLUE CROSS ANTHEM BLUE CROSS BLUE SHIELD PPO [id number]       Payor Plan Address Payor Plan Phone Number Payor Plan Fax Number Effective Dates    [address] [phone]  2021 - None Entered    [facility] [id number]         Subscriber Name Subscriber Birth Date Member ID       ROSELIA ROSSI 1960 [id number]                     Emergency Contacts        (Rel.) Home Phone Work Phone Mobile Phone    Rhina Zavala (Daughter) [phone] -- --    Ginette Santos (Sister) -- -- [phone]                 Discharge Summary        Yecenia Murray MD at 25 1209              [facility] Medicine Services  DISCHARGE SUMMARY    Patient Name: Roselia Rossi  : 1960  MRN: [id number]    Date of Admission: 2025 10:16 PM  Date of Discharge:  2025  Primary Care Physician: Leatha Grigsby MD    Consults       No orders found from 2024 to 2025.      "       Hospital Course     Presenting Problem:     Active Hospital Problems    Diagnosis  POA    Acute CVA (cerebrovascular accident) [I63.9]  Yes    Type 2 diabetes mellitus with hyperglycemia [E11.65]  Yes      Resolved Hospital Problems   No resolved problems to display.      Hospital Course:  Roselia Rossi is a 64-year-old female with a PMH significant for right hemispheric watershed strokes (2023, residual left hand weakness), uncontrolled HTN, uncontrolled HLD, CAD, CABG, CHF, uncontrolled T2DM, CKD, and thyroid disease who presents to [facility] via EMS transfer from [facility] with complaints of multifocal right hemispheric infarcts.  Patient initially presented to OSH on 1/14/2025 with complaints of left-sided weakness.  She underwent an MRI on 1/14 that revealed multifocal right hemispheric areas of restricted diffusion.  She was started on DAPT with aspirin and Plavix as well as high-dose statin.  On 1/16/2025, she was noted to be more lethargic and generally weak.  She was unable to undergo repeat MRI and MRA secondary to agitation.  Repeat CT head per OSH MD with evolving infarcts and no hemorrhage.      Acute right hemispheric multifocal ischemic CVA  HTN  Possible etiology: Cardioembolic  Patient was on aspirin and Plavix at home.  Found to be Plavix unresponsive with P2Y12 of more than 218.  Switched to Brilinta  Continue intensive statin 80 mg at bedtime  No evidence of telemetry.  Event monitor at dc  My partner discussed with Dr. Young/neurology.  Patient with significant receptive aphasia and does not have capacity  Follow up with stroke 4-6 weeks  Continue PT and OT     Acute decompensated diastolic heart failure with pulmonary edema   Acute hypoxia, improved   HFpEF   CXR from 1/18/2025 with pulmonary edema  Echocardiogram with normal ejection fraction  Improved with diuresis  Repeat chest x-ray improved  Continue Entresto, metoprolol, and spirinolactone  Recheck BMP 2-3 days     Type 2  diabetes  A1c 12.0  Continue SSI     Hx of hypothyroidism:   Continue levothyroxine    Discharge Follow Up Recommendations for outpatient labs/diagnostics:  Facility physician 1 to 2 days  Stroke clinic in 4 weeks    Day of Discharge     HPI:   No new issues overnight  Agreeable to [facility] today  Aggrevated that hearing aids didn't get charged overnight    Vital Signs:   Temp:  [97.5 °F (36.4 °C)-97.9 °F (36.6 °C)] 97.9 °F (36.6 °C)  Heart Rate:  [55-83] 60  Resp:  [16-18] 16  BP: (114-171)/(67-87) 147/87  Flow (L/min) (Oxygen Therapy):  [2] 2    Physical Exam:  Constitutional: No acute distress, awake, alert, talking on phone  HENT: NCAT, mucous membranes moist  Respiratory: Clear to auscultation bilaterally, respiratory effort normal   Cardiovascular: RRR, no murmurs, rubs, or gallops  Gastrointestinal: Positive bowel sounds, soft, nontender, nondistended  Musculoskeletal: No bilateral ankle edema  Psychiatric: Appropriate affect, cooperative  Neurologic: Oriented x 3, ROBERTSON, speech clear  Skin: No rashes noted      Pertinent  and/or Most Recent Results     LAB RESULTS:      Lab 01/24/25  0415 01/20/25  1053 01/19/25  0442 01/18/25  0416 01/18/25  0000   WBC 8.22 7.95 7.96 8.62  --    HEMOGLOBIN 15.3 15.3 15.5 13.2  --    HEMATOCRIT 46.4 46.5 47.8* 41.2  --    PLATELETS 178 211 240 207  --    NEUTROS ABS 5.79 5.04 5.53 6.06  --    IMMATURE GRANS (ABS) 0.04 0.05 0.06* 0.03  --    LYMPHS ABS 1.51 1.81 1.26 1.47  --    MONOS ABS 0.46 0.64 0.59 0.66  --    EOS ABS 0.37 0.36 0.48* 0.36  --    MCV 89.4 88.9 90.0 90.9  --    LACTATE  --   --   --   --  1.0         Lab 01/24/25  0415 01/22/25  1604 01/22/25  0323 01/20/25  1053 01/19/25  0442 01/18/25  0416   SODIUM 141  --  141 142 143 135*   POTASSIUM 3.8 4.1 3.5 3.9 4.1 4.5   CHLORIDE 105  --  104 107 108* 104   CO2 28.0  --  30.0* 24.0 24.0 22.0   ANION GAP 8.0  --  7.0 11.0 11.0 9.0   BUN 16  --  14 15 16 20   CREATININE 1.34*  --  1.20* 1.09* 1.21* 1.47*   EGFR 43.8*  --   50.0* 56.1* 49.5* 39.2*   GLUCOSE 150*  --  95 152* 200* 168*   CALCIUM 8.8  --  9.1 9.0 9.2 8.9   MAGNESIUM 1.7  --   --   --   --   --    PHOSPHORUS 3.8  --   --   --   --   --    HEMOGLOBIN A1C  --   --   --   --   --  12.00*         Lab 01/24/25  0415 01/20/25  1053 01/19/25  0442 01/18/25  0416   TOTAL PROTEIN 5.2* 5.0* 5.5* 5.1*   ALBUMIN 2.4* 2.1* 2.3* 2.1*   GLOBULIN 2.8 2.9 3.2 3.0   ALT (SGPT) 14 11 13 14   AST (SGOT) 28 19 16 15   BILIRUBIN 0.3 0.3 0.3 0.2   ALK PHOS 105 107 113 101             Lab 01/18/25  0416   CHOLESTEROL 133   LDL CHOL 76   HDL CHOL 34*   TRIGLYCERIDES 129         Lab 01/18/25  0000   FOLATE 6.70   VITAMIN B 12 >2,000*         Brief Urine Lab Results  (Last result in the past 365 days)        Color   Clarity   Blood   Leuk Est   Nitrite   Protein   CREAT   Urine HCG        11/05/24 1519             97.2               Microbiology Results (last 10 days)       ** No results found for the last 240 hours. **            XR Chest 1 View    Result Date: 1/22/2025  XR CHEST 1 VW Date of Exam: 1/22/2025 5:23 PM EST Indication: f/u Comparison: 1/22/2025 Findings: Heart size stable. Significant interval improvement in aeration of the lungs and decreased prominence of the pulmonary vasculature. Persistent airspace disease in the left lung base and suspected small left pleural effusion that appears smaller     Impression: There has been marked improvement in appearance of pulmonary edema. There is persistent airspace disease in the left lung base and suspected small left pleural effusion that appears smaller than the previous exam Electronically Signed: Payam Rodgers  1/22/2025 9:27 PM EST  Workstation ID: [id number]    CT Cervical Spine Without Contrast    Result Date: 1/22/2025  CT CERVICAL SPINE WO CONTRAST Date of Exam: 1/22/2025 2:11 AM EST Indication: Neck pain. Comparison: None available. Technique: Axial CT images were obtained of the cervical spine without contrast administration.   Reconstructed coronal and sagittal images were also obtained. Automated exposure control and iterative construction methods were used. Findings: No acute fracture is identified. The craniocervical junction appears intact. The alignment is anatomic. The vertebral body heights appear normal. There are moderate discogenic changes at C5-6. The prevertebral soft tissues are unremarkable. C2-3: Mild disc bulge eccentric to the left. Mild right facet arthropathy. No spinal canal stenosis. Mild left neural foraminal stenosis. C3-4: Mild disc bulge. Moderate right and mild left facet arthropathy. Mild left uncovertebral hypertrophy. No spinal canal stenosis. Mild left neural foraminal stenosis. C4-5: Mild disc osteophyte complex eccentric to the left. Mild right and moderate left facet arthropathy. Mild right and moderate left uncovertebral hypertrophy. Mild spinal canal stenosis. Mild right and severe left neural foraminal stenosis. C5-6: Moderate disc osteophyte complex. Moderate bilateral facet arthropathy. Severe right and moderate left uncovertebral hypertrophy. Mild spinal canal stenosis. Severe right and moderate to severe left neural foraminal stenosis. C6-7: Mild bilateral facet arthropathy. Mild bilateral uncovertebral hypertrophy. No spinal canal stenosis. Mild bilateral neural foraminal stenosis. C7-T1: Mild right facet arthropathy. No spinal canal stenosis. No neural foraminal stenosis.     Impression: 1.No acute osseous process identified. 2.Multilevel degenerative changes of cervical spine as described above, most prominent at C4-5 and C5-6 where there is severe neural foraminal stenosis. Electronically Signed: Félix Cole MD  1/22/2025 8:48 AM EST  Workstation ID: [id number]    EEG    Result Date: 1/20/2025  Reason for referral: 64 y.o.female with eye twitching, consideration of seizures Technical Summary:  A 19 channel digital EEG was performed using the international 10-20 placement system, including eye  leads and EKG leads. Duration: 24 minutes Findings: The patient sleeps throughout much of the study.  Diffuse low amplitude 5 to 7 Hz theta is present symmetrically over both hemispheres.  When the patient is briefly roused with auditory stimulation, there is an increase in EMG artifact in faster theta frequencies.  A clear posterior rhythm is not seen.  No focal features or epileptiform activity are present.  Photic stimulation does not change the background.  Hyperventilation is not performed. Video: Available Technical quality: Good Rhythm strip: Regular, 70 bpm SUMMARY: Mild generalized slow No focal features or epileptiform activity are seen     Diffuse cerebral dysfunction mild degree, nonspecific No evidence for epilepsy is seen on the study This report is transcribed using the Dragon dictation system.      Duplex Venous Upper Extremity - Right CAR    Result Date: 1/18/2025    No evidence of deep or superficial venous thrombus in the right upper extremity.     Duplex Venous Lower Extremity - Bilateral CAR    Result Date: 1/18/2025    No evidence of deep or superficial venous thrombus in the right or left lower extremities.     XR Chest 1 View    Result Date: 1/18/2025  XR CHEST 1 VW Date of Exam: 1/18/2025 10:41 AM EST Indication: Soa Comparison: None available. Findings: Sternotomy wires are noted. Heart shadow appears enlarged. There is dense bilateral interstitial and airspace disease throughout the lungs in a pattern suggesting extensive pulmonary edema. There are probably small pleural effusions as well. No pneumothorax is seen.     Impression: Dense and extensive bilateral pulmonary disease in a pattern favoring pulmonary edema as etiology. Electronically Signed: Ismael Foote MD  1/18/2025 11:06 AM EST  Workstation ID: [id number]    MRI Brain Without Contrast    Result Date: 1/18/2025  MRI BRAIN WO CONTRAST Date of Exam: 1/18/2025 1:49 AM EST Indication: Stroke, follow up stroke.  Comparison: None  available. Technique:  Routine multiplanar/multisequence sequence images of the brain were obtained without contrast administration. Findings: Diffusion restriction is present compatible with acute infarct in the right MCA territory, prominently involving the right posterior temporal lobe with additional scattered frontal and parietal involvement. Evaluation is somewhat degraded by patient motion. There is some early right-sided cerebral edema with overlying sulcal effacement, otherwise without evidence of midline shift. Some areas of cortical intrinsic T1 shortening are present consistent with early laminar necrosis. There is no evidence of significant component of hemorrhage. The ventricles are normal in size and configuration, accounting for some mild generalized volume loss. No unexpected mass or mass effect is evident elsewhere. The orbits are normal. The paranasal sinuses appear clear.     Impression: Areas of acute infarct are present within the right MCA territory as above, with some mild early edema including overlying sulcal effacement, otherwise without evidence of midline shift or significant component of hemorrhage. Electronically Signed: Florian Lobo MD  1/18/2025 5:08 AM EST  Workstation ID: [id number]    XR CHEST 1 VW    Result Date: 1/17/2025  PORTABLE CHEST     1/17/2025 10:17 AM   HISTORY: Acute shortness of breath. COMPARISON: January 13, 2025. FINDINGS: The heart is stable in size.  The lung fields demonstrate no significant change in the severe diffuse interstitial and airspace opacities. There are small bilateral pleural effusions. There is no pneumothorax. Status post median sternotomy.    There has been no significant interval change. Continued follow-up recommended. Images reviewed, interpreted, and dictated by Dr. MARQUES Dodge. Transcribed by Sissy Petty PA-C.    CT Head Without Contrast    Result Date: 1/16/2025  HEAD CT     1/16/2025 8:50 AM HISTORY: Stroke, follow  up TECHNIQUE: Multiple axial CT images were performed from the foramen magnum to the vertex. Coronal reformatted images were reconstructed from axial data set. Individualized dose reduction techniques using automated exposure control or adjustment of mA and/or kV according to the patient size were employed. COMPARISON: MRI brain 1/14/2025. CT head 1/13/2025. FINDINGS: Right cerebellar hemisphere multifocal hypodense areas with cytotoxic edema, effacement of sulci and gyri and swelling involving the posterior right frontal lobe, parietal lobe, and posterior right temporal lobe, compatible with multifocal acute infarcts involving the right MCA territory. No evidence of hemorrhagic conversion. The brain volume is normal for the patient's age. Ventricles are normal in size and configuration. No midline shift. The basal cisterns are patent. No skull fracture. The visualized paranasal sinuses and mastoid air cells are clear.    Evolving multifocal right cerebral hemisphere acute infarcts in the right MCA territory. No evidence of hemorrhagic conversion. CRITICAL RESULT: No. COMMUNICATION: Per this written report. Images personally reviewed, interpreted, and dictated by MARTÍN Beckman.    US CAROTID BLOOD FLOW BILATERAL    Result Date: 1/14/2025  CAROTID DUPLEX DOPPLER HISTORY: Stroke. FINDINGS: Color Doppler, duplex Doppler and gray scale sonography of the bilateral neck vasculature was performed. Velocities were measured in the carotid arteries. Stenosis evaluation based on validated velocity criteria. The peak systolic velocity of the right common carotid artery is 50 to cm/sec and internal carotid artery 27 cm/sec. The diastolic velocity in the internal carotid artery is 5 cm/sec. Visually, a small amount of plaque is present. The ICA/CCA ratio is 0.53. These findings are consistent with less than 50%  stenosis.The external carotid artery is patent. The right vertebral artery is patent with antegrade flow. The  peak systolic velocity of the left common carotid artery is 116 cm/sec and internal carotid artery 102 cm/sec. The diastolic velocity in the internal carotid artery is 38 cm/sec. Visually, a small amount of plaque is present. The ICA/CCA ratio is 1.2. These findings are consistent with  less than 50% stenosis. The external carotid artery is patent. The left vertebral artery is patent with antegrade flow. .    No evidence of significant carotid stenosis. Bilateral patent vertebral arteries. Images reviewed, interpreted, and dictated by Fadi Jennings MD    ECHO COMPLETE (DOPPLER / COLOR) W OR WO CONTRAST    Result Date: 2025  TRANSTHORACIC ECHOCARDIOGRAPHY REPORT  Demographics  Patient Name:               NIK CELESTIN    :     1960  Medical Record Number:      [id number]            Age:     64 year(s)  Corporate ID Number:        [id number]            Gender   Female  Account Number:             [id number]  Sonographer:                Sara HERNANDEZ       Height:  65 inches  Referring Physician:                              Weight:  200 pounds  Interpreting Physician:     Charles De Leon MD       BMI:     33.28 kg/m^2  Date of Service:            2025  Room Number:                6116 Type of Study:  TTE procedure: ECHO COMPLETE (DOPPLER / COLOR) W OR WO CONTRAST.  Study Location: Portable  Impression:  Technically difficult study due to poor acoustic window.  Mild concentric left ventricular hypertrophy.  Visually estimated ejection fraction 35% +/- 5%.  Global hypokinesis, moderate  Increased left atrial pressure (Grade II diastolic dysfunction).  Normal sized right ventricle.  RV systolic function is mildly reduced  Moderately dilated left atrium.  Bubble study was non diagnostic  Moderately dilated right atrium.  Moderate (2+) mitral regurgitation.  Mild (1+) pulmonic valve regurgitation.  Mild (1+) tricuspid regurgitation.  RVSP 43 mmHg.  Mild pulmonary hypertension. Measurements  Summary:  LVEDd: 3.9 cm          LVESd: 3.4 cm           IVSEd: 1.4 cm  AO Root:2.6 cm         LVPWd: 1.3 cm  Left Ventricle  Peak E-wave: 1.21 m/s Peak A-wave: 1.04 m/s   E/A ratio: 1.16  E' Velocity: 0.05 m/s Volume qwxmneoqo76.3 ml  Volume [id number] ml  LVOT diameter: 2 cm  Normal sized left ventricle.  Mild concentric left ventricular hypertrophy.  Visually estimated ejection fraction 35% +/- 5%.  Global hypokinesis, moderate  Increased left atrial pressure (Grade II diastolic dysfunction).  Right Ventricle  Diastolic dimension: 3.5 cm   RV systolic pressure: 30.61 mmHg  Normal sized right ventricle.  RV systolic function is mildly reduced  Left Atrium  LA area: 23.2 cm^2                  LA volume:74 ml  LA dimension: 4.7 cm                LA/Aorta: 1.81  Moderately dilated left atrium..  Bubble study was non diagnostic  Right Atrium  Moderately dilated right atrium.  Mitral Valve  Deceleration time:                          Mean velocity:  173 msec              Area (continuity):    0.84 m/s  1.27 cm^2             Mean gradient: 3  mmHg  Peak gradient: 10  mmHg  Structurally normal mitral valve.  Moderate (2+) mitral regurgitation.  No mitral stenosis.  Aortic Valve  AV VTI: 21.9 cm     Area continuity: 2.49 Peak velocity: 1.1  LVOT VTI: 17.4 cm   cm^2                  m/s  Cusp separation:    Mean velocity: 0.75   Peak gradient: 4.84  1.8 cm              m/s                   mmHg  Mean gradient: 3  mmHg  Three cusped aortic valve  No aortic regurgitation.  No aortic stenosis.  Tricuspid Valve  TR velocity: 2.27 m/s       TR gradient: 20.6116 mmHg  Estimated RAP: 10 mmHg      RVSP: 43 mmHg  Not well visualized  Mild (1+) tricuspid regurgitation.  No tricuspid stenosis.  RVSP 43 mmHg.  Mild pulmonary hypertension.  Pulmonic Valve  Acceleration time: 77 msec            PASP: 30.61 mmHg  Pulmonic valve not well visualized.  Mild (1+) pulmonic valve regurgitation.  No pulmonic stenosis. Great Vessels Aorta   Aortic Root: 2.6 cm  LVOT Diameter: 2 cm Normal aortic root.  Pericardium / Pleura No pericardial effusion.  ----------------------------------------------------------------  Electronically signed by Charles De Leon MD(Interpreting  Physician) on 01/14/2025 17:50  ----------------------------------------------------------------    Mri brain wo  mra head wo    Result Date: 1/14/2025  MRI HEAD WITHOUT CONTRAST HISTORY: Left arm weakness, history of stroke COMPARISON: July 29, 2023 MRI. FINDINGS: Multiplanar MR imaging of the head was performed without contrast. Right hemisphere foci of encephalomalacia are seen consistent with prior infarcts. There is increased T1 cortical signal in several areas of the right hemisphere consistent with cortical laminar necrosis. On the diffusion weighted images, multiple foci of restricted diffusion are seen in the right cerebral hemisphere in the right middle cerebral artery territory. The largest area in the right posterior temporal lobe measures up to 40 mm.  The ventricles are within normal limits with respect to size.  There is  no evidence of shift of the midline structures.   No abnormal extra-axial fluid collection is seen. The posterior fossa and brainstem have an unremarkable appearance. Normal major vessel vascular flow voids are identified.    Multifocal right hemisphere areas of restricted diffusion consistent with multiple acute right middle cerebral artery territory infarcts. Foci of right hemisphere encephalomalacia consistent with prior infarcts. Images reviewed, interpreted, and dictated by Fadi Jennings MD    XR Chest 1 View    Result Date: 1/13/2025  PORTABLE CHEST      1/13/2025 8:52 PM HISTORY: Chest pain. COMPARISON: October 2024. FINDINGS: Median sternotomy changes are again noted. The heart is enlarged. The hilar structures are prominent. There are small bilateral pleural effusions and diffuse airspace and interstitial opacities. There is no pneumothorax. The  osseous structures are unremarkable.    Cardiomegaly. A pericardial effusion cannot be excluded due to the degree of opacity. Diffuse opacities may be secondary to a severe CHF exacerbation. Also, due to extensive degree of opacities, a concomitant infectious process cannot be excluded. Please correlate. Continued follow-up is recommended. Images reviewed, interpreted, and dictated by Dr. Albert Escobedo.    CT Head Without Contrast    Result Date: 1/13/2025  HEAD CT     1/13/2025 8:06 PM HISTORY: Focal neurologic deficit. COMPARISON: MRI of the brain from July 2023. TECHNIQUE: Multiple axial CT images were performed from the foramen magnum to the vertex. Individualized dose reduction techniques using automated exposure control or adjustment of mA and/or kV according to the patient size were employed. FINDINGS: There are multifocal regions of hypoattenuation seen in the right temporoparietal region and also right occipital region which are most compatible with chronic ischemia/evolving encephalomalacia. These areas appear more pronounced compared to the prior study from July 2023. Otherwise, there is mild generalized cerebral atrophy and proportionate enlargement of the ventricles. There is a mild degree of hypoattenuation seen in the periventricular and subcortical white matter, which is most consistent with chronic microvascular ischemia. There is no evidence of acute edema or hemorrhage.  No masses are identified. No extra-axial fluid is seen. The paranasal sinuses are unremarkable.    Probable evolving encephalomalacia in the distribution of the right MCA and right-sided watershed territory, diffuse cortical atrophy and chronic microvascular ischemia, without definite evidence of an acute intracranial process. Findings were discussed with the patient's physician via telephone at 8:30 PM. Images reviewed, interpreted, and dictated by LUISA Escobedo M.D.      Results for orders placed during the hospital  encounter of 01/17/25    Duplex Venous Upper Extremity - Right CAR    Interpretation Summary    No evidence of deep or superficial venous thrombus in the right upper extremity.      Results for orders placed during the hospital encounter of 01/17/25    Duplex Venous Upper Extremity - Right CAR    Interpretation Summary    No evidence of deep or superficial venous thrombus in the right upper extremity.      Results for orders placed during the hospital encounter of 01/17/25    Adult Transthoracic Echo Complete W/ Cont if Necessary Per Protocol (With Agitated Saline)    Interpretation Summary    Left ventricular systolic function is normal. Estimated left ventricular EF = 55%    Left ventricular wall thickness is consistent with mild concentric hypertrophy.    Mild mitral valve stenosis is present. The mitral valve mean gradient is 6 mmHg.    Mild mitral regurgitation.    Trace tricuspid regurgitation.    Saline test results are negative for intracardiac shunting.    No cardiac source for embolus is seen.      Plan for Follow-up of Pending Labs/Results:     Discharge Details        Discharge Medications        New Medications        Instructions Start Date   acetaminophen 325 MG tablet  Commonly known as: TYLENOL   650 mg, Oral, Every 6 Hours PRN      castor oil-balsam peru ointment   1 Application, Topical, Every 12 Hours Scheduled      Insulin Lispro 100 UNIT/ML injection  Commonly known as: humaLOG   2-9 Units, Subcutaneous, 4 Times Daily Before Meals & Nightly      Lidocaine 4 %   2 patches, Transdermal, Every 24 Hours Scheduled, Remove & Discard patch within 12 hours or as directed by MD   Start Date: January 25, 2025     melatonin 5 MG tablet tablet   2.5 mg, Oral, Nightly PRN      metoprolol succinate XL 25 MG 24 hr tablet  Commonly known as: TOPROL-XL   25 mg, Oral, Every 24 Hours Scheduled   Start Date: January 25, 2025     polyvinyl alcohol 1.4 % ophthalmic solution  Commonly known as: LIQUIFILM   2 drops,  Both Eyes, Every 3 Hours PRN      sacubitril-valsartan 49-51 MG tablet  Commonly known as: ENTRESTO   1 tablet, Oral, Every 12 Hours Scheduled      ticagrelor 90 MG tablet tablet  Commonly known as: BRILINTA   90 mg, Oral, 2 Times Daily             Changes to Medications        Instructions Start Date   atorvastatin 80 MG tablet  Commonly known as: LIPITOR  What changed: when to take this   80 mg, Oral, Nightly             Continue These Medications        Instructions Start Date   aspirin 81 MG EC tablet   81 mg, Daily      Dexcom G7  device   1 each, Not Applicable, Take As Directed      Dexcom G7 Sensor misc   1 each, Not Applicable, Every 10 Days      difluprednate 0.05 % ophthalmic emulsion  Commonly known as: DUREZOL   1 drop, 4 Times Daily      Farxiga 10 MG tablet  Generic drug: dapagliflozin Propanediol   10 mg, Daily      ketorolac 0.5 % ophthalmic solution  Commonly known as: ACULAR   1 drop, 4 Times Daily      levothyroxine 150 MCG tablet  Commonly known as: SYNTHROID, LEVOTHROID   150 mcg, Daily      pantoprazole 20 MG EC tablet  Commonly known as: PROTONIX   20 mg, Daily      sertraline 50 MG tablet  Commonly known as: ZOLOFT   50 mg, Daily      spironolactone 25 MG tablet  Commonly known as: ALDACTONE   25 mg, Daily      vitamin B-12 1000 MCG tablet  Commonly known as: CYANOCOBALAMIN   1,000 mcg, Daily      Vitamin C 500 MG capsule   Take  by mouth.      VITAMIN D3 PO   50,000 Units, Weekly      Zetia 10 MG tablet  Generic drug: ezetimibe   10 mg, Daily             Stop These Medications      clopidogrel 75 MG tablet  Commonly known as: PLAVIX     insulin lispro protamine-insulin lispro (75-25) 100 UNIT/ML suspension injection  Commonly known as: humaLOG 75-25     metFORMIN 500 MG tablet  Commonly known as: GLUCOPHAGE     metoprolol tartrate 25 MG tablet  Commonly known as: LOPRESSOR     Tiadylt  MG 24 hr capsule  Generic drug: dilTIAZem              No Known Allergies      Discharge  Disposition:  Rehab Facility or Unit (DC - External)    Diet:  Hospital:  Diet Order   Procedures    Diet: Cardiac, Diabetic; Healthy Heart (2-3 Na+); Consistent Carbohydrate; Texture: Soft to Chew (NDD 3); Soft to Chew: Chopped Meat; Fluid Consistency: Thin (IDDSI 0)       Diet Instructions       Diet: Cardiac Diets, Diabetic Diets; Healthy Heart (2-3 Na+); Soft to Chew (NDD 3); Chopped Meat; Thin (IDDSI 0); Consistent Carbohydrate      Discharge Diet:  Cardiac Diets  Diabetic Diets       Cardiac Diet: Healthy Heart (2-3 Na+)    Texture: Soft to Chew (NDD 3)    Soft to Chew: Chopped Meat    Fluid Consistency: Thin (IDDSI 0)    Diabetic Diet: Consistent Carbohydrate             Activity:  Activity Instructions       Activity as Tolerated              Restrictions or Other Recommendations:  None        CODE STATUS:    Code Status and Medical Interventions: CPR (Attempt to Resuscitate); Full Support   Ordered at: 01/17/25 7199     Level Of Support Discussed With:    Patient     Code Status (Patient has no pulse and is not breathing):    CPR (Attempt to Resuscitate)     Medical Interventions (Patient has pulse or is breathing):    Full Support       Future Appointments   Date Time Provider Department Center   1/24/2025  2:45 PM MED 8  [facility]   4/7/2025  3:15 PM Rosemary De Jesus APRN [facility]       Additional Instructions for the Follow-ups that You Need to Schedule       Ambulatory Referral to [facility]   As directed      Patient has a Holter monitor in place and is discharging to rehab.  The patient will need a 30 day monitor/loop recorder.    Order Comments: Patient has a Holter monitor in place and is discharging to rehab.  The patient will need a 30 day monitor/loop recorder.    Service Requested: Cryptogenic Stroke Clinic        Ambulatory Referral to Neurology   As directed      4-6 weeks.    Discharge Follow-up with PCP   As directed       Currently Documented  PCP:    Leatha Grigsby MD    PCP Phone Number:    [phone]     Follow Up Details: 1 week after dc from rehab        Discharge Follow-up with Specified Provider: Stroke clinic   As directed      To: Stroke clinic   Follow Up Details: 4-6 weeks                      Yecenia Murray MD  01/24/25      Time Spent on Discharge:  I spent  25  minutes on this discharge activity which included: face-to-face encounter with the patient, reviewing the data in the system, coordination of the care with the nursing staff as well as consultants, documentation, and entering orders.             Electronically signed by Yecenia Murray MD at 01/24/25 5007

## 2025-01-24 NOTE — CASE MANAGEMENT/SOCIAL WORK
Case Management Discharge Note      Final Note: Ms. Rossi has a rehab bed at [facility] today if medically ready. Confirmed with Jane with facility. Updated Ms. Rossi and Daughter Rhina and they are agreeable with discharge plan. She will be transported by [facility] at 1445. PCS form has been electronically submitted to the dropbox. Please call report to [phone]. CM will fax the discharge summary.         Selected Continued Care - Admitted Since 1/17/2025       Destination Coordination complete.      Service Provider Services Address Phone Fax Patient Preferred    [facility] [address] [address] [phone] --                      Transportation Services  Ambulance: [facility]    Final Discharge Disposition Code: 62 - inpatient rehab facility

## 2025-01-24 NOTE — DISCHARGE SUMMARY
[facility] Medicine Services  DISCHARGE SUMMARY    Patient Name: Roselia Rossi  : 1960  MRN: [id number]    Date of Admission: 2025 10:16 PM  Date of Discharge:  2025  Primary Care Physician: Leatha Grigsby MD    Consults       No orders found from 2024 to 2025.            Hospital Course     Presenting Problem:     Active Hospital Problems    Diagnosis  POA    Acute CVA (cerebrovascular accident) [I63.9]  Yes    Type 2 diabetes mellitus with hyperglycemia [E11.65]  Yes      Resolved Hospital Problems   No resolved problems to display.      Hospital Course:  Roselia Rossi is a 64-year-old female with a PMH significant for right hemispheric watershed strokes (, residual left hand weakness), uncontrolled HTN, uncontrolled HLD, CAD, CABG, CHF, uncontrolled T2DM, CKD, and thyroid disease who presents to [facility] via EMS transfer from [facility] with complaints of multifocal right hemispheric infarcts.  Patient initially presented to OSH on 2025 with complaints of left-sided weakness.  She underwent an MRI on  that revealed multifocal right hemispheric areas of restricted diffusion.  She was started on DAPT with aspirin and Plavix as well as high-dose statin.  On 2025, she was noted to be more lethargic and generally weak.  She was unable to undergo repeat MRI and MRA secondary to agitation.  Repeat CT head per OSH MD with evolving infarcts and no hemorrhage.      Acute right hemispheric multifocal ischemic CVA  HTN  Possible etiology: Cardioembolic  Patient was on aspirin and Plavix at home.  Found to be Plavix unresponsive with P2Y12 of more than 218.  Switched to Brilinta  Continue intensive statin 80 mg at bedtime  No evidence of telemetry.  Event monitor at [place]  My partner discussed with Dr. Young/neurology.  Patient with significant receptive aphasia and does not have capacity  Follow up with stroke 4-6 weeks  Continue PT  and OT     Acute decompensated diastolic heart failure with pulmonary edema   Acute hypoxia, improved   HFpEF   CXR from 1/18/2025 with pulmonary edema  Echocardiogram with normal ejection fraction  Improved with diuresis  Repeat chest x-ray improved  Continue Entresto, metoprolol, and spirinolactone  Recheck BMP 2-3 days     Type 2 diabetes  A1c 12.0  Continue SSI     Hx of hypothyroidism:   Continue levothyroxine    Discharge Follow Up Recommendations for outpatient labs/diagnostics:  Facility physician 1 to 2 days  Stroke clinic in 4 weeks    Day of Discharge     HPI:   No new issues overnight  Agreeable to [facility] today  Aggrevated that hearing aids didn't get charged overnight    Vital Signs:   Temp:  [97.5 °F (36.4 °C)-97.9 °F (36.6 °C)] 97.9 °F (36.6 °C)  Heart Rate:  [55-83] 60  Resp:  [16-18] 16  BP: (114-171)/(67-87) 147/87  Flow (L/min) (Oxygen Therapy):  [2] 2    Physical Exam:  Constitutional: No acute distress, awake, alert, talking on phone  HENT: NCAT, mucous membranes moist  Respiratory: Clear to auscultation bilaterally, respiratory effort normal   Cardiovascular: RRR, no murmurs, rubs, or gallops  Gastrointestinal: Positive bowel sounds, soft, nontender, nondistended  Musculoskeletal: No bilateral ankle edema  Psychiatric: Appropriate affect, cooperative  Neurologic: Oriented x 3, ROBERTSON, speech clear  Skin: No rashes noted      Pertinent  and/or Most Recent Results     LAB RESULTS:      Lab 01/24/25  0415 01/20/25  1053 01/19/25  0442 01/18/25  0416 01/18/25  0000   WBC 8.22 7.95 7.96 8.62  --    HEMOGLOBIN 15.3 15.3 15.5 13.2  --    HEMATOCRIT 46.4 46.5 47.8* 41.2  --    PLATELETS 178 211 240 207  --    NEUTROS ABS 5.79 5.04 5.53 6.06  --    IMMATURE GRANS (ABS) 0.04 0.05 0.06* 0.03  --    LYMPHS ABS 1.51 1.81 1.26 1.47  --    MONOS ABS 0.46 0.64 0.59 0.66  --    EOS ABS 0.37 0.36 0.48* 0.36  --    MCV 89.4 88.9 90.0 90.9  --    LACTATE  --   --   --   --  1.0         Lab 01/24/25  0415 01/22/25  1604  01/22/25  0323 01/20/25  1053 01/19/25  0442 01/18/25  0416   SODIUM 141  --  141 142 143 135*   POTASSIUM 3.8 4.1 3.5 3.9 4.1 4.5   CHLORIDE 105  --  104 107 108* 104   CO2 28.0  --  30.0* 24.0 24.0 22.0   ANION GAP 8.0  --  7.0 11.0 11.0 9.0   BUN 16  --  14 15 16 20   CREATININE 1.34*  --  1.20* 1.09* 1.21* 1.47*   EGFR 43.8*  --  50.0* 56.1* 49.5* 39.2*   GLUCOSE 150*  --  95 152* 200* 168*   CALCIUM 8.8  --  9.1 9.0 9.2 8.9   MAGNESIUM 1.7  --   --   --   --   --    PHOSPHORUS 3.8  --   --   --   --   --    HEMOGLOBIN A1C  --   --   --   --   --  12.00*         Lab 01/24/25  0415 01/20/25  1053 01/19/25  0442 01/18/25  0416   TOTAL PROTEIN 5.2* 5.0* 5.5* 5.1*   ALBUMIN 2.4* 2.1* 2.3* 2.1*   GLOBULIN 2.8 2.9 3.2 3.0   ALT (SGPT) 14 11 13 14   AST (SGOT) 28 19 16 15   BILIRUBIN 0.3 0.3 0.3 0.2   ALK PHOS 105 107 113 101             Lab 01/18/25  0416   CHOLESTEROL 133   LDL CHOL 76   HDL CHOL 34*   TRIGLYCERIDES 129         Lab 01/18/25  0000   FOLATE 6.70   VITAMIN B 12 >2,000*         Brief Urine Lab Results  (Last result in the past 365 days)        Color   Clarity   Blood   Leuk Est   Nitrite   Protein   CREAT   Urine HCG        11/05/24 1519             97.2               Microbiology Results (last 10 days)       ** No results found for the last 240 hours. **            XR Chest 1 View    Result Date: 1/22/2025  XR CHEST 1 VW Date of Exam: 1/22/2025 5:23 PM EST Indication: f/u Comparison: 1/22/2025 Findings: Heart size stable. Significant interval improvement in aeration of the lungs and decreased prominence of the pulmonary vasculature. Persistent airspace disease in the left lung base and suspected small left pleural effusion that appears smaller     Impression: There has been marked improvement in appearance of pulmonary edema. There is persistent airspace disease in the left lung base and suspected small left pleural effusion that appears smaller than the previous exam Electronically Signed: Payam  Ana Maria  1/22/2025 9:27 PM EST  Workstation ID: [id number]    CT Cervical Spine Without Contrast    Result Date: 1/22/2025  CT CERVICAL SPINE WO CONTRAST Date of Exam: 1/22/2025 2:11 AM EST Indication: Neck pain. Comparison: None available. Technique: Axial CT images were obtained of the cervical spine without contrast administration.  Reconstructed coronal and sagittal images were also obtained. Automated exposure control and iterative construction methods were used. Findings: No acute fracture is identified. The craniocervical junction appears intact. The alignment is anatomic. The vertebral body heights appear normal. There are moderate discogenic changes at C5-6. The prevertebral soft tissues are unremarkable. C2-3: Mild disc bulge eccentric to the left. Mild right facet arthropathy. No spinal canal stenosis. Mild left neural foraminal stenosis. C3-4: Mild disc bulge. Moderate right and mild left facet arthropathy. Mild left uncovertebral hypertrophy. No spinal canal stenosis. Mild left neural foraminal stenosis. C4-5: Mild disc osteophyte complex eccentric to the left. Mild right and moderate left facet arthropathy. Mild right and moderate left uncovertebral hypertrophy. Mild spinal canal stenosis. Mild right and severe left neural foraminal stenosis. C5-6: Moderate disc osteophyte complex. Moderate bilateral facet arthropathy. Severe right and moderate left uncovertebral hypertrophy. Mild spinal canal stenosis. Severe right and moderate to severe left neural foraminal stenosis. C6-7: Mild bilateral facet arthropathy. Mild bilateral uncovertebral hypertrophy. No spinal canal stenosis. Mild bilateral neural foraminal stenosis. C7-T1: Mild right facet arthropathy. No spinal canal stenosis. No neural foraminal stenosis.     Impression: 1.No acute osseous process identified. 2.Multilevel degenerative changes of cervical spine as described above, most prominent at C4-5 and C5-6 where there is severe neural foraminal  stenosis. Electronically Signed: Félix Cole MD  1/22/2025 8:48 AM EST  Workstation ID: [id number]    EEG    Result Date: 1/20/2025  Reason for referral: 64 y.o.female with eye twitching, consideration of seizures Technical Summary:  A 19 channel digital EEG was performed using the international 10-20 placement system, including eye leads and EKG leads. Duration: 24 minutes Findings: The patient sleeps throughout much of the study.  Diffuse low amplitude 5 to 7 Hz theta is present symmetrically over both hemispheres.  When the patient is briefly roused with auditory stimulation, there is an increase in EMG artifact in faster theta frequencies.  A clear posterior rhythm is not seen.  No focal features or epileptiform activity are present.  Photic stimulation does not change the background.  Hyperventilation is not performed. Video: Available Technical quality: Good Rhythm strip: Regular, 70 bpm SUMMARY: Mild generalized slow No focal features or epileptiform activity are seen     Diffuse cerebral dysfunction mild degree, nonspecific No evidence for epilepsy is seen on the study This report is transcribed using the Dragon dictation system.      Duplex Venous Upper Extremity - Right CAR    Result Date: 1/18/2025    No evidence of deep or superficial venous thrombus in the right upper extremity.     Duplex Venous Lower Extremity - Bilateral CAR    Result Date: 1/18/2025    No evidence of deep or superficial venous thrombus in the right or left lower extremities.     XR Chest 1 View    Result Date: 1/18/2025  XR CHEST 1 VW Date of Exam: 1/18/2025 10:41 AM EST Indication: Soa Comparison: None available. Findings: Sternotomy wires are noted. Heart shadow appears enlarged. There is dense bilateral interstitial and airspace disease throughout the lungs in a pattern suggesting extensive pulmonary edema. There are probably small pleural effusions as well. No pneumothorax is seen.     Impression: Dense and extensive  bilateral pulmonary disease in a pattern favoring pulmonary edema as etiology. Electronically Signed: Ismael Foote MD  1/18/2025 11:06 AM EST  Workstation ID: [id number]    MRI Brain Without Contrast    Result Date: 1/18/2025  MRI BRAIN WO CONTRAST Date of Exam: 1/18/2025 1:49 AM EST Indication: Stroke, follow up stroke.  Comparison: None available. Technique:  Routine multiplanar/multisequence sequence images of the brain were obtained without contrast administration. Findings: Diffusion restriction is present compatible with acute infarct in the right MCA territory, prominently involving the right posterior temporal lobe with additional scattered frontal and parietal involvement. Evaluation is somewhat degraded by patient motion. There is some early right-sided cerebral edema with overlying sulcal effacement, otherwise without evidence of midline shift. Some areas of cortical intrinsic T1 shortening are present consistent with early laminar necrosis. There is no evidence of significant component of hemorrhage. The ventricles are normal in size and configuration, accounting for some mild generalized volume loss. No unexpected mass or mass effect is evident elsewhere. The orbits are normal. The paranasal sinuses appear clear.     Impression: Areas of acute infarct are present within the right MCA territory as above, with some mild early edema including overlying sulcal effacement, otherwise without evidence of midline shift or significant component of hemorrhage. Electronically Signed: Florian Lobo MD  1/18/2025 5:08 AM EST  Workstation ID: [id number]    XR CHEST 1 VW    Result Date: 1/17/2025  PORTABLE CHEST     1/17/2025 10:17 AM   HISTORY: Acute shortness of breath. COMPARISON: January 13, 2025. FINDINGS: The heart is stable in size.  The lung fields demonstrate no significant change in the severe diffuse interstitial and airspace opacities. There are small bilateral pleural effusions. There is no pneumothorax.  Status post median sternotomy.    There has been no significant interval change. Continued follow-up recommended. Images reviewed, interpreted, and dictated by Dr. MARQUES Dodge. Transcribed by Sissy Petty PA-C.    CT Head Without Contrast    Result Date: 1/16/2025  HEAD CT     1/16/2025 8:50 AM HISTORY: Stroke, follow up TECHNIQUE: Multiple axial CT images were performed from the foramen magnum to the vertex. Coronal reformatted images were reconstructed from axial data set. Individualized dose reduction techniques using automated exposure control or adjustment of mA and/or kV according to the patient size were employed. COMPARISON: MRI brain 1/14/2025. CT head 1/13/2025. FINDINGS: Right cerebellar hemisphere multifocal hypodense areas with cytotoxic edema, effacement of sulci and gyri and swelling involving the posterior right frontal lobe, parietal lobe, and posterior right temporal lobe, compatible with multifocal acute infarcts involving the right MCA territory. No evidence of hemorrhagic conversion. The brain volume is normal for the patient's age. Ventricles are normal in size and configuration. No midline shift. The basal cisterns are patent. No skull fracture. The visualized paranasal sinuses and mastoid air cells are clear.    Evolving multifocal right cerebral hemisphere acute infarcts in the right MCA territory. No evidence of hemorrhagic conversion. CRITICAL RESULT: No. COMMUNICATION: Per this written report. Images personally reviewed, interpreted, and dictated by MARTÍN Beckman.    US CAROTID BLOOD FLOW BILATERAL    Result Date: 1/14/2025  CAROTID DUPLEX DOPPLER HISTORY: Stroke. FINDINGS: Color Doppler, duplex Doppler and gray scale sonography of the bilateral neck vasculature was performed. Velocities were measured in the carotid arteries. Stenosis evaluation based on validated velocity criteria. The peak systolic velocity of the right common carotid artery is 50 to cm/sec  and internal carotid artery 27 cm/sec. The diastolic velocity in the internal carotid artery is 5 cm/sec. Visually, a small amount of plaque is present. The ICA/CCA ratio is 0.53. These findings are consistent with less than 50%  stenosis.The external carotid artery is patent. The right vertebral artery is patent with antegrade flow. The peak systolic velocity of the left common carotid artery is 116 cm/sec and internal carotid artery 102 cm/sec. The diastolic velocity in the internal carotid artery is 38 cm/sec. Visually, a small amount of plaque is present. The ICA/CCA ratio is 1.2. These findings are consistent with  less than 50% stenosis. The external carotid artery is patent. The left vertebral artery is patent with antegrade flow. .    No evidence of significant carotid stenosis. Bilateral patent vertebral arteries. Images reviewed, interpreted, and dictated by Fadi Jennings MD    ECHO COMPLETE (DOPPLER / COLOR) W OR WO CONTRAST    Result Date: 2025  TRANSTHORACIC ECHOCARDIOGRAPHY REPORT  Demographics  Patient Name:               NIK CELESTIN    :     1960  Medical Record Number:      [id number]            Age:     64 year(s)  Corporate ID Number:        [id number]            Gender   Female  Account Number:             [id number]  Sonographer:                Sara HERNANDEZ       Height:  65 inches  Referring Physician:                              Weight:  200 pounds  Interpreting Physician:     Charles De Leon MD       BMI:     33.28 kg/m^2  Date of Service:            2025  Room Number:                6116 Type of Study:  TTE procedure: ECHO COMPLETE (DOPPLER / COLOR) W OR WO CONTRAST.  Study Location: Portable  Impression:  Technically difficult study due to poor acoustic window.  Mild concentric left ventricular hypertrophy.  Visually estimated ejection fraction 35% +/- 5%.  Global hypokinesis, moderate  Increased left atrial pressure (Grade II diastolic dysfunction).  Normal  sized right ventricle.  RV systolic function is mildly reduced  Moderately dilated left atrium.  Bubble study was non diagnostic  Moderately dilated right atrium.  Moderate (2+) mitral regurgitation.  Mild (1+) pulmonic valve regurgitation.  Mild (1+) tricuspid regurgitation.  RVSP 43 mmHg.  Mild pulmonary hypertension. Measurements Summary:  LVEDd: 3.9 cm          LVESd: 3.4 cm           IVSEd: 1.4 cm  AO Root:2.6 cm         LVPWd: 1.3 cm  Left Ventricle  Peak E-wave: 1.21 m/s Peak A-wave: 1.04 m/s   E/A ratio: 1.16  E' Velocity: 0.05 m/s Volume [id number] ml  Volume [id number] ml  LVOT diameter: 2 cm  Normal sized left ventricle.  Mild concentric left ventricular hypertrophy.  Visually estimated ejection fraction 35% +/- 5%.  Global hypokinesis, moderate  Increased left atrial pressure (Grade II diastolic dysfunction).  Right Ventricle  Diastolic dimension: 3.5 cm   RV systolic pressure: 30.61 mmHg  Normal sized right ventricle.  RV systolic function is mildly reduced  Left Atrium  LA area: 23.2 cm^2                  LA volume:74 ml  LA dimension: 4.7 cm                LA/Aorta: 1.81  Moderately dilated left atrium..  Bubble study was non diagnostic  Right Atrium  Moderately dilated right atrium.  Mitral Valve  Deceleration time:                          Mean velocity:  173 msec              Area (continuity):    0.84 m/s  1.27 cm^2             Mean gradient: 3  mmHg  Peak gradient: 10  mmHg  Structurally normal mitral valve.  Moderate (2+) mitral regurgitation.  No mitral stenosis.  Aortic Valve  AV VTI: 21.9 cm     Area continuity: 2.49 Peak velocity: 1.1  LVOT VTI: 17.4 cm   cm^2                  m/s  Cusp separation:    Mean velocity: 0.75   Peak gradient: 4.84  1.8 cm              m/s                   mmHg  Mean gradient: 3  mmHg  Three cusped aortic valve  No aortic regurgitation.  No aortic stenosis.  Tricuspid Valve  TR velocity: 2.27 m/s       TR gradient: 20.6116 mmHg  Estimated RAP: 10 mmHg       RVSP: 43 mmHg  Not well visualized  Mild (1+) tricuspid regurgitation.  No tricuspid stenosis.  RVSP 43 mmHg.  Mild pulmonary hypertension.  Pulmonic Valve  Acceleration time: 77 msec            PASP: 30.61 mmHg  Pulmonic valve not well visualized.  Mild (1+) pulmonic valve regurgitation.  No pulmonic stenosis. Great Vessels Aorta  Aortic Root: 2.6 cm  LVOT Diameter: 2 cm Normal aortic root.  Pericardium / Pleura No pericardial effusion.  ----------------------------------------------------------------  Electronically signed by Charles De Leon MD(Interpreting  Physician) on 01/14/2025 17:50  ----------------------------------------------------------------    Mri brain wo  mra head wo    Result Date: 1/14/2025  MRI HEAD WITHOUT CONTRAST HISTORY: Left arm weakness, history of stroke COMPARISON: July 29, 2023 MRI. FINDINGS: Multiplanar MR imaging of the head was performed without contrast. Right hemisphere foci of encephalomalacia are seen consistent with prior infarcts. There is increased T1 cortical signal in several areas of the right hemisphere consistent with cortical laminar necrosis. On the diffusion weighted images, multiple foci of restricted diffusion are seen in the right cerebral hemisphere in the right middle cerebral artery territory. The largest area in the right posterior temporal lobe measures up to 40 mm.  The ventricles are within normal limits with respect to size.  There is  no evidence of shift of the midline structures.   No abnormal extra-axial fluid collection is seen. The posterior fossa and brainstem have an unremarkable appearance. Normal major vessel vascular flow voids are identified.    Multifocal right hemisphere areas of restricted diffusion consistent with multiple acute right middle cerebral artery territory infarcts. Foci of right hemisphere encephalomalacia consistent with prior infarcts. Images reviewed, interpreted, and dictated by Fadi Jennings MD    XR Chest 1 View    Result  Date: 1/13/2025  PORTABLE CHEST      1/13/2025 8:52 PM HISTORY: Chest pain. COMPARISON: October 2024. FINDINGS: Median sternotomy changes are again noted. The heart is enlarged. The hilar structures are prominent. There are small bilateral pleural effusions and diffuse airspace and interstitial opacities. There is no pneumothorax. The osseous structures are unremarkable.    Cardiomegaly. A pericardial effusion cannot be excluded due to the degree of opacity. Diffuse opacities may be secondary to a severe CHF exacerbation. Also, due to extensive degree of opacities, a concomitant infectious process cannot be excluded. Please correlate. Continued follow-up is recommended. Images reviewed, interpreted, and dictated by Dr. Albert Escobedo.    CT Head Without Contrast    Result Date: 1/13/2025  HEAD CT     1/13/2025 8:06 PM HISTORY: Focal neurologic deficit. COMPARISON: MRI of the brain from July 2023. TECHNIQUE: Multiple axial CT images were performed from the foramen magnum to the vertex. Individualized dose reduction techniques using automated exposure control or adjustment of mA and/or kV according to the patient size were employed. FINDINGS: There are multifocal regions of hypoattenuation seen in the right temporoparietal region and also right occipital region which are most compatible with chronic ischemia/evolving encephalomalacia. These areas appear more pronounced compared to the prior study from July 2023. Otherwise, there is mild generalized cerebral atrophy and proportionate enlargement of the ventricles. There is a mild degree of hypoattenuation seen in the periventricular and subcortical white matter, which is most consistent with chronic microvascular ischemia. There is no evidence of acute edema or hemorrhage.  No masses are identified. No extra-axial fluid is seen. The paranasal sinuses are unremarkable.    Probable evolving encephalomalacia in the distribution of the right MCA and right-sided  watershed territory, diffuse cortical atrophy and chronic microvascular ischemia, without definite evidence of an acute intracranial process. Findings were discussed with the patient's physician via telephone at 8:30 PM. Images reviewed, interpreted, and dictated by LUISA Escobedo M.D.      Results for orders placed during the hospital encounter of 01/17/25    Duplex Venous Upper Extremity - Right CAR    Interpretation Summary    No evidence of deep or superficial venous thrombus in the right upper extremity.      Results for orders placed during the hospital encounter of 01/17/25    Duplex Venous Upper Extremity - Right CAR    Interpretation Summary    No evidence of deep or superficial venous thrombus in the right upper extremity.      Results for orders placed during the hospital encounter of 01/17/25    Adult Transthoracic Echo Complete W/ Cont if Necessary Per Protocol (With Agitated Saline)    Interpretation Summary    Left ventricular systolic function is normal. Estimated left ventricular EF = 55%    Left ventricular wall thickness is consistent with mild concentric hypertrophy.    Mild mitral valve stenosis is present. The mitral valve mean gradient is 6 mmHg.    Mild mitral regurgitation.    Trace tricuspid regurgitation.    Saline test results are negative for intracardiac shunting.    No cardiac source for embolus is seen.      Plan for Follow-up of Pending Labs/Results:     Discharge Details        Discharge Medications        New Medications        Instructions Start Date   acetaminophen 325 MG tablet  Commonly known as: TYLENOL   650 mg, Oral, Every 6 Hours PRN      castor oil-balsam peru ointment   1 Application, Topical, Every 12 Hours Scheduled      Insulin Lispro 100 UNIT/ML injection  Commonly known as: humaLOG   2-9 Units, Subcutaneous, 4 Times Daily Before Meals & Nightly      Lidocaine 4 %   2 patches, Transdermal, Every 24 Hours Scheduled, Remove & Discard patch within 12 hours or as  directed by MD   Start Date: January 25, 2025     melatonin 5 MG tablet tablet   2.5 mg, Oral, Nightly PRN      metoprolol succinate XL 25 MG 24 hr tablet  Commonly known as: TOPROL-XL   25 mg, Oral, Every 24 Hours Scheduled   Start Date: January 25, 2025     polyvinyl alcohol 1.4 % ophthalmic solution  Commonly known as: LIQUIFILM   2 drops, Both Eyes, Every 3 Hours PRN      sacubitril-valsartan 49-51 MG tablet  Commonly known as: ENTRESTO   1 tablet, Oral, Every 12 Hours Scheduled      ticagrelor 90 MG tablet tablet  Commonly known as: BRILINTA   90 mg, Oral, 2 Times Daily             Changes to Medications        Instructions Start Date   atorvastatin 80 MG tablet  Commonly known as: LIPITOR  What changed: when to take this   80 mg, Oral, Nightly             Continue These Medications        Instructions Start Date   aspirin 81 MG EC tablet   81 mg, Daily      Dexcom G7  device   1 each, Not Applicable, Take As Directed      Dexcom G7 Sensor misc   1 each, Not Applicable, Every 10 Days      difluprednate 0.05 % ophthalmic emulsion  Commonly known as: DUREZOL   1 drop, 4 Times Daily      Farxiga 10 MG tablet  Generic drug: dapagliflozin Propanediol   10 mg, Daily      ketorolac 0.5 % ophthalmic solution  Commonly known as: ACULAR   1 drop, 4 Times Daily      levothyroxine 150 MCG tablet  Commonly known as: SYNTHROID, LEVOTHROID   150 mcg, Daily      pantoprazole 20 MG EC tablet  Commonly known as: PROTONIX   20 mg, Daily      sertraline 50 MG tablet  Commonly known as: ZOLOFT   50 mg, Daily      spironolactone 25 MG tablet  Commonly known as: ALDACTONE   25 mg, Daily      vitamin B-12 1000 MCG tablet  Commonly known as: CYANOCOBALAMIN   1,000 mcg, Daily      Vitamin C 500 MG capsule   Take  by mouth.      VITAMIN D3 PO   50,000 Units, Weekly      Zetia 10 MG tablet  Generic drug: ezetimibe   10 mg, Daily             Stop These Medications      clopidogrel 75 MG tablet  Commonly known as: PLAVIX     insulin  lispro protamine-insulin lispro (75-25) 100 UNIT/ML suspension injection  Commonly known as: humaLOG 75-25     metFORMIN 500 MG tablet  Commonly known as: GLUCOPHAGE     metoprolol tartrate 25 MG tablet  Commonly known as: LOPRESSOR     Tiadylt  MG 24 hr capsule  Generic drug: dilTIAZem              No Known Allergies      Discharge Disposition:  Rehab Facility or Unit (DC - External)    Diet:  Hospital:  Diet Order   Procedures    Diet: Cardiac, Diabetic; Healthy Heart (2-3 Na+); Consistent Carbohydrate; Texture: Soft to Chew (NDD 3); Soft to Chew: Chopped Meat; Fluid Consistency: Thin (IDDSI 0)       Diet Instructions       Diet: Cardiac Diets, Diabetic Diets; Healthy Heart (2-3 Na+); Soft to Chew (NDD 3); Chopped Meat; Thin (IDDSI 0); Consistent Carbohydrate      Discharge Diet:  Cardiac Diets  Diabetic Diets       Cardiac Diet: Healthy Heart (2-3 Na+)    Texture: Soft to Chew (NDD 3)    Soft to Chew: Chopped Meat    Fluid Consistency: Thin (IDDSI 0)    Diabetic Diet: Consistent Carbohydrate             Activity:  Activity Instructions       Activity as Tolerated              Restrictions or Other Recommendations:  None        CODE STATUS:    Code Status and Medical Interventions: CPR (Attempt to Resuscitate); Full Support   Ordered at: 01/17/25 0726     Level Of Support Discussed With:    Patient     Code Status (Patient has no pulse and is not breathing):    CPR (Attempt to Resuscitate)     Medical Interventions (Patient has pulse or is breathing):    Full Support       Future Appointments   Date Time Provider Department Center   1/24/2025  2:45 PM MED 8  [facility]   4/7/2025  3:15 PM Rsoemary De Jesus APRN [facility]       Additional Instructions for the Follow-ups that You Need to Schedule       Ambulatory Referral to [facility]   As directed      Patient has a Holter monitor in place and is discharging to rehab.  The patient will need a 30 day monitor/loop  recorder.    Order Comments: Patient has a Holter monitor in place and is discharging to rehab.  The patient will need a 30 day monitor/loop recorder.    Service Requested: [facility] Stroke Clinic        Ambulatory Referral to Neurology   As directed      4-6 weeks.    Discharge Follow-up with PCP   As directed       Currently Documented PCP:    Leatha Grigsby MD    PCP Phone Number:    [phone]     Follow Up Details: 1 week after dc from rehab        Discharge Follow-up with Specified Provider: Stroke clinic   As directed      To: Stroke clinic   Follow Up Details: 4-6 weeks                      Yecenia Murray MD  01/24/25      Time Spent on Discharge:  I spent  25  minutes on this discharge activity which included: face-to-face encounter with the patient, reviewing the data in the system, coordination of the care with the nursing staff as well as consultants, documentation, and entering orders.

## 2025-02-11 LAB
CV ZIO BASELINE AVG BPM: 59 BPM
CV ZIO BASELINE BPM HIGH: 158 BPM
CV ZIO BASELINE BPM LOW: 43 BPM
CV ZIO DEVICE ANALYSIS TIME: NORMAL
CV ZIO ECT SVE COUNT: 726 EPISODES
CV ZIO ECT SVE CPLT COUNT: 64 EPISODES
CV ZIO ECT SVE CPLT FREQ: NORMAL
CV ZIO ECT SVE FREQ: NORMAL
CV ZIO ECT SVE TPLT COUNT: 16 EPISODES
CV ZIO ECT SVE TPLT FREQ: NORMAL
CV ZIO ECT VE COUNT: 538 EPISODES
CV ZIO ECT VE CPLT COUNT: 0 EPISODES
CV ZIO ECT VE CPLT FREQ: 0
CV ZIO ECT VE FREQ: NORMAL
CV ZIO ECT VE TPLT COUNT: 0 EPISODES
CV ZIO ECT VE TPLT FREQ: 0
CV ZIO ECTOPIC SVE COUPLET RAW PERCENT: 0.01 %
CV ZIO ECTOPIC SVE ISOLATED PERCENT: 0.07 %
CV ZIO ECTOPIC SVE TRIPLET RAW PERCENT: 0 %
CV ZIO ECTOPIC VE COUPLET RAW PERCENT: 0 %
CV ZIO ECTOPIC VE ISOLATED PERCENT: 0.05 %
CV ZIO ECTOPIC VE TRIPLET RAW PERCENT: 0 %
CV ZIO ENROLLMENT END: NORMAL
CV ZIO ENROLLMENT START: NORMAL
CV ZIO PATIENT EVENTS DIARIES: 0
CV ZIO PATIENT EVENTS TRIGGERS: 1
CV ZIO PAUSE COUNT: 0
CV ZIO PRESCRIPTION STATUS: NORMAL
CV ZIO SVT AVG BPM: 112 BPM
CV ZIO SVT BPM HIGH: 158 BPM
CV ZIO SVT BPM LOW: 73 BPM
CV ZIO SVT COUNT: 18
CV ZIO SVT F EPI AVG BPM: 133 BPM
CV ZIO SVT F EPI BEATS: 4 BEATS
CV ZIO SVT F EPI BPM HIGH: 158 BPM
CV ZIO SVT F EPI BPM LOW: 103 BPM
CV ZIO SVT F EPI DUR: 2.2 SEC
CV ZIO SVT F EPI END: NORMAL
CV ZIO SVT F EPI START: NORMAL
CV ZIO SVT L EPI AVG BPM: 94 BPM
CV ZIO SVT L EPI BEATS: 13 BEATS
CV ZIO SVT L EPI BPM HIGH: 107 BPM
CV ZIO SVT L EPI BPM LOW: 73 BPM
CV ZIO SVT L EPI DUR: 8.6 SEC
CV ZIO SVT L EPI END: NORMAL
CV ZIO SVT L EPI START: NORMAL
CV ZIO TOTAL  ENROLLMENT PERIOD: NORMAL
CV ZIO VT COUNT: 0

## 2025-04-01 DIAGNOSIS — Z79.4 TYPE 2 DIABETES MELLITUS WITH HYPERGLYCEMIA, WITH LONG-TERM CURRENT USE OF INSULIN: ICD-10-CM

## 2025-04-01 DIAGNOSIS — E11.65 TYPE 2 DIABETES MELLITUS WITH HYPERGLYCEMIA, WITH LONG-TERM CURRENT USE OF INSULIN: ICD-10-CM

## 2025-04-01 RX ORDER — ACYCLOVIR 400 MG/1
1 TABLET ORAL TAKE AS DIRECTED
Qty: 1 EACH | Refills: 0 | Status: SHIPPED | OUTPATIENT
Start: 2025-04-01

## 2025-04-23 ENCOUNTER — OFFICE VISIT (OUTPATIENT)
Dept: ENDOCRINOLOGY | Facility: CLINIC | Age: 65
End: 2025-04-23
Payer: COMMERCIAL

## 2025-04-23 VITALS
SYSTOLIC BLOOD PRESSURE: 157 MMHG | HEART RATE: 63 BPM | WEIGHT: 178 LBS | DIASTOLIC BLOOD PRESSURE: 81 MMHG | BODY MASS INDEX: 29.62 KG/M2 | OXYGEN SATURATION: 96 %

## 2025-04-23 DIAGNOSIS — Z79.4 TYPE 2 DIABETES MELLITUS WITH HYPERGLYCEMIA, WITH LONG-TERM CURRENT USE OF INSULIN: Primary | ICD-10-CM

## 2025-04-23 DIAGNOSIS — E11.65 TYPE 2 DIABETES MELLITUS WITH HYPERGLYCEMIA, WITH LONG-TERM CURRENT USE OF INSULIN: Primary | ICD-10-CM

## 2025-04-23 LAB
EXPIRATION DATE: ABNORMAL
HBA1C MFR BLD: 6.9 % (ref 4.5–5.7)
Lab: ABNORMAL

## 2025-04-23 PROCEDURE — 84439 ASSAY OF FREE THYROXINE: CPT | Performed by: NURSE PRACTITIONER

## 2025-04-23 PROCEDURE — 84443 ASSAY THYROID STIM HORMONE: CPT | Performed by: NURSE PRACTITIONER

## 2025-04-23 PROCEDURE — 80061 LIPID PANEL: CPT | Performed by: NURSE PRACTITIONER

## 2025-04-23 RX ORDER — ACYCLOVIR 400 MG/1
1 TABLET ORAL
Qty: 3 EACH | Refills: 5 | Status: SHIPPED | OUTPATIENT
Start: 2025-04-23

## 2025-04-23 RX ORDER — DILTIAZEM HYDROCHLORIDE 180 MG/1
180 CAPSULE, EXTENDED RELEASE ORAL
COMMUNITY

## 2025-04-23 RX ORDER — NITROFURANTOIN 25; 75 MG/1; MG/1
CAPSULE ORAL
COMMUNITY
Start: 2025-04-17

## 2025-04-23 RX ORDER — NITROGLYCERIN 0.4 MG/1
0.4 TABLET SUBLINGUAL
COMMUNITY
Start: 2025-04-17 | End: 2026-04-17

## 2025-04-23 NOTE — PROGRESS NOTES
Chief Complaint   Patient presents with    Follow-up     Type 2 diabetes mellitus with hyperglycemia, with long-term current use of insulin            Referring Provider  No ref. provider found     HPI   Roselia Rossi is a 64 y.o. female had concerns including Follow-up (Type 2 diabetes mellitus with hyperglycemia, with long-term current use of insulin//).    T2DM.    She is having improvements to her blood sugars, but they are still above goal.   She had a NSTEMI and she would not let them do anything other than an EKG.  She declined a cath or stent or CABG.   She denies any changes to her health otherwise and has been taking her medication regularly and not missing doses as she has caregivers around the clock that take care of her.    Diabetes:  Diabetes was diagnosed late 's/early 's.  Complications include CABG in  & another in in early s-was dx'd after, has read that Lipitor can cause diabetes and thinks that it caused it, ischemic stroke in 2023 & 2024-noted with elevated BP's at that time, .  Last ophtho exam was 2024-cataract removal with lenses replacement, macular edema-[facility] sees them q 4-6 weeks.  Current medications for diabetes include Farxiga 10 mg QD, Humalog 75/25 25 units BID, Metformin 500 mg BID.  Previous meds: Januvia-has been on in the past, Trulicity-unable to tolerate vomiting daily.  Glucagon: Last use: none  She checks her blood sugar with CGM.   Hypos:none  FSB-150    ACE/ARB:yes, Statin: yes  Labs:  A1C:14.1 (2024), 9.8 (10/4/24)  Lipid Panel:utd-per cardiologist  KERMIT: utd-PCP    The following portions of the patient's history were reviewed and updated as appropriate: allergies, current medications, past family history, past medical history, past social history, past surgical history, and problem list.    Diet: eats a lot of fast food-[employer]  Breakfast: black coffee, egg McMuffin-[employer]  Lunch: fast food  Dinner: fast food  She  has seen dietician in the past.    Past Medical History:   Diagnosis Date    Coronary artery disease     Diabetes mellitus     Disease of thyroid gland     Heart attack 04/15/2025    Hyperlipidemia     Hypertension      Past Surgical History:   Procedure Laterality Date    CARDIAC CATHETERIZATION N/A 7/21/2017    Procedure: Left Heart Cath;  Surgeon: Tommy Haider MD;  Location:  [facility];  Service:     CARDIAC CATHETERIZATION N/A 9/2/2021    Procedure: Left Heart Cath;  Surgeon: Tommy Haider MD;  Location:  [facility];  Service: Cardiovascular;  Laterality: N/A;    CHOLECYSTECTOMY      COLONOSCOPY      CORONARY ARTERY BYPASS GRAFT      TUBAL ABDOMINAL LIGATION        Family History   Problem Relation Age of Onset    Cancer Sister     Diabetes Other     Heart disease Other     Cancer Other     Cancer Cousin       Social History     Socioeconomic History    Marital status: Single   Tobacco Use    Smoking status: Former     Passive exposure: Past    Smokeless tobacco: Never   Vaping Use    Vaping status: Never Used   Substance and Sexual Activity    Alcohol use: No    Drug use: No    Sexual activity: Defer      No Known Allergies   Current Outpatient Medications on File Prior to Visit   Medication Sig Dispense Refill    acetaminophen (TYLENOL) 325 MG tablet Take 2 tablets by mouth Every 6 (Six) Hours As Needed for Mild Pain.      Ascorbic Acid (Vitamin C) 500 MG capsule Take  by mouth.      aspirin 81 MG EC tablet Take 1 tablet by mouth Daily.      atorvastatin (LIPITOR) 80 MG tablet Take 1 tablet by mouth Every Night.      castor oil-balsam peru (VENELEX) ointment Apply 1 Application topically to the appropriate area as directed Every 12 (Twelve) Hours.      Cholecalciferol (VITAMIN D3 PO) Take 50,000 Units by mouth 1 (One) Time Per Week.      Continuous Glucose  (Dexcom G7 ) device Use 1 each Take As Directed. 1 each 0    dapagliflozin Propanediol  (Farxiga) 10 MG tablet Take 10 mg by mouth Daily.      difluprednate (DUREZOL) 0.05 % ophthalmic emulsion Administer 1 drop to both eyes 4 (Four) Times a Day.      dilTIAZem (TIAZAC) 180 MG 24 hr capsule Take 1 capsule by mouth.      ezetimibe (Zetia) 10 MG tablet Take 1 tablet by mouth Daily.      Insulin Lispro (humaLOG) 100 UNIT/ML injection Inject 2-9 Units under the skin into the appropriate area as directed 4 (Four) Times a Day Before Meals & at Bedtime.      ketorolac (ACULAR) 0.5 % ophthalmic solution Administer 1 drop to both eyes 4 (Four) Times a Day.      levothyroxine (SYNTHROID, LEVOTHROID) 150 MCG tablet Take 1 tablet by mouth Daily.      Lidocaine 4 % Place 2 patches on the skin as directed by provider Daily. Remove & Discard patch within 12 hours or as directed by MD      melatonin 5 MG tablet tablet Take 0.5 tablets by mouth At Night As Needed (sleep).      metoprolol succinate XL (TOPROL-XL) 25 MG 24 hr tablet Take 1 tablet by mouth Daily.      nitrofurantoin, macrocrystal-monohydrate, (MACROBID) 100 MG capsule TAKE 1 CAPSULE (100 MG TOTAL) BY MOUTH 2 (TWO) TIMES DAILY WITH BREAKFAST AND DINNER FOR 5 DAYS.      nitroglycerin (NITROSTAT) 0.4 MG SL tablet Place 1 tablet under the tongue Every 5 (Five) Minutes As Needed for Chest Pain.      pantoprazole (PROTONIX) 20 MG EC tablet Take 1 tablet by mouth Daily.      polyvinyl alcohol (LIQUIFILM) 1.4 % ophthalmic solution Administer 2 drops to both eyes Every 3 (Three) Hours As Needed for Dry Eyes.      sacubitril-valsartan (ENTRESTO) 49-51 MG tablet Take 1 tablet by mouth Every 12 (Twelve) Hours.      sertraline (ZOLOFT) 50 MG tablet Take 1 tablet by mouth Daily.      spironolactone (ALDACTONE) 25 MG tablet Take 1 tablet by mouth Daily.      ticagrelor (BRILINTA) 90 MG tablet tablet Take 1 tablet by mouth 2 (Two) Times a Day.      vitamin B-12 (CYANOCOBALAMIN) 1000 MCG tablet Take 1 tablet by mouth Daily.      [DISCONTINUED] Continuous Glucose Sensor  (Dexcom G7 Sensor) misc Use 1 each Every 10 (Ten) Days. 3 each 5    [DISCONTINUED] metFORMIN (GLUCOPHAGE) 500 MG tablet Take 1 tablet by mouth 2 (Two) Times a Day.       No current facility-administered medications on file prior to visit.        Review of Systems   Constitutional:  Positive for fatigue.   Eyes:  Positive for blurred vision and visual disturbance.   Endocrine: Positive for polydipsia and polyuria.   All other systems reviewed and are negative.     /81 (BP Location: Right arm, Patient Position: Sitting, Cuff Size: Large Adult)   Pulse 63   Wt 80.7 kg (178 lb)   LMP  (LMP Unknown)   SpO2 96%   BMI 29.62 kg/m²      Physical Exam  Vitals reviewed.   Constitutional:       Appearance: Normal appearance.   Eyes:      Extraocular Movements: Extraocular movements intact.   Neck:      Thyroid: No thyroid mass, thyromegaly or thyroid tenderness.   Cardiovascular:      Rate and Rhythm: Normal rate and regular rhythm.   Pulmonary:      Effort: Pulmonary effort is normal.      Breath sounds: Normal breath sounds.   Feet:      Right foot:      Skin integrity: Skin integrity normal.      Left foot:      Skin integrity: Skin integrity normal.   Skin:     Findings: No abrasion or wound.   Neurological:      General: No focal deficit present.      Mental Status: She is alert and oriented to person, place, and time.   Psychiatric:         Mood and Affect: Mood normal.         Behavior: Behavior normal.       CMP:  Lab Results   Component Value Date    Glucose 100 01/24/2025    BUN 16 01/24/2025    BUN/Creatinine Ratio 11.9 01/24/2025    Creatinine 1.34 (H) 01/24/2025    Creatinine 0.80 09/02/2021    Creatinine, Urine 97.2 11/05/2024    CO2 28.0 01/24/2025    Calcium 8.8 01/24/2025    Albumin 2.4 (L) 01/24/2025    AST (SGOT) 28 01/24/2025    ALT (SGPT) 14 01/24/2025     Lipid Panel:  Lab Results   Component Value Date    CHOL 133 01/18/2025    TRIG 129 01/18/2025    HDL 34 (L) 01/18/2025    VLDL 23 01/18/2025  "   LDL 76 01/18/2025     HbA1c:  Lab Results   Component Value Date    HGBA1C 6.9 (A) 04/23/2025     9.8 (10/4/24)    Glucose:  Lab Results   Component Value Date    POCGLU 100 01/24/2025     Microalbumin:  Lab Results   Component Value Date    MARIA EO  11/05/2024      Comment:      Unable to calculate     TSH:  No results found for: \"TSH\"    Assessment and Plan    Diagnoses and all orders for this visit:    1. Type 2 diabetes mellitus with hyperglycemia, with long-term current use of insulin (Primary)  Assessment & Plan:  -Diabetes is improving with A1c down to 6.9.  -Discussed dietary and exercise guidelines with patient.  -Discussed the importance of yearly eye exams. Continue following with Retina specialist.  -Discussed the importance of checking BG's regularly.   Continue using CGM.  2 week data download is as follows:  Very High: 2%  High:28%  In Range:70%  Low: 0%  Very Low:0%  Trend shows improving BG's with post meal hypers.  -Continue Farxiga 10 mg QD.   -Continue Humalog 75/25 35 units BID.  -Continue Metformin 500 mg BID.  -S/S hypoglycemia reviewed with Rule of 15's advised.  -Follow-up in 3 months.    Orders:  -     POC Glycosylated Hemoglobin (Hb A1C)  -     Continuous Glucose Sensor (Dexcom G7 Sensor) misc; Use 1 each Every 10 (Ten) Days.  Dispense: 3 each; Refill: 5  -     TSH  -     T4, free  -     Comprehensive Metabolic Panel  -     Lipid Panel  -     Microalbumin / Creatinine Urine Ratio - Urine, Clean Catch    Other orders  -     metFORMIN (GLUCOPHAGE) 500 MG tablet; Take 1 tablet by mouth 2 (Two) Times a Day.  Dispense: 180 tablet; Refill: 1             Return in about 3 months (around 7/23/2025) for Follow-up appointment, A1C. The patient was instructed to contact the clinic with any interval questions or concerns.        This document has been electronically signed by KETTY Velazquez  April 23, 2025 14:38 EDT   Endocrinology    Please note that portions of this document were " completed with a voice recognition program. Efforts were made to edit the dictations, but occasionally words are mis-transcribed.

## 2025-04-23 NOTE — ASSESSMENT & PLAN NOTE
-Diabetes is improving with A1c down to 6.9.  -Discussed dietary and exercise guidelines with patient.  -Discussed the importance of yearly eye exams. Continue following with Retina specialist.  -Discussed the importance of checking BG's regularly.   Continue using CGM.  2 week data download is as follows:  Very High: 2%  High:28%  In Range:70%  Low: 0%  Very Low:0%  Trend shows improving BG's with post meal hypers.  -Continue Farxiga 10 mg QD.   -Continue Humalog 75/25 35 units BID.  -Continue Metformin 500 mg BID.  -S/S hypoglycemia reviewed with Rule of 15's advised.  -Follow-up in 3 months.

## 2025-04-24 LAB
CHOLEST SERPL-MCNC: 149 MG/DL (ref 0–200)
HDLC SERPL-MCNC: 35 MG/DL (ref 40–60)
LDLC SERPL CALC-MCNC: 87 MG/DL (ref 0–100)
LDLC/HDLC SERPL: 2.37 {RATIO}
T4 FREE SERPL-MCNC: 2.06 NG/DL (ref 0.92–1.68)
TRIGL SERPL-MCNC: 156 MG/DL (ref 0–150)
TSH SERPL DL<=0.05 MIU/L-ACNC: 0.05 UIU/ML (ref 0.27–4.2)
VLDLC SERPL-MCNC: 27 MG/DL (ref 5–40)

## (undated) DEVICE — MODEL AT P54, P/N 700608-035KIT CONTENTS: HAND CONTROLLER, 3-WAY HIGH-PRESSURE STOPCOCK WITH ROTATING END AND PREMIUM HIGH-PRESSURE TUBING: Brand: ANGIOTOUCH® KIT

## (undated) DEVICE — CATH DIAG EXPO .045 AR1 5F 100CM

## (undated) DEVICE — PK CATH CARD 10

## (undated) DEVICE — CATH DIAG EXPO .045 FL3  5F 100CM

## (undated) DEVICE — GW INQWIRE FC PTFE STD J/1.5 .035 260

## (undated) DEVICE — DEV COMP RAD PRELUDESYNC 24CM

## (undated) DEVICE — MODEL AT P65, P/N 701554-001KIT CONTENTS: HAND CONTROLLER, 3-WAY HIGH-PRESSURE STOPCOCK WITH ROTATING END AND PREMIUM HIGH-PRESSURE TUBING: Brand: ANGIOTOUCH® KIT

## (undated) DEVICE — GLIDESHEATH SLENDER STAINLESS STEEL KIT: Brand: GLIDESHEATH SLENDER

## (undated) DEVICE — CANNULA,ADULT,SOFT-TOUCH,7'TUBE,UC: Brand: PENDING

## (undated) DEVICE — RADIFOCUS GLIDEWIRE: Brand: GLIDEWIRE

## (undated) DEVICE — MODEL BT2000 P/N 700287-012KIT CONTENTS: MANIFOLD WITH SALINE AND CONTRAST PORTS, SALINE TUBING WITH SPIKE AND HAND SYRINGE, TRANSDUCER: Brand: BT2000 AUTOMATED MANIFOLD KIT

## (undated) DEVICE — CATH DIAG EXPO .045 AL1 5F 100CM

## (undated) DEVICE — INTRO SHEATH PRELUDE IDEAL SPRNG COIL 021 6F 23X80CM

## (undated) DEVICE — ANGIOGRAPHIC CATHETER: Brand: EXPO™

## (undated) DEVICE — CATH DIAG EXPO M/ PK 5F FL4/FR4 PIG

## (undated) DEVICE — A2000 MULTI-USE SYRINGE KIT, P/N 701277-003KIT CONTENTS: 100ML CONTRAST RESERVOIR AND TUBING WITH CONTRAST SPIKE AND CLAMP: Brand: A2000 MULTI-USE SYRINGE KIT